# Patient Record
Sex: MALE | Race: WHITE | Employment: FULL TIME | URBAN - METROPOLITAN AREA
[De-identification: names, ages, dates, MRNs, and addresses within clinical notes are randomized per-mention and may not be internally consistent; named-entity substitution may affect disease eponyms.]

---

## 2017-01-11 ENCOUNTER — OFFICE VISIT (OUTPATIENT)
Dept: INTERNAL MEDICINE CLINIC | Facility: CLINIC | Age: 57
End: 2017-01-11

## 2017-01-11 VITALS
HEART RATE: 80 BPM | DIASTOLIC BLOOD PRESSURE: 70 MMHG | RESPIRATION RATE: 16 BRPM | WEIGHT: 207 LBS | BODY MASS INDEX: 35.34 KG/M2 | SYSTOLIC BLOOD PRESSURE: 120 MMHG | HEIGHT: 64 IN | OXYGEN SATURATION: 99 %

## 2017-01-11 DIAGNOSIS — E53.8 B12 DEFICIENCY: ICD-10-CM

## 2017-01-11 DIAGNOSIS — I10 ESSENTIAL HYPERTENSION: ICD-10-CM

## 2017-01-11 DIAGNOSIS — E78.5 HYPERLIPIDEMIA, UNSPECIFIED HYPERLIPIDEMIA TYPE: ICD-10-CM

## 2017-01-11 DIAGNOSIS — E11.39 TYPE II OR UNSPECIFIED TYPE DIABETES MELLITUS WITH OPHTHALMIC MANIFESTATIONS, NOT STATED AS UNCONTROLLED(250.50): Primary | ICD-10-CM

## 2017-01-11 PROCEDURE — 96372 THER/PROPH/DIAG INJ SC/IM: CPT | Performed by: INTERNAL MEDICINE

## 2017-01-11 PROCEDURE — 99214 OFFICE O/P EST MOD 30 MIN: CPT | Performed by: INTERNAL MEDICINE

## 2017-01-11 RX ORDER — SIMVASTATIN 40 MG
TABLET ORAL
Qty: 90 TABLET | Refills: 1 | Status: SHIPPED | OUTPATIENT
Start: 2017-01-11 | End: 2017-07-07

## 2017-01-11 RX ORDER — LISINOPRIL 40 MG/1
40 TABLET ORAL
Qty: 90 TABLET | Refills: 1 | Status: SHIPPED | OUTPATIENT
Start: 2017-01-11 | End: 2017-07-07

## 2017-01-11 RX ORDER — AMLODIPINE BESYLATE 10 MG/1
TABLET ORAL
Qty: 90 TABLET | Refills: 1 | Status: SHIPPED | OUTPATIENT
Start: 2017-01-11 | End: 2017-07-07

## 2017-01-11 RX ADMIN — CYANOCOBALAMIN 1000 MCG: 1000 INJECTION INTRAMUSCULAR; SUBCUTANEOUS at 11:00:00

## 2017-01-11 NOTE — PROGRESS NOTES
HPI:   Molly Davis is a 61year old male who presents for recheck of his diabetes. Patient’s FBS have been 123, 115 , 88 , 142  Postprandial sugars are  Last visit with ophthalmologist was UTD. Pt has been checking his feet on a regular basis.  Pt carlos Oral Tab Take 1 tablet (50 mg total) by mouth nightly. Disp: 90 tablet Rfl: 3   Diclofenac Sodium 75 MG Oral Tab EC Take 1 tablet (75 mg total) by mouth daily.  Disp: 90 tablet Rfl: 3   MetFORMIN HCl (GLUCOPHAGE) 850 MG Oral Tab TAKE 1 TABLET BY MOUTH TWICE History:   Smoking Status: Never Smoker                      Smokeless Status: Never Used                        Alcohol Use: Yes           0.0 oz/week       0 Standard drinks or equivalent per week       Comment: 1 drink every few months     Family Histor ophthalmic manifestations, not stated as uncontrolled (hcc)  (primary encounter diagnosis)- controlled  Essential hypertension- controlled  Hyperlipidemia, unspecified hyperlipidemia type- at goal  B12 deficiency- b12 shot today      Orders Placed This Enc

## 2017-01-18 RX ORDER — CYANOCOBALAMIN 1000 UG/ML
1000 INJECTION INTRAMUSCULAR; SUBCUTANEOUS ONCE
Status: COMPLETED | OUTPATIENT
Start: 2017-01-11 | End: 2017-01-11

## 2017-01-26 ENCOUNTER — OFFICE VISIT (OUTPATIENT)
Dept: NEUROLOGY | Facility: CLINIC | Age: 57
End: 2017-01-26

## 2017-01-26 ENCOUNTER — TELEPHONE (OUTPATIENT)
Dept: NEUROLOGY | Facility: CLINIC | Age: 57
End: 2017-01-26

## 2017-01-26 VITALS
WEIGHT: 208 LBS | BODY MASS INDEX: 36 KG/M2 | DIASTOLIC BLOOD PRESSURE: 84 MMHG | HEART RATE: 60 BPM | SYSTOLIC BLOOD PRESSURE: 118 MMHG | RESPIRATION RATE: 16 BRPM

## 2017-01-26 DIAGNOSIS — M25.562 ACUTE PAIN OF LEFT KNEE: ICD-10-CM

## 2017-01-26 DIAGNOSIS — M31.5 POLYMYALGIA ARTERITICA (HCC): ICD-10-CM

## 2017-01-26 DIAGNOSIS — G81.10 SPASTIC HEMIPLEGIA AFFECTING NONDOMINANT SIDE (HCC): ICD-10-CM

## 2017-01-26 DIAGNOSIS — R26.9 GAIT ABNORMALITY: ICD-10-CM

## 2017-01-26 DIAGNOSIS — I69.354 HEMIPARESIS AFFECTING LEFT SIDE AS LATE EFFECT OF STROKE (HCC): ICD-10-CM

## 2017-01-26 DIAGNOSIS — G44.209 TENSION HEADACHE: Primary | ICD-10-CM

## 2017-01-26 DIAGNOSIS — E66.9 OBESITY, UNSPECIFIED: ICD-10-CM

## 2017-01-26 DIAGNOSIS — G44.1 OTHER VASCULAR HEADACHE: ICD-10-CM

## 2017-01-26 DIAGNOSIS — M62.81 MUSCLE WEAKNESS (GENERALIZED): ICD-10-CM

## 2017-01-26 PROBLEM — M25.569 KNEE PAIN: Status: ACTIVE | Noted: 2017-01-26

## 2017-01-26 PROCEDURE — 99214 OFFICE O/P EST MOD 30 MIN: CPT | Performed by: OTHER

## 2017-01-26 RX ORDER — AMITRIPTYLINE HYDROCHLORIDE 50 MG/1
50 TABLET, FILM COATED ORAL NIGHTLY
Qty: 90 TABLET | Refills: 3 | Status: SHIPPED | OUTPATIENT
Start: 2017-01-26 | End: 2018-01-26

## 2017-01-26 RX ORDER — TOPIRAMATE 100 MG/1
400 TABLET, FILM COATED ORAL NIGHTLY
Qty: 360 TABLET | Refills: 3 | Status: SHIPPED | OUTPATIENT
Start: 2017-01-26 | End: 2018-06-26

## 2017-01-26 RX ORDER — CYANOCOBALAMIN 1000 UG/ML
1000 INJECTION INTRAMUSCULAR; SUBCUTANEOUS
COMMUNITY
End: 2017-03-14

## 2017-01-26 RX ORDER — DICLOFENAC SODIUM 75 MG/1
75 TABLET, DELAYED RELEASE ORAL DAILY
Qty: 90 TABLET | Refills: 3 | Status: SHIPPED | OUTPATIENT
Start: 2017-01-26 | End: 2018-01-26

## 2017-01-26 NOTE — PROGRESS NOTES
Pt here for follow up for stroke 7 years ago. Pt reports confusion and difficulty remembering things. Pt here to discuss next steps.

## 2017-01-26 NOTE — PATIENT INSTRUCTIONS
Refill policies:    • Allow 2 business days for refills; controlled substances may take longer.   • Contact your pharmacy at least 5 days prior to running out of medication and have them send an electronic request or submit request through the “request re your physician has recommended that you have a procedure or additional testing performed. [facility]) will contact your insurance carrier to obtain pre-certification or prior authorization.     Unfortunately, KAREN has seen an increas

## 2017-01-26 NOTE — PROGRESS NOTES
[facility] in [place] with [facility]  Neurology - Clinic Follow up  2017    Shyla Edmonds Patient Status:  No patient class for patient encounter    11/15/1960 MRN [id number]   [facility] Hyperlipidemia    • Tension headache    • Obesity, unspecified    • Cataract    • Capsulitis    • Radiculitis, cervical    • Arthropathy      Thoracic   • Myofacial muscle pain    • Sexual dysfunction    • Thalamic infarction [facility])      bilateral thalamic denies any fever or chills. But weight loss 8 pounds in a week,   L. Upper eyelid infection,   Respiratory: Denies: Difficulty Breathing, Chronic Cough and Wheezing. Cardiovascular: NO Chest Pain and Palpitations.   Neurological:  See history; relevant ite continue PT,    (434.11) Cerebral embolism with cerebral infarction  Old L.  Side weakness, same,        PMR, check ESR, he is better  Keep diclofenac daily,     (781.2) Gait abnormality  (729.5) Pain in limb, same, on cane  He has more L. Knee pain, instru

## 2017-01-26 NOTE — TELEPHONE ENCOUNTER
Form signed by Dr. Gold Cavazos and faxed to [facility] at above number with OV from 1/26/17 visit. Fax confirmation received. Original form sent to scanning.

## 2017-01-26 NOTE — TELEPHONE ENCOUNTER
While at [address], pt requested Dr. Leeroy Roblero complete the Attending physician progress notes for disability. Paperwork initiated. OV notes printed. Placed in Dr. Kali Campbell office for signature.

## 2017-02-10 ENCOUNTER — NURSE ONLY (OUTPATIENT)
Dept: INTERNAL MEDICINE CLINIC | Facility: CLINIC | Age: 57
End: 2017-02-10

## 2017-02-10 DIAGNOSIS — E53.8 VITAMIN B12 DEFICIENCY: Primary | ICD-10-CM

## 2017-02-10 PROCEDURE — 96372 THER/PROPH/DIAG INJ SC/IM: CPT | Performed by: PHYSICIAN ASSISTANT

## 2017-02-10 RX ORDER — CYANOCOBALAMIN 1000 UG/ML
1000 INJECTION INTRAMUSCULAR; SUBCUTANEOUS ONCE
Status: COMPLETED | OUTPATIENT
Start: 2017-02-10 | End: 2017-02-10

## 2017-02-10 RX ADMIN — CYANOCOBALAMIN 1000 MCG: 1000 INJECTION INTRAMUSCULAR; SUBCUTANEOUS at 13:26:00

## 2017-03-13 ENCOUNTER — NURSE ONLY (OUTPATIENT)
Dept: INTERNAL MEDICINE CLINIC | Facility: CLINIC | Age: 57
End: 2017-03-13

## 2017-03-13 ENCOUNTER — TELEPHONE (OUTPATIENT)
Dept: INTERNAL MEDICINE CLINIC | Facility: CLINIC | Age: 57
End: 2017-03-13

## 2017-03-13 DIAGNOSIS — E53.8 B12 DEFICIENCY: Primary | ICD-10-CM

## 2017-03-13 PROCEDURE — 96372 THER/PROPH/DIAG INJ SC/IM: CPT | Performed by: INTERNAL MEDICINE

## 2017-03-13 RX ADMIN — CYANOCOBALAMIN 1000 MCG: 1000 INJECTION INTRAMUSCULAR; SUBCUTANEOUS at 10:39:00

## 2017-03-14 RX ORDER — CYANOCOBALAMIN 1000 UG/ML
1000 INJECTION INTRAMUSCULAR; SUBCUTANEOUS
Status: COMPLETED | OUTPATIENT
Start: 2017-03-14 | End: 2018-02-16

## 2017-04-11 ENCOUNTER — NURSE ONLY (OUTPATIENT)
Dept: INTERNAL MEDICINE CLINIC | Facility: CLINIC | Age: 57
End: 2017-04-11

## 2017-04-11 PROCEDURE — 96372 THER/PROPH/DIAG INJ SC/IM: CPT | Performed by: INTERNAL MEDICINE

## 2017-04-11 RX ADMIN — CYANOCOBALAMIN 1000 MCG: 1000 INJECTION INTRAMUSCULAR; SUBCUTANEOUS at 11:08:00

## 2017-04-18 ENCOUNTER — TELEPHONE (OUTPATIENT)
Dept: INTERNAL MEDICINE CLINIC | Facility: CLINIC | Age: 57
End: 2017-04-18

## 2017-04-18 NOTE — TELEPHONE ENCOUNTER
Pt walked in with clearance form for dental procedure on 4/26/17. Form fee assessed. Form to Dr Malik Elias for completion and signature. Pt requesting form to be faxed to dental office and himself. He will call back with his fax #.

## 2017-05-11 ENCOUNTER — NURSE ONLY (OUTPATIENT)
Dept: INTERNAL MEDICINE CLINIC | Facility: CLINIC | Age: 57
End: 2017-05-11

## 2017-05-11 PROCEDURE — 96372 THER/PROPH/DIAG INJ SC/IM: CPT | Performed by: INTERNAL MEDICINE

## 2017-05-11 RX ADMIN — CYANOCOBALAMIN 1000 MCG: 1000 INJECTION INTRAMUSCULAR; SUBCUTANEOUS at 10:53:00

## 2017-05-26 RX ORDER — SIMVASTATIN 40 MG
TABLET ORAL
Qty: 90 TABLET | OUTPATIENT
Start: 2017-05-26

## 2017-06-12 ENCOUNTER — NURSE ONLY (OUTPATIENT)
Dept: INTERNAL MEDICINE CLINIC | Facility: CLINIC | Age: 57
End: 2017-06-12

## 2017-06-12 PROCEDURE — 96372 THER/PROPH/DIAG INJ SC/IM: CPT | Performed by: INTERNAL MEDICINE

## 2017-06-12 RX ADMIN — CYANOCOBALAMIN 1000 MCG: 1000 INJECTION INTRAMUSCULAR; SUBCUTANEOUS at 12:05:00

## 2017-07-07 RX ORDER — LISINOPRIL 40 MG/1
TABLET ORAL
Qty: 90 TABLET | Refills: 0 | Status: SHIPPED | OUTPATIENT
Start: 2017-07-07 | End: 2017-10-17

## 2017-07-07 RX ORDER — BLOOD SUGAR DIAGNOSTIC
STRIP MISCELLANEOUS
Qty: 100 STRIP | Refills: 0 | Status: SHIPPED | OUTPATIENT
Start: 2017-07-07 | End: 2017-10-17

## 2017-07-07 RX ORDER — AMLODIPINE BESYLATE 10 MG/1
TABLET ORAL
Qty: 90 TABLET | Refills: 0 | Status: SHIPPED | OUTPATIENT
Start: 2017-07-07 | End: 2017-10-17

## 2017-07-09 RX ORDER — SIMVASTATIN 40 MG
TABLET ORAL
Qty: 90 TABLET | Refills: 0 | Status: SHIPPED | OUTPATIENT
Start: 2017-07-09 | End: 2017-09-01

## 2017-07-10 NOTE — ADDENDUM NOTE
Encounter addended by: Zaida Charles LPN on: 6/89/3447 19:87 AM<BR>    Actions taken: Letter status changed

## 2017-07-12 LAB
ALBUMIN/GLOBULIN RATIO: 1.3 (CALC) (ref 1–2.5)
ALBUMIN: 4.1 G/DL (ref 3.6–5.1)
ALKALINE PHOSPHATASE: 64 U/L (ref 40–115)
ALT: 4 U/L (ref 9–46)
AST: 8 U/L (ref 10–35)
BILIRUBIN, TOTAL: 0.6 MG/DL (ref 0.2–1.2)
BUN: 14 MG/DL (ref 7–25)
CALCIUM: 9 MG/DL (ref 8.6–10.3)
CARBON DIOXIDE: 21 MMOL/L (ref 20–31)
CHLORIDE: 111 MMOL/L (ref 98–110)
CHOL/HDLC RATIO: 2.6 (CALC)
CHOLESTEROL, TOTAL: 140 MG/DL (ref 125–200)
CREATININE, RANDOM URINE: 238 MG/DL (ref 20–370)
CREATININE: 0.95 MG/DL (ref 0.7–1.33)
EGFR IF AFRICN AM: 103 ML/MIN/1.73M2
EGFR IF NONAFRICN AM: 88 ML/MIN/1.73M2
GLOBULIN: 3.1 G/DL (CALC) (ref 1.9–3.7)
GLUCOSE: 81 MG/DL (ref 65–99)
HDL CHOLESTEROL: 53 MG/DL
HEMOGLOBIN A1C: 5.4 % OF TOTAL HGB
LDL-CHOLESTEROL: 74 MG/DL (CALC)
MICROALBUMIN/CREATININE RATIO, RANDOM URINE: 11 MCG/MG CREAT
MICROALBUMIN: 2.5 MG/DL
NON-HDL CHOLESTEROL: 87 MG/DL (CALC)
POTASSIUM: 4.1 MMOL/L (ref 3.5–5.3)
PROTEIN, TOTAL: 7.2 G/DL (ref 6.1–8.1)
SODIUM: 141 MMOL/L (ref 135–146)
TRIGLYCERIDES: 65 MG/DL

## 2017-07-13 ENCOUNTER — OFFICE VISIT (OUTPATIENT)
Dept: INTERNAL MEDICINE CLINIC | Facility: CLINIC | Age: 57
End: 2017-07-13

## 2017-07-13 VITALS
BODY MASS INDEX: 33.12 KG/M2 | WEIGHT: 194 LBS | OXYGEN SATURATION: 99 % | DIASTOLIC BLOOD PRESSURE: 60 MMHG | SYSTOLIC BLOOD PRESSURE: 100 MMHG | HEART RATE: 66 BPM | HEIGHT: 64 IN | RESPIRATION RATE: 12 BRPM

## 2017-07-13 DIAGNOSIS — I69.359 HEMIPARESIS DUE TO OLD CEREBROVASCULAR ACCIDENT (HCC): ICD-10-CM

## 2017-07-13 DIAGNOSIS — I10 ESSENTIAL HYPERTENSION: ICD-10-CM

## 2017-07-13 DIAGNOSIS — E11.39 TYPE II OR UNSPECIFIED TYPE DIABETES MELLITUS WITH OPHTHALMIC MANIFESTATIONS, NOT STATED AS UNCONTROLLED(250.50): Primary | ICD-10-CM

## 2017-07-13 DIAGNOSIS — Z86.73 HX OF COMPLETED STROKE: ICD-10-CM

## 2017-07-13 DIAGNOSIS — E53.8 B12 DEFICIENCY: ICD-10-CM

## 2017-07-13 PROCEDURE — 99214 OFFICE O/P EST MOD 30 MIN: CPT | Performed by: INTERNAL MEDICINE

## 2017-07-13 PROCEDURE — 96372 THER/PROPH/DIAG INJ SC/IM: CPT | Performed by: INTERNAL MEDICINE

## 2017-07-13 RX ORDER — CHLORHEXIDINE GLUCONATE 0.12 MG/ML
RINSE ORAL
Refills: 2 | COMMUNITY
Start: 2017-05-13 | End: 2017-07-17

## 2017-07-13 RX ADMIN — CYANOCOBALAMIN 1000 MCG: 1000 INJECTION INTRAMUSCULAR; SUBCUTANEOUS at 09:23:00

## 2017-07-13 NOTE — PROGRESS NOTES
HPI:   Guy Ferrera is a 61year old male who presents for recheck of his diabetes.  Patient’s FBS have been  108 , 131, 146, 129, 111, 118, 112 , 151  Pt does not sleep well Pt has new glucometer  Postprandial sugars are not checked  Last visit with oph total) by mouth nightly. Disp: 360 tablet Rfl: 3   Amitriptyline HCl 50 MG Oral Tab Take 1 tablet (50 mg total) by mouth nightly. Disp: 90 tablet Rfl: 3   Diclofenac Sodium 75 MG Oral Tab EC Take 1 tablet (75 mg total) by mouth daily.  Disp: 90 tablet Rfl: Never Used                      Alcohol use: Yes           0.0 oz/week     Comment: 1 drink every few months     Family History   Problem Relation Age of Onset   • Colon ca [Other] [OTHER]       fam hx   • Arthritis       fam hx   • MI [Other] [OTHER] Fath CHOL/HDLC RATIO 2.6 < OR = 5.0 (calc)   NON-HDL CHOLESTEROL 87 mg/dL (calc)   -COMP METABOLIC PANEL (14)   Result Value Ref Range   GLUCOSE 81 65 - 99 mg/dL   UREA NITROGEN (BUN) 14 7 - 25 mg/dL   CREATININE 0.95 0.70 - 1.33 mg/dL   eGFR NON-AFR.  KIET

## 2017-08-11 ENCOUNTER — NURSE ONLY (OUTPATIENT)
Dept: INTERNAL MEDICINE CLINIC | Facility: CLINIC | Age: 57
End: 2017-08-11

## 2017-08-11 PROCEDURE — 96372 THER/PROPH/DIAG INJ SC/IM: CPT | Performed by: INTERNAL MEDICINE

## 2017-08-11 RX ADMIN — CYANOCOBALAMIN 1000 MCG: 1000 INJECTION INTRAMUSCULAR; SUBCUTANEOUS at 11:30:00

## 2017-08-24 ENCOUNTER — TELEPHONE (OUTPATIENT)
Dept: INTERNAL MEDICINE CLINIC | Facility: CLINIC | Age: 57
End: 2017-08-24

## 2017-08-25 RX ORDER — VARDENAFIL HCL 20 MG
TABLET ORAL
Qty: 10 TABLET | Refills: 3 | Status: SHIPPED | OUTPATIENT
Start: 2017-08-25 | End: 2017-08-30

## 2017-08-25 NOTE — TELEPHONE ENCOUNTER
Last OV pertinent to medication: 7/13/2017  Last refill date: 3/7/2016      #/refills: 10/5  When pt was asked to return for OV: 6 months - DM check  Upcoming appt/reason: None , only B12 injections

## 2017-08-29 NOTE — TELEPHONE ENCOUNTER
Levitra denied by insurance. Do you want prior auth or to try different medication? This is not a new medication for pt.

## 2017-08-30 ENCOUNTER — TELEPHONE (OUTPATIENT)
Dept: INTERNAL MEDICINE CLINIC | Facility: CLINIC | Age: 57
End: 2017-08-30

## 2017-08-30 RX ORDER — SILDENAFIL 100 MG/1
50 TABLET, FILM COATED ORAL AS NEEDED
Qty: 8 TABLET | Refills: 1 | Status: SHIPPED | OUTPATIENT
Start: 2017-08-30 | End: 2018-08-22

## 2017-08-30 RX ORDER — SILDENAFIL 50 MG/1
50 TABLET, FILM COATED ORAL
Qty: 10 TABLET | Refills: 1 | Status: SHIPPED | OUTPATIENT
Start: 2017-08-30 | End: 2017-08-30

## 2017-08-30 NOTE — TELEPHONE ENCOUNTER
Please call pt, he has question re a medication, would not tell me which one, states that he spoke with one of our nurses about this in the past. Thank you

## 2017-08-30 NOTE — TELEPHONE ENCOUNTER
Received fax  From [employer] re: new order for Viagra. Noted below order. Placed order for Viagra  50 mg to [facility].

## 2017-08-30 NOTE — TELEPHONE ENCOUNTER
Spoke with pt. Pt states that when he spoke with insurance company, he was told that they would only cover Viagra 100 mg strength, not any other strength (25 mg or 50 mg). Pt states that he would take 1/2 pill of the Viagra 100mg if ordered.   Pt states th

## 2017-08-30 NOTE — TELEPHONE ENCOUNTER
Incoming (mail or fax): Fax  Received from:  [facility]- new prescription request for Viagra   Documentation given to:  [id number] [employer] fax bin.

## 2017-09-01 RX ORDER — SIMVASTATIN 40 MG
TABLET ORAL
Qty: 90 TABLET | Refills: 1 | Status: SHIPPED | OUTPATIENT
Start: 2017-09-01 | End: 2018-03-13

## 2017-09-05 ENCOUNTER — TELEPHONE (OUTPATIENT)
Dept: INTERNAL MEDICINE CLINIC | Facility: CLINIC | Age: 57
End: 2017-09-05

## 2017-09-05 NOTE — TELEPHONE ENCOUNTER
Incoming (mail or fax): Fax  Received from:  [employer]  Documentation given to:  [id number] [employer] fax bin.

## 2017-09-06 NOTE — TELEPHONE ENCOUNTER
Received fax from [address] [place] [address] Dr davila: concerns about concurrent use of prescribed meds. Per Dr. Rollins Manifold advised no concerns at this time. Discarded.

## 2017-09-12 ENCOUNTER — NURSE ONLY (OUTPATIENT)
Dept: INTERNAL MEDICINE CLINIC | Facility: CLINIC | Age: 57
End: 2017-09-12

## 2017-09-12 PROCEDURE — 96372 THER/PROPH/DIAG INJ SC/IM: CPT | Performed by: INTERNAL MEDICINE

## 2017-09-12 RX ADMIN — CYANOCOBALAMIN 1000 MCG: 1000 INJECTION INTRAMUSCULAR; SUBCUTANEOUS at 09:59:00

## 2017-10-13 ENCOUNTER — TELEPHONE (OUTPATIENT)
Dept: INTERNAL MEDICINE CLINIC | Facility: CLINIC | Age: 57
End: 2017-10-13

## 2017-10-13 ENCOUNTER — NURSE ONLY (OUTPATIENT)
Dept: INTERNAL MEDICINE CLINIC | Facility: CLINIC | Age: 57
End: 2017-10-13

## 2017-10-13 PROCEDURE — 96372 THER/PROPH/DIAG INJ SC/IM: CPT | Performed by: INTERNAL MEDICINE

## 2017-10-13 RX ADMIN — CYANOCOBALAMIN 1000 MCG: 1000 INJECTION INTRAMUSCULAR; SUBCUTANEOUS at 11:50:00

## 2017-10-13 NOTE — TELEPHONE ENCOUNTER
Pt came in for B12 injection and wanted to inform Dr Irene Richter per dentist's request that he has had 2 dental infections in past 6 months so has had to take antibiotics for these.  Was on abx recently but did not remember name, has bottle at home if medication na

## 2017-10-17 RX ORDER — BLOOD SUGAR DIAGNOSTIC
STRIP MISCELLANEOUS
Qty: 100 STRIP | Refills: 0 | Status: SHIPPED | OUTPATIENT
Start: 2017-10-17 | End: 2018-01-16

## 2017-10-17 RX ORDER — LISINOPRIL 40 MG/1
40 TABLET ORAL
Qty: 90 TABLET | Refills: 0 | Status: SHIPPED | OUTPATIENT
Start: 2017-10-17 | End: 2018-01-16

## 2017-10-17 RX ORDER — AMLODIPINE BESYLATE 10 MG/1
10 TABLET ORAL
Qty: 90 TABLET | Refills: 0 | Status: SHIPPED | OUTPATIENT
Start: 2017-10-17 | End: 2018-01-16

## 2017-11-14 ENCOUNTER — NURSE ONLY (OUTPATIENT)
Dept: INTERNAL MEDICINE CLINIC | Facility: CLINIC | Age: 57
End: 2017-11-14

## 2017-11-14 PROCEDURE — 96372 THER/PROPH/DIAG INJ SC/IM: CPT | Performed by: INTERNAL MEDICINE

## 2017-11-14 RX ADMIN — CYANOCOBALAMIN 1000 MCG: 1000 INJECTION INTRAMUSCULAR; SUBCUTANEOUS at 09:58:00

## 2017-12-11 NOTE — TELEPHONE ENCOUNTER
Script passed protocol. Patient is asking for 3 months worth since its going to [employer].    7/13/17 Last seen for Med check   Last refill date: 7/9/17     #/refills: 180 tabs + 0 refills  When pt was asked to return for OV: follow up in 6 months   [employer]

## 2017-12-14 ENCOUNTER — NURSE ONLY (OUTPATIENT)
Dept: INTERNAL MEDICINE CLINIC | Facility: CLINIC | Age: 57
End: 2017-12-14

## 2017-12-14 PROCEDURE — 96372 THER/PROPH/DIAG INJ SC/IM: CPT | Performed by: INTERNAL MEDICINE

## 2017-12-14 RX ADMIN — CYANOCOBALAMIN 1000 MCG: 1000 INJECTION INTRAMUSCULAR; SUBCUTANEOUS at 10:30:00

## 2017-12-26 RX ORDER — SIMVASTATIN 40 MG
TABLET ORAL
Qty: 90 TABLET | OUTPATIENT
Start: 2017-12-26

## 2018-01-06 LAB — HEMOGLOBIN A1C: 5.3 % OF TOTAL HGB

## 2018-01-16 ENCOUNTER — OFFICE VISIT (OUTPATIENT)
Dept: INTERNAL MEDICINE CLINIC | Facility: CLINIC | Age: 58
End: 2018-01-16

## 2018-01-16 VITALS
OXYGEN SATURATION: 98 % | WEIGHT: 177.5 LBS | BODY MASS INDEX: 31.85 KG/M2 | HEART RATE: 76 BPM | RESPIRATION RATE: 16 BRPM | TEMPERATURE: 98 F | HEIGHT: 62.5 IN | SYSTOLIC BLOOD PRESSURE: 122 MMHG | DIASTOLIC BLOOD PRESSURE: 70 MMHG

## 2018-01-16 DIAGNOSIS — D22.9 CHANGE IN MOLE: ICD-10-CM

## 2018-01-16 DIAGNOSIS — E11.39 TYPE II OR UNSPECIFIED TYPE DIABETES MELLITUS WITH OPHTHALMIC MANIFESTATIONS, NOT STATED AS UNCONTROLLED(250.50): Primary | ICD-10-CM

## 2018-01-16 DIAGNOSIS — I10 ESSENTIAL HYPERTENSION: ICD-10-CM

## 2018-01-16 DIAGNOSIS — Z23 NEED FOR VACCINATION: ICD-10-CM

## 2018-01-16 DIAGNOSIS — E53.8 B12 DEFICIENCY: ICD-10-CM

## 2018-01-16 PROCEDURE — 96372 THER/PROPH/DIAG INJ SC/IM: CPT | Performed by: INTERNAL MEDICINE

## 2018-01-16 PROCEDURE — 90686 IIV4 VACC NO PRSV 0.5 ML IM: CPT | Performed by: INTERNAL MEDICINE

## 2018-01-16 PROCEDURE — 90471 IMMUNIZATION ADMIN: CPT | Performed by: INTERNAL MEDICINE

## 2018-01-16 PROCEDURE — 99214 OFFICE O/P EST MOD 30 MIN: CPT | Performed by: INTERNAL MEDICINE

## 2018-01-16 RX ORDER — CYANOCOBALAMIN 1000 UG/ML
1000 INJECTION INTRAMUSCULAR; SUBCUTANEOUS ONCE
Status: CANCELLED | OUTPATIENT
Start: 2018-01-16 | End: 2018-01-16

## 2018-01-16 RX ORDER — AMLODIPINE BESYLATE 10 MG/1
10 TABLET ORAL
Qty: 90 TABLET | Refills: 1 | Status: SHIPPED | OUTPATIENT
Start: 2018-01-16 | End: 2018-08-22

## 2018-01-16 RX ORDER — LISINOPRIL 40 MG/1
40 TABLET ORAL
Qty: 90 TABLET | Refills: 1 | Status: SHIPPED | OUTPATIENT
Start: 2018-01-16 | End: 2018-08-22

## 2018-01-16 RX ORDER — BLOOD SUGAR DIAGNOSTIC
STRIP MISCELLANEOUS
Qty: 100 STRIP | Refills: 1 | Status: SHIPPED | OUTPATIENT
Start: 2018-01-16 | End: 2018-10-30

## 2018-01-16 RX ADMIN — CYANOCOBALAMIN 1000 MCG: 1000 INJECTION INTRAMUSCULAR; SUBCUTANEOUS at 16:57:00

## 2018-01-16 NOTE — PROGRESS NOTES
HPI:   Elliot Tiwari is a 61year old male who presents for recheck of his diabetes. Patient’s FBS have been running  110 , 136, 137, 126 , 95 fasting AM  No hypoglycemia reactions . Pt has been checking his feet on a regular basis.  Pt stable tingling mg total) by mouth as needed for Erectile Dysfunction. Disp: 8 tablet Rfl: 1   topiramate (TOPIRAGEN) 100 MG Oral Tab Take 4 tablets (400 mg total) by mouth nightly.  Disp: 360 tablet Rfl: 3   Amitriptyline HCl 50 MG Oral Tab Take 1 tablet (50 mg total) by Smokeless tobacco: Never Used                      Alcohol use: Yes           0.0 oz/week     Comment: 1 drink every few months     Family History   Problem Relation Age of Onset   • Colon ca [Other] [OTHER]       fam hx   • Arthritis       fam hx   • unspecified type diabetes mellitus with ophthalmic manifestations, not stated as uncontrolled(250.50) (hcc)  (primary encounter diagnosis)- need eye exam  Stay on metformin bid 850  Check labs in 6 months  Change in mole-refer to Dr Benji Leal

## 2018-02-16 ENCOUNTER — NURSE ONLY (OUTPATIENT)
Dept: INTERNAL MEDICINE CLINIC | Facility: CLINIC | Age: 58
End: 2018-02-16

## 2018-02-16 PROCEDURE — 96372 THER/PROPH/DIAG INJ SC/IM: CPT | Performed by: INTERNAL MEDICINE

## 2018-02-16 RX ADMIN — CYANOCOBALAMIN 1000 MCG: 1000 INJECTION INTRAMUSCULAR; SUBCUTANEOUS at 13:03:00

## 2018-02-16 NOTE — PROGRESS NOTES
Pt here for B12 shot. Pt name and  verified. Admin IM left deltoid, no complications, no complaints. Will need new order placed. Request routed to Dr Asif Brown for new order.

## 2018-03-14 RX ORDER — SIMVASTATIN 40 MG
TABLET ORAL
Qty: 90 TABLET | Refills: 0 | Status: SHIPPED | OUTPATIENT
Start: 2018-03-14 | End: 2018-05-17

## 2018-03-15 ENCOUNTER — NURSE ONLY (OUTPATIENT)
Dept: INTERNAL MEDICINE CLINIC | Facility: CLINIC | Age: 58
End: 2018-03-15

## 2018-03-15 PROCEDURE — 96372 THER/PROPH/DIAG INJ SC/IM: CPT | Performed by: INTERNAL MEDICINE

## 2018-03-15 RX ADMIN — CYANOCOBALAMIN 1000 MCG: 1000 INJECTION INTRAMUSCULAR; SUBCUTANEOUS at 10:23:00

## 2018-03-15 NOTE — PROGRESS NOTES
Patient here for his Vitamin B12 injection. Verified name,  and PCP name. Administered 1mL / 1000 mcg IM in L deltoid. Patient tolerated well.

## 2018-04-17 ENCOUNTER — NURSE ONLY (OUTPATIENT)
Dept: INTERNAL MEDICINE CLINIC | Facility: CLINIC | Age: 58
End: 2018-04-17

## 2018-04-17 PROCEDURE — 96372 THER/PROPH/DIAG INJ SC/IM: CPT | Performed by: INTERNAL MEDICINE

## 2018-04-17 RX ADMIN — CYANOCOBALAMIN 1000 MCG: 1000 INJECTION INTRAMUSCULAR; SUBCUTANEOUS at 11:57:00

## 2018-04-17 NOTE — PROGRESS NOTES
Patient came in for his monthly Vitamin b12 injection. Verified name, , PCP name, and procedure to be done. Administered 1000 mcg/1 mL IM in left deltoid. Patient tolerated well.

## 2018-05-18 ENCOUNTER — NURSE ONLY (OUTPATIENT)
Dept: INTERNAL MEDICINE CLINIC | Facility: CLINIC | Age: 58
End: 2018-05-18

## 2018-05-18 PROCEDURE — 96372 THER/PROPH/DIAG INJ SC/IM: CPT | Performed by: INTERNAL MEDICINE

## 2018-05-18 RX ORDER — SIMVASTATIN 40 MG
TABLET ORAL
Qty: 90 TABLET | Refills: 0 | Status: SHIPPED | OUTPATIENT
Start: 2018-05-18 | End: 2018-08-22

## 2018-05-18 RX ADMIN — CYANOCOBALAMIN 1000 MCG: 1000 INJECTION INTRAMUSCULAR; SUBCUTANEOUS at 11:02:00

## 2018-05-18 NOTE — PROGRESS NOTES
Patient came in for his monthly Vitamin B12 injection. Verified name, , PCP name, and procedure to be performed. Administered 1mL/1000 mcg Vitamin B12 in Left Deltoid. Patient tolerated well.

## 2018-06-18 ENCOUNTER — NURSE ONLY (OUTPATIENT)
Dept: INTERNAL MEDICINE CLINIC | Facility: CLINIC | Age: 58
End: 2018-06-18

## 2018-06-18 PROCEDURE — 96372 THER/PROPH/DIAG INJ SC/IM: CPT | Performed by: INTERNAL MEDICINE

## 2018-06-18 RX ADMIN — CYANOCOBALAMIN 1000 MCG: 1000 INJECTION INTRAMUSCULAR; SUBCUTANEOUS at 11:50:00

## 2018-06-18 NOTE — PROGRESS NOTES
Pt here for b12 monthly injection. Name and  verified. Admin left deltoid IM no complications/complaints. To return 1 month.

## 2018-06-26 ENCOUNTER — OFFICE VISIT (OUTPATIENT)
Dept: NEUROLOGY | Facility: CLINIC | Age: 58
End: 2018-06-26

## 2018-06-26 VITALS
WEIGHT: 201 LBS | BODY MASS INDEX: 35 KG/M2 | SYSTOLIC BLOOD PRESSURE: 120 MMHG | HEART RATE: 64 BPM | DIASTOLIC BLOOD PRESSURE: 70 MMHG

## 2018-06-26 DIAGNOSIS — I69.354 HEMIPARESIS AFFECTING LEFT SIDE AS LATE EFFECT OF STROKE (HCC): ICD-10-CM

## 2018-06-26 DIAGNOSIS — M62.81 MUSCLE WEAKNESS (GENERALIZED): ICD-10-CM

## 2018-06-26 DIAGNOSIS — G81.10 SPASTIC HEMIPLEGIA AFFECTING NONDOMINANT SIDE (HCC): ICD-10-CM

## 2018-06-26 DIAGNOSIS — R26.9 GAIT ABNORMALITY: Primary | ICD-10-CM

## 2018-06-26 DIAGNOSIS — G44.049 CHRONIC PAROXYSMAL HEMICRANIA, NOT INTRACTABLE: ICD-10-CM

## 2018-06-26 PROCEDURE — 99215 OFFICE O/P EST HI 40 MIN: CPT | Performed by: OTHER

## 2018-06-26 RX ORDER — TOPIRAMATE 100 MG/1
400 TABLET, FILM COATED ORAL NIGHTLY
Qty: 360 TABLET | Refills: 3 | Status: SHIPPED | OUTPATIENT
Start: 2018-06-26 | End: 2018-11-15

## 2018-06-26 NOTE — PATIENT INSTRUCTIONS
Refill policies:    • Allow 2-3 business days for refills; controlled substances may take longer.   • Contact your pharmacy at least 5 days prior to running out of medication and have them send an electronic request or submit request through the “request re entire amount billed. Precertification and Prior Authorizations: If your physician has recommended that you have a procedure or additional testing performed.   [facility]) will contact your insurance carrier to obtain pre-certi

## 2018-06-26 NOTE — PROGRESS NOTES
[facility] in [place] with [facility]  Neurology - Clinic Follow up  2018    Michelet Gonzalez Patient Status:  No patient class for patient encounter    11/15/1960 MRN [id number]   CARLIE pain/belching Since procedure, has lessened slightly.    • Frequent urination March 2010   • Headache March 2010   • Headache(784.0)    • Hyperlipidemia    • Hypertension    • Leg swelling March 2010   • Loss of appetite March 2010   • Muscle weakness (gene tablet Rfl: 1   Glucose Blood (ONETOUCH VERIO) In Vitro Strip Check blood sugar once a  day Disp: 100 strip Rfl: 1   Sildenafil Citrate (VIAGRA) 100 MG Oral Tab Take 0.5 tablets (50 mg total) by mouth as needed for Erectile Dysfunction.  Disp: 8 tablet Rfl: abnormality - Primary    HA (headache)            Impression/Plan:  (898.87) Muscle weakness (generalized)  (primary encounter diagnosis)  Same,     (434.11) Cerebral embolism with cerebral infarction  Old L.  Side weakness, same,        PMR, check ESR, he

## 2018-06-26 NOTE — PROGRESS NOTES
Patient here for stroke follow up. He states he still continues to have headaches, balance is bad and has frequent falls. Patient states he has trouble with short term memory.

## 2018-07-17 ENCOUNTER — NURSE ONLY (OUTPATIENT)
Dept: INTERNAL MEDICINE CLINIC | Facility: CLINIC | Age: 58
End: 2018-07-17
Payer: COMMERCIAL

## 2018-07-17 PROCEDURE — 96372 THER/PROPH/DIAG INJ SC/IM: CPT | Performed by: INTERNAL MEDICINE

## 2018-07-17 RX ADMIN — CYANOCOBALAMIN 1000 MCG: 1000 INJECTION INTRAMUSCULAR; SUBCUTANEOUS at 14:46:00

## 2018-07-17 NOTE — PROGRESS NOTES
Pt here for monthly B12 injection. Admin IM left deltoid, no complications. Pt will need next monthly dose here. Then will be transferring providers as his PCP is leaving. New PCP contacted advising of pts concern of needing to continue B12 injections.

## 2018-07-18 ENCOUNTER — TELEPHONE (OUTPATIENT)
Dept: INTERNAL MEDICINE CLINIC | Facility: CLINIC | Age: 58
End: 2018-07-18

## 2018-07-18 NOTE — TELEPHONE ENCOUNTER
Incoming (mail or fax): Handicap Placard   Received from:  Patient dropped off at office  Documentation given to: Triage     Patient did not pay for form aware of possible fee of $25. Please call patient when form is completed. Thank you!

## 2018-07-18 NOTE — TELEPHONE ENCOUNTER
Patient dropped off form for a Parking Placard. Do not see in chart that we've done one in the past for him. The patient is aware of the $25. charge. Form to be filled out and signed in folder. Routed to Dr. Tana Marcial.

## 2018-07-19 NOTE — TELEPHONE ENCOUNTER
Signed and filled out form at  for the patient to . Attempted to call the patient, but there was no answer other than an unidentified VM. Will have to try back. Copy of form in scanning bin.

## 2018-08-22 ENCOUNTER — OFFICE VISIT (OUTPATIENT)
Dept: FAMILY MEDICINE CLINIC | Facility: CLINIC | Age: 58
End: 2018-08-22
Payer: COMMERCIAL

## 2018-08-22 ENCOUNTER — TELEPHONE (OUTPATIENT)
Dept: FAMILY MEDICINE CLINIC | Facility: CLINIC | Age: 58
End: 2018-08-22

## 2018-08-22 VITALS
WEIGHT: 205 LBS | SYSTOLIC BLOOD PRESSURE: 100 MMHG | RESPIRATION RATE: 16 BRPM | BODY MASS INDEX: 35 KG/M2 | OXYGEN SATURATION: 97 % | DIASTOLIC BLOOD PRESSURE: 60 MMHG | HEART RATE: 68 BPM | HEIGHT: 64 IN

## 2018-08-22 DIAGNOSIS — E11.42 DM TYPE 2 WITH DIABETIC PERIPHERAL NEUROPATHY (HCC): Primary | ICD-10-CM

## 2018-08-22 DIAGNOSIS — I10 ESSENTIAL HYPERTENSION: ICD-10-CM

## 2018-08-22 DIAGNOSIS — I69.354 HEMIPARESIS AFFECTING LEFT SIDE AS LATE EFFECT OF STROKE (HCC): ICD-10-CM

## 2018-08-22 DIAGNOSIS — E53.8 B12 DEFICIENCY: ICD-10-CM

## 2018-08-22 DIAGNOSIS — Z12.5 SCREENING PSA (PROSTATE SPECIFIC ANTIGEN): ICD-10-CM

## 2018-08-22 DIAGNOSIS — E11.42 DM TYPE 2 WITH DIABETIC PERIPHERAL NEUROPATHY (HCC): ICD-10-CM

## 2018-08-22 PROCEDURE — 99204 OFFICE O/P NEW MOD 45 MIN: CPT | Performed by: FAMILY MEDICINE

## 2018-08-22 PROCEDURE — 96372 THER/PROPH/DIAG INJ SC/IM: CPT | Performed by: FAMILY MEDICINE

## 2018-08-22 RX ORDER — SILDENAFIL 100 MG/1
50 TABLET, FILM COATED ORAL AS NEEDED
Qty: 8 TABLET | Refills: 1 | Status: SHIPPED | OUTPATIENT
Start: 2018-08-22 | End: 2018-08-28

## 2018-08-22 RX ORDER — SIMVASTATIN 40 MG
TABLET ORAL
Qty: 90 TABLET | Refills: 1 | Status: CANCELLED | OUTPATIENT
Start: 2018-08-22

## 2018-08-22 RX ORDER — CHLORHEXIDINE GLUCONATE 0.12 MG/ML
RINSE ORAL AS NEEDED
COMMUNITY
Start: 2018-07-03

## 2018-08-22 RX ORDER — SIMVASTATIN 40 MG
TABLET ORAL
Qty: 90 TABLET | Refills: 1 | Status: SHIPPED | OUTPATIENT
Start: 2018-08-22 | End: 2018-08-28

## 2018-08-22 RX ORDER — LISINOPRIL 40 MG/1
40 TABLET ORAL
Qty: 90 TABLET | Refills: 1 | Status: CANCELLED | OUTPATIENT
Start: 2018-08-22

## 2018-08-22 RX ORDER — LISINOPRIL 40 MG/1
40 TABLET ORAL
Qty: 90 TABLET | Refills: 1 | Status: SHIPPED | OUTPATIENT
Start: 2018-08-22 | End: 2018-08-28

## 2018-08-22 RX ORDER — AMLODIPINE BESYLATE 10 MG/1
10 TABLET ORAL
Qty: 90 TABLET | Refills: 1 | Status: CANCELLED | OUTPATIENT
Start: 2018-08-22

## 2018-08-22 RX ORDER — AMLODIPINE BESYLATE 10 MG/1
10 TABLET ORAL
Qty: 90 TABLET | Refills: 1 | Status: SHIPPED | OUTPATIENT
Start: 2018-08-22 | End: 2018-08-28

## 2018-08-22 RX ADMIN — CYANOCOBALAMIN 1000 MCG: 1000 INJECTION INTRAMUSCULAR; SUBCUTANEOUS at 11:08:00

## 2018-08-22 NOTE — TELEPHONE ENCOUNTER
Pt was in today with Dr Migel Gonzalez. She did not order his Viagra they had talked about. He would like for her to order it, and send it to [facility].

## 2018-08-22 NOTE — PROGRESS NOTES
HPI:   Shyla Muhammad is a 61year old male who presents for recheck of his diabetes. Patient’s FBS have been . Rhapso has been checking his feet on a regular basis. Nestor Lula denies any tingling of the feet.    Pt complains of poor balance; pt u 8 tablet Rfl: 1      Past Medical History:   Diagnosis Date   • Acute, but ill-defined, cerebrovascular disease March 2010   • Arthropathy     Thoracic   • Atypical mole January 2007   • B12 deficiency    • Back pain June 2005   • Backache, unspecified Comment: URETHRAL DILATION  4/1/2010: SHOULDER SURG PROC UNLISTED      Comment: Left subdeltoid steroid injection, Left                shoulder bursitis   Social History: Smoking status: Never Smoker peripheral neuropathy (HCC)  Reduce metformin   - simvastatin 40 MG Oral Tab; TAKE 1 TABLET BY MOUTH AT  BEDTIME  Dispense: 90 tablet; Refill: 1  - MetFORMIN HCl 500 MG Oral Tab; Take 1 tablet (500 mg total) by mouth 2 (two) times daily with meals.   Vandana Pennington

## 2018-08-26 DIAGNOSIS — E11.42 DM TYPE 2 WITH DIABETIC PERIPHERAL NEUROPATHY (HCC): ICD-10-CM

## 2018-08-26 DIAGNOSIS — I69.354 HEMIPARESIS AFFECTING LEFT SIDE AS LATE EFFECT OF STROKE (HCC): ICD-10-CM

## 2018-08-28 ENCOUNTER — TELEPHONE (OUTPATIENT)
Dept: FAMILY MEDICINE CLINIC | Facility: CLINIC | Age: 58
End: 2018-08-28

## 2018-08-28 DIAGNOSIS — E11.42 DM TYPE 2 WITH DIABETIC PERIPHERAL NEUROPATHY (HCC): ICD-10-CM

## 2018-08-28 DIAGNOSIS — I10 ESSENTIAL HYPERTENSION: ICD-10-CM

## 2018-08-28 DIAGNOSIS — I69.354 HEMIPARESIS AFFECTING LEFT SIDE AS LATE EFFECT OF STROKE (HCC): ICD-10-CM

## 2018-08-28 RX ORDER — SIMVASTATIN 40 MG
TABLET ORAL
Qty: 90 TABLET | Refills: 1 | Status: SHIPPED | OUTPATIENT
Start: 2018-08-28 | End: 2018-11-27

## 2018-08-28 RX ORDER — LISINOPRIL 40 MG/1
40 TABLET ORAL
Qty: 90 TABLET | Refills: 1 | Status: SHIPPED | OUTPATIENT
Start: 2018-08-28 | End: 2018-11-27

## 2018-08-28 RX ORDER — SIMVASTATIN 40 MG
TABLET ORAL
Qty: 90 TABLET | OUTPATIENT
Start: 2018-08-28

## 2018-08-28 RX ORDER — SILDENAFIL 100 MG/1
50 TABLET, FILM COATED ORAL AS NEEDED
Qty: 8 TABLET | Refills: 1 | Status: SHIPPED | OUTPATIENT
Start: 2018-08-28 | End: 2018-10-22

## 2018-08-28 RX ORDER — AMLODIPINE BESYLATE 10 MG/1
10 TABLET ORAL
Qty: 90 TABLET | Refills: 1 | Status: SHIPPED | OUTPATIENT
Start: 2018-08-28 | End: 2018-11-27

## 2018-09-24 ENCOUNTER — NURSE ONLY (OUTPATIENT)
Dept: FAMILY MEDICINE CLINIC | Facility: CLINIC | Age: 58
End: 2018-09-24
Payer: COMMERCIAL

## 2018-09-24 DIAGNOSIS — Z23 NEED FOR VACCINATION: ICD-10-CM

## 2018-09-24 PROCEDURE — 96372 THER/PROPH/DIAG INJ SC/IM: CPT | Performed by: FAMILY MEDICINE

## 2018-09-24 RX ORDER — CYANOCOBALAMIN 1000 UG/ML
1000 INJECTION INTRAMUSCULAR; SUBCUTANEOUS ONCE
Status: COMPLETED | OUTPATIENT
Start: 2018-09-24 | End: 2018-09-24

## 2018-09-24 RX ADMIN — CYANOCOBALAMIN 1000 MCG: 1000 INJECTION INTRAMUSCULAR; SUBCUTANEOUS at 15:53:00

## 2018-10-22 DIAGNOSIS — E11.42 DM TYPE 2 WITH DIABETIC PERIPHERAL NEUROPATHY (HCC): ICD-10-CM

## 2018-10-22 RX ORDER — SILDENAFIL 100 MG/1
TABLET, FILM COATED ORAL
Qty: 16 TABLET | Refills: 1 | Status: SHIPPED | OUTPATIENT
Start: 2018-10-22 | End: 2018-11-27

## 2018-10-23 ENCOUNTER — HOSPITAL ENCOUNTER (OUTPATIENT)
Age: 58
Discharge: HOME | End: 2018-10-23
Payer: COMMERCIAL

## 2018-10-23 ENCOUNTER — APPOINTMENT (OUTPATIENT)
Dept: GENERAL RADIOLOGY | Age: 58
End: 2018-10-23
Attending: PHYSICIAN ASSISTANT
Payer: COMMERCIAL

## 2018-10-23 VITALS
DIASTOLIC BLOOD PRESSURE: 71 MMHG | OXYGEN SATURATION: 97 % | SYSTOLIC BLOOD PRESSURE: 124 MMHG | RESPIRATION RATE: 21 BRPM | TEMPERATURE: 98 F | HEART RATE: 80 BPM

## 2018-10-23 DIAGNOSIS — S20.212A CONTUSION OF RIB ON LEFT SIDE, INITIAL ENCOUNTER: Primary | ICD-10-CM

## 2018-10-23 DIAGNOSIS — J18.9 COMMUNITY ACQUIRED PNEUMONIA OF LEFT LOWER LOBE OF LUNG: ICD-10-CM

## 2018-10-23 PROCEDURE — 99213 OFFICE O/P EST LOW 20 MIN: CPT

## 2018-10-23 PROCEDURE — 99204 OFFICE O/P NEW MOD 45 MIN: CPT

## 2018-10-23 PROCEDURE — 71101 X-RAY EXAM UNILAT RIBS/CHEST: CPT | Performed by: PHYSICIAN ASSISTANT

## 2018-10-23 RX ORDER — AZITHROMYCIN 250 MG/1
TABLET, FILM COATED ORAL
Qty: 1 PACKAGE | Refills: 0 | Status: SHIPPED | OUTPATIENT
Start: 2018-10-23 | End: 2018-11-27 | Stop reason: ALTCHOICE

## 2018-10-23 RX ORDER — ACETAMINOPHEN AND CODEINE PHOSPHATE 300; 30 MG/1; MG/1
1-2 TABLET ORAL EVERY 4 HOURS PRN
Qty: 10 TABLET | Refills: 0 | Status: SHIPPED | OUTPATIENT
Start: 2018-10-23 | End: 2018-10-25

## 2018-10-23 NOTE — ED PROVIDER NOTES
Patient Seen in: [facility]    History   Patient presents with:  Fall    Stated Complaint: rib and back pain s/p fall     HPI    CHIEF COMPLAINT: Left-sided rib pain     HISTORY OF PRESENT ILLNESS: Patient is a 40-year-old male infarction ([facility]    • Corns and callosities    • CVA (cerebral vascular accident) ([facility]    • Depression    • Depressive disorder, not elsewhere classified    • Diabetes ([facility]    • Diabetes mellitus ([facility]    • Diplopia    • Essential hypertension    • Fatig month    Drug use: No      Review of Systems    Positive for stated complaint: rib and back pain s/p fall   Other systems are as noted in HPI. Constitutional and vital signs reviewed. All other systems reviewed and negative except as noted above. TECHNIQUE:  PA Chest and three views of the ribs were obtained  COMPARISON:  [facility] , CHEST AP PORT, 7/15/2010, 14:19. [facility] , CHEST AP PORT, 2/07/2010, 12:19.   INDICATIONS:  rib and back pain s/p fall  PATIENT STATED HISTORY: (As transcribed by Werner Goldstein left side, initial encounter  (primary encounter diagnosis)  Community acquired pneumonia of left lower lobe of lung ([address]    Disposition:  Discharge  10/23/2018  4:28 pm    Follow-up:  Kenton Wells DO  [address] [place] [address] [facility]-

## 2018-10-24 NOTE — PROGRESS NOTES
HPI:   Kervin Robles is a 61year old male with history of HTN, hyperlipidemia, CVA, spastic hemiplegia, type 2 DM, obesity, polymyalgia arteritica and left hemiparesis who presents for follow up IC; diagnosed with pneumonia.  Seen on 10/23/18 s/p a fall (40 mg total) by mouth once daily. Disp: 90 tablet Rfl: 1   Chlorhexidine Gluconate 0.12 % Mouth/Throat Solution  Disp:  Rfl:    BUTALBITAL-APAP-CAFFEINE OR Take by mouth.  Disp:  Rfl:    topiramate (TOPIRAGEN) 100 MG Oral Tab Take 4 tablets (400 mg total) infarction   • 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, not stated as uncontrolled(250.50)    • Uncomfortable fullness after meals March 2010   • Unspecified cerebral artery occlusion with cerebral infarction    • Vitre any changes or worsening. Follow up if symptoms persist or worsen. Shereen Hankinsmagy was given an opportunity to ask questions and verbalized understanding of care.

## 2018-10-25 ENCOUNTER — OFFICE VISIT (OUTPATIENT)
Dept: FAMILY MEDICINE CLINIC | Facility: CLINIC | Age: 58
End: 2018-10-25
Payer: COMMERCIAL

## 2018-10-25 VITALS
OXYGEN SATURATION: 97 % | BODY MASS INDEX: 33.29 KG/M2 | TEMPERATURE: 99 F | RESPIRATION RATE: 16 BRPM | HEART RATE: 70 BPM | HEIGHT: 64 IN | SYSTOLIC BLOOD PRESSURE: 118 MMHG | DIASTOLIC BLOOD PRESSURE: 72 MMHG | WEIGHT: 195 LBS

## 2018-10-25 DIAGNOSIS — R07.81 RIB PAIN ON LEFT SIDE: Primary | ICD-10-CM

## 2018-10-25 DIAGNOSIS — R93.89 ABNORMAL CHEST X-RAY: ICD-10-CM

## 2018-10-25 PROCEDURE — 99213 OFFICE O/P EST LOW 20 MIN: CPT | Performed by: PHYSICIAN ASSISTANT

## 2018-10-25 RX ORDER — ACETAMINOPHEN AND CODEINE PHOSPHATE 300; 30 MG/1; MG/1
1-2 TABLET ORAL EVERY 6 HOURS PRN
Qty: 30 TABLET | Refills: 0 | Status: SHIPPED | OUTPATIENT
Start: 2018-10-25

## 2018-10-25 RX ORDER — SODIUM FLUORIDE 6.1 MG/ML
GEL, DENTIFRICE DENTAL
Refills: 3 | COMMUNITY
Start: 2018-07-11

## 2018-10-29 ENCOUNTER — NURSE ONLY (OUTPATIENT)
Dept: FAMILY MEDICINE CLINIC | Facility: CLINIC | Age: 58
End: 2018-10-29
Payer: COMMERCIAL

## 2018-10-29 PROCEDURE — 96372 THER/PROPH/DIAG INJ SC/IM: CPT | Performed by: FAMILY MEDICINE

## 2018-10-29 RX ORDER — CYANOCOBALAMIN 1000 UG/ML
1000 INJECTION INTRAMUSCULAR; SUBCUTANEOUS ONCE
Status: COMPLETED | OUTPATIENT
Start: 2018-10-29 | End: 2018-10-29

## 2018-10-29 RX ADMIN — CYANOCOBALAMIN 1000 MCG: 1000 INJECTION INTRAMUSCULAR; SUBCUTANEOUS at 13:20:00

## 2018-10-30 ENCOUNTER — TELEPHONE (OUTPATIENT)
Dept: FAMILY MEDICINE CLINIC | Facility: CLINIC | Age: 58
End: 2018-10-30

## 2018-10-30 RX ORDER — BLOOD SUGAR DIAGNOSTIC
STRIP MISCELLANEOUS
Qty: 100 STRIP | Refills: 1 | Status: SHIPPED | OUTPATIENT
Start: 2018-10-30 | End: 2019-04-23

## 2018-10-30 NOTE — TELEPHONE ENCOUNTER
TS1 Touch Verio Strips    Qty? Reference # [id number]    Please call [phone]    Pharmacy stated that they have faxed us 3 times and we have not responded. This is their last attempt for the patient.

## 2018-11-15 DIAGNOSIS — G44.209 TENSION HEADACHE: Primary | ICD-10-CM

## 2018-11-15 RX ORDER — TOPIRAMATE 100 MG/1
400 TABLET, FILM COATED ORAL NIGHTLY
Qty: 360 TABLET | Refills: 1 | Status: SHIPPED | OUTPATIENT
Start: 2018-11-15 | End: 2019-04-06

## 2018-11-15 NOTE — TELEPHONE ENCOUNTER
Medication: Topiramate 100mg tablets    Date of last refill: 6.26.18 (#360/3)  [place]    Last office visit: 6/26/2018  Due back to clinic per last office note:  1 year (around 6.26.19)  Date next office visit schedu

## 2018-11-27 ENCOUNTER — OFFICE VISIT (OUTPATIENT)
Dept: FAMILY MEDICINE CLINIC | Facility: CLINIC | Age: 58
End: 2018-11-27
Payer: COMMERCIAL

## 2018-11-27 VITALS
DIASTOLIC BLOOD PRESSURE: 62 MMHG | BODY MASS INDEX: 33.97 KG/M2 | HEART RATE: 84 BPM | HEIGHT: 64 IN | TEMPERATURE: 98 F | SYSTOLIC BLOOD PRESSURE: 106 MMHG | WEIGHT: 199 LBS | OXYGEN SATURATION: 98 % | RESPIRATION RATE: 18 BRPM

## 2018-11-27 DIAGNOSIS — I10 ESSENTIAL HYPERTENSION: ICD-10-CM

## 2018-11-27 DIAGNOSIS — I69.354 HEMIPARESIS AFFECTING LEFT SIDE AS LATE EFFECT OF STROKE (HCC): ICD-10-CM

## 2018-11-27 DIAGNOSIS — E11.42 DM TYPE 2 WITH DIABETIC PERIPHERAL NEUROPATHY (HCC): Primary | ICD-10-CM

## 2018-11-27 DIAGNOSIS — Z23 NEED FOR VACCINATION: ICD-10-CM

## 2018-11-27 PROCEDURE — 99214 OFFICE O/P EST MOD 30 MIN: CPT | Performed by: FAMILY MEDICINE

## 2018-11-27 PROCEDURE — 90472 IMMUNIZATION ADMIN EACH ADD: CPT | Performed by: FAMILY MEDICINE

## 2018-11-27 PROCEDURE — 90732 PPSV23 VACC 2 YRS+ SUBQ/IM: CPT | Performed by: FAMILY MEDICINE

## 2018-11-27 PROCEDURE — 90686 IIV4 VACC NO PRSV 0.5 ML IM: CPT | Performed by: FAMILY MEDICINE

## 2018-11-27 PROCEDURE — 90471 IMMUNIZATION ADMIN: CPT | Performed by: FAMILY MEDICINE

## 2018-11-27 RX ORDER — AMLODIPINE BESYLATE 10 MG/1
10 TABLET ORAL
Qty: 90 TABLET | Refills: 1 | Status: SHIPPED | OUTPATIENT
Start: 2018-11-27 | End: 2018-11-27

## 2018-11-27 RX ORDER — SIMVASTATIN 40 MG
TABLET ORAL
Qty: 90 TABLET | Refills: 1 | Status: SHIPPED | OUTPATIENT
Start: 2018-11-27 | End: 2018-11-27

## 2018-11-27 RX ORDER — SIMVASTATIN 40 MG
TABLET ORAL
Qty: 90 TABLET | Refills: 1 | Status: SHIPPED | OUTPATIENT
Start: 2018-11-27 | End: 2019-07-18

## 2018-11-27 RX ORDER — LISINOPRIL 40 MG/1
40 TABLET ORAL
Qty: 90 TABLET | Refills: 1 | Status: SHIPPED | OUTPATIENT
Start: 2018-11-27 | End: 2019-02-11

## 2018-11-27 RX ORDER — SILDENAFIL 100 MG/1
TABLET, FILM COATED ORAL
Qty: 16 TABLET | Refills: 0 | Status: SHIPPED | OUTPATIENT
Start: 2018-11-27 | End: 2019-05-13

## 2018-11-27 RX ORDER — AMLODIPINE BESYLATE 10 MG/1
10 TABLET ORAL
Qty: 90 TABLET | Refills: 1 | Status: SHIPPED | OUTPATIENT
Start: 2018-11-27 | End: 2019-02-11

## 2018-11-27 RX ORDER — LISINOPRIL 40 MG/1
40 TABLET ORAL
Qty: 90 TABLET | Refills: 1 | Status: SHIPPED | OUTPATIENT
Start: 2018-11-27 | End: 2018-11-27

## 2018-11-27 NOTE — PROGRESS NOTES
HPI:   Joe Abdalla is a 60year old male who presents for recheck of his diabetes. Patient’s FBS have been 110-140's   HgA1C is still at goal despite reducing the metformin     Charlotte Cao has been checking his feet on a regular basis.    Charlotte Cao denies any once daily. Disp: 90 tablet Rfl: 1   lisinopril 40 MG Oral Tab Take 1 tablet (40 mg total) by mouth once daily. Disp: 90 tablet Rfl: 1   Chlorhexidine Gluconate 0.12 % Mouth/Throat Solution  Disp:  Rfl:    BUTALBITAL-APAP-CAFFEINE OR Take by mouth.  Disp: Uncomfortable fullness after meals March 2010   • Unspecified cerebral artery occlusion with cerebral infarction    • Vitreous hemorrhage [facility])    • Wears glasses January 1998   • Weight loss March 2010      Past Surgical History:   Procedure Laterality Da intact      ASSESSMENT AND PLAN:   Loli Ramos is a 60year old male who presents for a recheck of his diabetes. Discussed importance of medication, diet adherence, skin care and routine exercise. Reviewed good diabetic foot care.  Annual dilated eye ex

## 2018-11-28 ENCOUNTER — HOSPITAL ENCOUNTER (OUTPATIENT)
Dept: GENERAL RADIOLOGY | Age: 58
Discharge: HOME | End: 2018-11-28
Attending: PHYSICIAN ASSISTANT
Payer: COMMERCIAL

## 2018-11-28 DIAGNOSIS — R93.89 ABNORMAL CHEST X-RAY: ICD-10-CM

## 2018-11-28 PROCEDURE — 71046 X-RAY EXAM CHEST 2 VIEWS: CPT | Performed by: PHYSICIAN ASSISTANT

## 2018-12-31 ENCOUNTER — NURSE ONLY (OUTPATIENT)
Dept: FAMILY MEDICINE CLINIC | Facility: CLINIC | Age: 58
End: 2018-12-31
Payer: COMMERCIAL

## 2018-12-31 PROCEDURE — 96372 THER/PROPH/DIAG INJ SC/IM: CPT | Performed by: FAMILY MEDICINE

## 2018-12-31 RX ADMIN — CYANOCOBALAMIN 1000 MCG: 1000 INJECTION INTRAMUSCULAR; SUBCUTANEOUS at 11:25:00

## 2019-01-29 ENCOUNTER — NURSE ONLY (OUTPATIENT)
Dept: FAMILY MEDICINE CLINIC | Facility: CLINIC | Age: 59
End: 2019-01-29
Payer: COMMERCIAL

## 2019-01-29 PROCEDURE — 96372 THER/PROPH/DIAG INJ SC/IM: CPT | Performed by: INTERNAL MEDICINE

## 2019-01-29 RX ADMIN — CYANOCOBALAMIN 1000 MCG: 1000 INJECTION INTRAMUSCULAR; SUBCUTANEOUS at 09:10:00

## 2019-02-11 DIAGNOSIS — I10 ESSENTIAL HYPERTENSION: ICD-10-CM

## 2019-02-12 RX ORDER — LISINOPRIL 40 MG/1
TABLET ORAL
Qty: 90 TABLET | Refills: 1 | Status: SHIPPED | OUTPATIENT
Start: 2019-02-12 | End: 2019-08-30

## 2019-02-12 RX ORDER — AMLODIPINE BESYLATE 10 MG/1
TABLET ORAL
Qty: 90 TABLET | Refills: 1 | Status: SHIPPED | OUTPATIENT
Start: 2019-02-12 | End: 2019-08-30

## 2019-02-12 NOTE — TELEPHONE ENCOUNTER
Rx Request  AmLODIPine Besylate 10 MG Oral Tab  lisinopril 40 MG Oral Tab    Disp:         90           R: 1      Associated Dx: Essential hypertension    Last Visit: 11/27/2018    Last Refilled: 11/27/2018    Protocol Passed?  Yes[ X ]       No[  ]

## 2019-02-15 LAB
ALBUMIN/GLOBULIN RATIO: 1.4 (CALC) (ref 1–2.5)
ALBUMIN: 4.1 G/DL (ref 3.6–5.1)
ALKALINE PHOSPHATASE: 57 U/L (ref 40–115)
ALT: 4 U/L (ref 9–46)
AST: 9 U/L (ref 10–35)
BILIRUBIN, TOTAL: 0.5 MG/DL (ref 0.2–1.2)
BUN: 11 MG/DL (ref 7–25)
CALCIUM: 8.5 MG/DL (ref 8.6–10.3)
CARBON DIOXIDE: 22 MMOL/L (ref 20–32)
CHLORIDE: 114 MMOL/L (ref 98–110)
CHOL/HDLC RATIO: 2.4 (CALC)
CHOLESTEROL, TOTAL: 139 MG/DL
CREATININE, RANDOM URINE: 194 MG/DL (ref 20–320)
CREATININE: 0.83 MG/DL (ref 0.7–1.33)
EGFR IF AFRICN AM: 112 ML/MIN/1.73M2
EGFR IF NONAFRICN AM: 96 ML/MIN/1.73M2
GLOBULIN: 3 G/DL (CALC) (ref 1.9–3.7)
GLUCOSE: 103 MG/DL (ref 65–99)
HDL CHOLESTEROL: 57 MG/DL
HEMOGLOBIN A1C: 5.5 % OF TOTAL HGB
LDL-CHOLESTEROL: 68 MG/DL (CALC)
MICROALBUMIN/CREATININE RATIO, RANDOM URINE: 6 MCG/MG CREAT
MICROALBUMIN: 1.2 MG/DL
NON-HDL CHOLESTEROL: 82 MG/DL (CALC)
POTASSIUM: 3.9 MMOL/L (ref 3.5–5.3)
PROTEIN, TOTAL: 7.1 G/DL (ref 6.1–8.1)
SODIUM: 143 MMOL/L (ref 135–146)
TRIGLYCERIDES: 59 MG/DL

## 2019-02-20 ENCOUNTER — OFFICE VISIT (OUTPATIENT)
Dept: FAMILY MEDICINE CLINIC | Facility: CLINIC | Age: 59
End: 2019-02-20
Payer: COMMERCIAL

## 2019-02-20 VITALS
HEART RATE: 87 BPM | HEIGHT: 64 IN | OXYGEN SATURATION: 98 % | RESPIRATION RATE: 16 BRPM | BODY MASS INDEX: 33.12 KG/M2 | DIASTOLIC BLOOD PRESSURE: 60 MMHG | SYSTOLIC BLOOD PRESSURE: 108 MMHG | TEMPERATURE: 98 F | WEIGHT: 194 LBS

## 2019-02-20 DIAGNOSIS — E11.42 DM TYPE 2 WITH DIABETIC PERIPHERAL NEUROPATHY (HCC): ICD-10-CM

## 2019-02-20 DIAGNOSIS — I69.354 HEMIPARESIS AFFECTING LEFT SIDE AS LATE EFFECT OF STROKE (HCC): ICD-10-CM

## 2019-02-20 DIAGNOSIS — Z00.00 ANNUAL PHYSICAL EXAM: Primary | ICD-10-CM

## 2019-02-20 DIAGNOSIS — I10 ESSENTIAL HYPERTENSION: ICD-10-CM

## 2019-02-20 DIAGNOSIS — E53.8 B12 DEFICIENCY: ICD-10-CM

## 2019-02-20 PROCEDURE — 99213 OFFICE O/P EST LOW 20 MIN: CPT | Performed by: FAMILY MEDICINE

## 2019-02-20 PROCEDURE — 90670 PCV13 VACCINE IM: CPT | Performed by: FAMILY MEDICINE

## 2019-02-20 PROCEDURE — 99396 PREV VISIT EST AGE 40-64: CPT | Performed by: FAMILY MEDICINE

## 2019-02-20 PROCEDURE — 90471 IMMUNIZATION ADMIN: CPT | Performed by: FAMILY MEDICINE

## 2019-02-20 NOTE — PROGRESS NOTES
Kelly Palma is a 60year old male who presents for a complete physical exam.   HPI:   Pt complains of nothing .  + calcium and vit D   No urinary symptoms  + erectile dysfunction- on viagra   No penile discharge  No snoring   No family h/o colon or pro 325 MG Oral Tab Take 1 Tab by mouth daily.  Disp:  Rfl:    MetFORMIN HCl 500 MG Oral Tab TAKE 1 TABLET BY MOUTH TWO  TIMES DAILY WITH MEALS Disp: 180 tablet Rfl: 1      Past Medical History:   Diagnosis Date   • Acute, but ill-defined, cerebrovascular disea • Weight loss March 2010      Past Surgical History:   Procedure Laterality Date   • COLONOSCOPY  April 2018   • LASER SURGERY OF EYE  9/2010   • OTHER SURGICAL HISTORY      URETHRAL DILATION   • SHOULDER SURG PROC UNLISTED  4/1/2010    Left subdeltoid s clear  EYES:PERRLA, EOMI, normal optic disk,conjunctiva are clear  NECK: supple,no adenopathy,no bruits  CHEST: no chest tenderness  BREAST: no dominant or suspicious mass  LUNGS: clear to auscultation  CARDIO: RRR without murmur  GI: good BS's,no masses, 02/14/2019 4 (L)   11/20/2018 3 (L)   06/21/2018 5 (L)      No results found for: MICROALBCREA      Current Outpatient Medications:  LISINOPRIL 40 MG Oral Tab TAKE 1 TABLET BY MOUTH ONCE DAILY Disp: 90 tablet Rfl: 1   AMLODIPINE BESYLATE 10 MG Oral Tab T Fatigue March 2010   • Feeling lonely March 2010   • Flatulence/gas pain/belching Since procedure, has lessened slightly.    • Frequent urination March 2010   • Headache March 2010   • Headache(784.0)    • Hyperlipidemia    • Hypertension    • Leg swelling breath  CV: denies chest pain or palpitations  GI: denies abdominal pain, heartburn, chronic diarrhea or constipation  NEURO: denies headaches or dizziness  PSYCH: denies depression or anxiety  NUTRITION:follows diabetic diet    EXAM:   /60   Pulse 8

## 2019-02-28 ENCOUNTER — NURSE ONLY (OUTPATIENT)
Dept: FAMILY MEDICINE CLINIC | Facility: CLINIC | Age: 59
End: 2019-02-28
Payer: COMMERCIAL

## 2019-02-28 PROCEDURE — 96372 THER/PROPH/DIAG INJ SC/IM: CPT | Performed by: INTERNAL MEDICINE

## 2019-02-28 RX ADMIN — CYANOCOBALAMIN 1000 MCG: 1000 INJECTION INTRAMUSCULAR; SUBCUTANEOUS at 10:13:00

## 2019-03-28 ENCOUNTER — NURSE ONLY (OUTPATIENT)
Dept: FAMILY MEDICINE CLINIC | Facility: CLINIC | Age: 59
End: 2019-03-28
Payer: COMMERCIAL

## 2019-03-28 PROCEDURE — 96372 THER/PROPH/DIAG INJ SC/IM: CPT | Performed by: FAMILY MEDICINE

## 2019-03-28 RX ADMIN — CYANOCOBALAMIN 1000 MCG: 1000 INJECTION INTRAMUSCULAR; SUBCUTANEOUS at 09:18:00

## 2019-04-06 DIAGNOSIS — G44.209 TENSION HEADACHE: ICD-10-CM

## 2019-04-08 RX ORDER — TOPIRAMATE 100 MG/1
TABLET, FILM COATED ORAL
Qty: 360 TABLET | Refills: 0 | Status: SHIPPED | OUTPATIENT
Start: 2019-04-08 | End: 2019-07-06

## 2019-04-08 NOTE — TELEPHONE ENCOUNTER
Medication: TOPIRAMATE 100 MG Oral Tab    Date of last refill: 11/15/18 (#360/1)  [place]     Last office visit: Visit date not found  Due back to clinic per last office note:           1  year  Date next office visit sc

## 2019-04-24 RX ORDER — BLOOD SUGAR DIAGNOSTIC
STRIP MISCELLANEOUS
Qty: 100 STRIP | Refills: 1 | Status: SHIPPED | OUTPATIENT
Start: 2019-04-24 | End: 2019-09-11

## 2019-04-30 ENCOUNTER — NURSE ONLY (OUTPATIENT)
Dept: FAMILY MEDICINE CLINIC | Facility: CLINIC | Age: 59
End: 2019-04-30
Payer: COMMERCIAL

## 2019-04-30 PROCEDURE — 96372 THER/PROPH/DIAG INJ SC/IM: CPT | Performed by: FAMILY MEDICINE

## 2019-04-30 RX ORDER — CYANOCOBALAMIN 1000 UG/ML
1000 INJECTION INTRAMUSCULAR; SUBCUTANEOUS ONCE
Status: COMPLETED | OUTPATIENT
Start: 2019-04-30 | End: 2019-04-30

## 2019-04-30 RX ADMIN — CYANOCOBALAMIN 1000 MCG: 1000 INJECTION INTRAMUSCULAR; SUBCUTANEOUS at 09:39:00

## 2019-05-14 RX ORDER — SILDENAFIL 100 MG/1
TABLET, FILM COATED ORAL
Qty: 16 TABLET | Refills: 0 | Status: SHIPPED | OUTPATIENT
Start: 2019-05-14 | End: 2019-08-30

## 2019-05-14 NOTE — TELEPHONE ENCOUNTER
Rx Request  Sildenafil Citrate 100 MG Oral Tab    Disp:       16           R:  0    Last Visit: 02/20/2019    Last Refilled: 11/27/2018

## 2019-05-29 ENCOUNTER — APPOINTMENT (OUTPATIENT)
Dept: LAB | Age: 59
End: 2019-05-29
Attending: FAMILY MEDICINE
Payer: COMMERCIAL

## 2019-05-29 PROCEDURE — 36415 COLL VENOUS BLD VENIPUNCTURE: CPT | Performed by: FAMILY MEDICINE

## 2019-05-29 PROCEDURE — 80053 COMPREHEN METABOLIC PANEL: CPT | Performed by: FAMILY MEDICINE

## 2019-05-29 PROCEDURE — 83036 HEMOGLOBIN GLYCOSYLATED A1C: CPT | Performed by: FAMILY MEDICINE

## 2019-05-30 ENCOUNTER — OFFICE VISIT (OUTPATIENT)
Dept: FAMILY MEDICINE CLINIC | Facility: CLINIC | Age: 59
End: 2019-05-30
Payer: COMMERCIAL

## 2019-05-30 VITALS
OXYGEN SATURATION: 97 % | TEMPERATURE: 97 F | SYSTOLIC BLOOD PRESSURE: 130 MMHG | RESPIRATION RATE: 18 BRPM | DIASTOLIC BLOOD PRESSURE: 60 MMHG | WEIGHT: 199 LBS | BODY MASS INDEX: 33.97 KG/M2 | HEART RATE: 67 BPM | HEIGHT: 64 IN

## 2019-05-30 DIAGNOSIS — E11.42 DM TYPE 2 WITH DIABETIC PERIPHERAL NEUROPATHY (HCC): ICD-10-CM

## 2019-05-30 DIAGNOSIS — I10 ESSENTIAL HYPERTENSION: ICD-10-CM

## 2019-05-30 DIAGNOSIS — I69.354 HEMIPARESIS AFFECTING LEFT SIDE AS LATE EFFECT OF STROKE (HCC): ICD-10-CM

## 2019-05-30 DIAGNOSIS — G44.209 TENSION HEADACHE: ICD-10-CM

## 2019-05-30 DIAGNOSIS — R53.83 OTHER FATIGUE: Primary | ICD-10-CM

## 2019-05-30 PROCEDURE — 96372 THER/PROPH/DIAG INJ SC/IM: CPT | Performed by: FAMILY MEDICINE

## 2019-05-30 PROCEDURE — 99214 OFFICE O/P EST MOD 30 MIN: CPT | Performed by: FAMILY MEDICINE

## 2019-05-30 RX ORDER — CYANOCOBALAMIN 1000 UG/ML
1000 INJECTION INTRAMUSCULAR; SUBCUTANEOUS ONCE
Status: COMPLETED | OUTPATIENT
Start: 2019-05-30 | End: 2019-05-30

## 2019-05-30 RX ADMIN — CYANOCOBALAMIN 1000 MCG: 1000 INJECTION INTRAMUSCULAR; SUBCUTANEOUS at 10:17:00

## 2019-05-30 NOTE — PROGRESS NOTES
HPI:   Beulah Baocn is a 60year old male who presents for recheck of his diabetes. Patient’s FBS have been 110-120's   HgA1C up a bit but still at goal  Kannan Cousin has been checking his feet on a regular basis. Kannan Cousin denies any tingling of the feet. Acetaminophen-Codeine #3 300-30 MG Oral Tab Take 1-2 tablets by mouth every 6 (six) hours as needed for Pain. Disp: 30 tablet Rfl: 0   Chlorhexidine Gluconate 0.12 % Mouth/Throat Solution  Disp:  Rfl:    BUTALBITAL-APAP-CAFFEINE OR Take by mouth.  Disp: Uncomfortable fullness after meals March 2010   • Unspecified cerebral artery occlusion with cerebral infarction    • Vitreous hemorrhage [facility])    • Wears glasses January 1998   • Weight loss March 2010      Past Surgical History:   Procedure Laterality Da intact      ASSESSMENT AND PLAN:   Negro Schreiber is a 60year old male who presents for a recheck of his diabetes. Discussed importance of medication, diet adherence, skin care and routine exercise. Reviewed good diabetic foot care.    Annual dilated

## 2019-07-06 DIAGNOSIS — G44.209 TENSION HEADACHE: ICD-10-CM

## 2019-07-08 RX ORDER — TOPIRAMATE 100 MG/1
TABLET, FILM COATED ORAL
Qty: 120 TABLET | Refills: 0 | Status: SHIPPED | OUTPATIENT
Start: 2019-07-08 | End: 2019-08-30

## 2019-07-18 DIAGNOSIS — I69.354 HEMIPARESIS AFFECTING LEFT SIDE AS LATE EFFECT OF STROKE (HCC): ICD-10-CM

## 2019-07-18 DIAGNOSIS — E11.42 DM TYPE 2 WITH DIABETIC PERIPHERAL NEUROPATHY (HCC): ICD-10-CM

## 2019-07-19 RX ORDER — SIMVASTATIN 40 MG
TABLET ORAL
Qty: 90 TABLET | Refills: 1 | Status: SHIPPED | OUTPATIENT
Start: 2019-07-19 | End: 2019-08-30

## 2019-08-23 ENCOUNTER — APPOINTMENT (OUTPATIENT)
Dept: LAB | Age: 59
End: 2019-08-23
Attending: FAMILY MEDICINE
Payer: COMMERCIAL

## 2019-08-23 LAB
ALBUMIN SERPL-MCNC: 4 G/DL (ref 3.4–5)
ALBUMIN/GLOB SERPL: 1.2 {RATIO} (ref 1–2)
ALP LIVER SERPL-CCNC: 68 U/L (ref 45–117)
ALT SERPL-CCNC: 9 U/L (ref 16–61)
ANION GAP SERPL CALC-SCNC: 6 MMOL/L (ref 0–18)
AST SERPL-CCNC: 8 U/L (ref 15–37)
BILIRUB SERPL-MCNC: 0.4 MG/DL (ref 0.1–2)
BUN BLD-MCNC: 10 MG/DL (ref 7–18)
BUN/CREAT SERPL: 10.9 (ref 10–20)
CALCIUM BLD-MCNC: 8.4 MG/DL (ref 8.5–10.1)
CHLORIDE SERPL-SCNC: 118 MMOL/L (ref 98–112)
CHOLEST SMN-MCNC: 128 MG/DL (ref ?–200)
CO2 SERPL-SCNC: 21 MMOL/L (ref 21–32)
CREAT BLD-MCNC: 0.92 MG/DL (ref 0.7–1.3)
EST. AVERAGE GLUCOSE BLD GHB EST-MCNC: 128 MG/DL (ref 68–126)
GLOBULIN PLAS-MCNC: 3.4 G/DL (ref 2.8–4.4)
GLUCOSE BLD-MCNC: 140 MG/DL (ref 70–99)
HBA1C MFR BLD HPLC: 6.1 % (ref ?–5.7)
HDLC SERPL-MCNC: 55 MG/DL (ref 40–59)
LDLC SERPL CALC-MCNC: 63 MG/DL (ref ?–100)
M PROTEIN MFR SERPL ELPH: 7.4 G/DL (ref 6.4–8.2)
NONHDLC SERPL-MCNC: 73 MG/DL (ref ?–130)
OSMOLALITY SERPL CALC.SUM OF ELEC: 301 MOSM/KG (ref 275–295)
POTASSIUM SERPL-SCNC: 3.8 MMOL/L (ref 3.5–5.1)
SODIUM SERPL-SCNC: 145 MMOL/L (ref 136–145)
TRIGL SERPL-MCNC: 50 MG/DL (ref 30–149)
VLDLC SERPL CALC-MCNC: 10 MG/DL (ref 0–30)

## 2019-08-23 PROCEDURE — 80061 LIPID PANEL: CPT | Performed by: FAMILY MEDICINE

## 2019-08-23 PROCEDURE — 80053 COMPREHEN METABOLIC PANEL: CPT | Performed by: FAMILY MEDICINE

## 2019-08-23 PROCEDURE — 83036 HEMOGLOBIN GLYCOSYLATED A1C: CPT | Performed by: FAMILY MEDICINE

## 2019-08-23 PROCEDURE — 36415 COLL VENOUS BLD VENIPUNCTURE: CPT | Performed by: FAMILY MEDICINE

## 2019-08-30 ENCOUNTER — OFFICE VISIT (OUTPATIENT)
Dept: FAMILY MEDICINE CLINIC | Facility: CLINIC | Age: 59
End: 2019-08-30
Payer: COMMERCIAL

## 2019-08-30 VITALS
OXYGEN SATURATION: 98 % | TEMPERATURE: 98 F | HEIGHT: 64 IN | WEIGHT: 201 LBS | HEART RATE: 88 BPM | DIASTOLIC BLOOD PRESSURE: 72 MMHG | RESPIRATION RATE: 16 BRPM | SYSTOLIC BLOOD PRESSURE: 122 MMHG | BODY MASS INDEX: 34.31 KG/M2

## 2019-08-30 DIAGNOSIS — R37 SEXUAL DYSFUNCTION: ICD-10-CM

## 2019-08-30 DIAGNOSIS — E53.8 B12 DEFICIENCY: Primary | ICD-10-CM

## 2019-08-30 DIAGNOSIS — E11.42 DM TYPE 2 WITH DIABETIC PERIPHERAL NEUROPATHY (HCC): ICD-10-CM

## 2019-08-30 DIAGNOSIS — I10 ESSENTIAL HYPERTENSION: ICD-10-CM

## 2019-08-30 DIAGNOSIS — G44.209 TENSION HEADACHE: ICD-10-CM

## 2019-08-30 DIAGNOSIS — I69.354 HEMIPARESIS AFFECTING LEFT SIDE AS LATE EFFECT OF STROKE (HCC): ICD-10-CM

## 2019-08-30 PROCEDURE — 99214 OFFICE O/P EST MOD 30 MIN: CPT | Performed by: FAMILY MEDICINE

## 2019-08-30 RX ORDER — AMLODIPINE BESYLATE 10 MG/1
10 TABLET ORAL
Qty: 90 TABLET | Refills: 1 | Status: SHIPPED | OUTPATIENT
Start: 2019-08-30 | End: 2019-12-02

## 2019-08-30 RX ORDER — TOPIRAMATE 100 MG/1
TABLET, FILM COATED ORAL
Qty: 120 TABLET | Refills: 0 | Status: SHIPPED | OUTPATIENT
Start: 2019-08-30 | End: 2019-09-10

## 2019-08-30 RX ORDER — LISINOPRIL 40 MG/1
40 TABLET ORAL
Qty: 90 TABLET | Refills: 1 | Status: SHIPPED | OUTPATIENT
Start: 2019-08-30 | End: 2019-12-02

## 2019-08-30 RX ORDER — SILDENAFIL 100 MG/1
100 TABLET, FILM COATED ORAL
Qty: 16 TABLET | Refills: 1 | Status: SHIPPED | OUTPATIENT
Start: 2019-08-30 | End: 2019-12-02

## 2019-08-30 RX ORDER — SIMVASTATIN 40 MG
40 TABLET ORAL NIGHTLY
Qty: 90 TABLET | Refills: 1 | Status: SHIPPED | OUTPATIENT
Start: 2019-08-30 | End: 2019-12-02

## 2019-08-30 NOTE — PROGRESS NOTES
HPI:   Leora Jordan is a 60year old male who presents for recheck of his diabetes.      Patient’s FBS have been 110-120's   HgA1C up a bit but still at goal  Pt has been working out less  Diet ok   Helping family move   Flynn Meléndez has been checking his feet TABLET BY MOUTH ONCE DAILY Disp: 90 tablet Rfl: 1   MetFORMIN HCl 500 MG Oral Tab TAKE 1 TABLET BY MOUTH TWO  TIMES DAILY WITH MEALS Disp: 180 tablet Rfl: 1   PREVIDENT 5000 DRY MOUTH 1.1 % Dental Gel USE IT AS A REGULAR TOOTHPASTE TID FOR 2 MINUTES.  Disp: [facility])    • Syncope and collapse    • Tendonitis    • Tension headache    • Thalamic infarction [facility])     bilateral thalamic infarction   • 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, not stated as uncontrolled(250.50)    • no cyanosis, clubbing or edema, bilateral foot exam unremarkable for open skin or lesions, bilateral 2+ DP, normal visual inspection bilaterally  NEURO: sensation is intact to monofilament bilaterally   PSYCH: judgement and insight are appropriate & intact

## 2019-09-10 ENCOUNTER — TELEPHONE (OUTPATIENT)
Dept: NEUROLOGY | Facility: CLINIC | Age: 59
End: 2019-09-10

## 2019-09-10 ENCOUNTER — OFFICE VISIT (OUTPATIENT)
Dept: NEUROLOGY | Facility: CLINIC | Age: 59
End: 2019-09-10
Payer: COMMERCIAL

## 2019-09-10 VITALS
RESPIRATION RATE: 16 BRPM | SYSTOLIC BLOOD PRESSURE: 108 MMHG | BODY MASS INDEX: 34 KG/M2 | HEART RATE: 64 BPM | DIASTOLIC BLOOD PRESSURE: 60 MMHG | WEIGHT: 200 LBS

## 2019-09-10 DIAGNOSIS — G44.009 CLUSTER HEADACHE, NOT INTRACTABLE, UNSPECIFIED CHRONICITY PATTERN: Primary | ICD-10-CM

## 2019-09-10 DIAGNOSIS — R26.9 GAIT ABNORMALITY: ICD-10-CM

## 2019-09-10 DIAGNOSIS — R41.0 CONFUSION: ICD-10-CM

## 2019-09-10 DIAGNOSIS — G44.209 TENSION HEADACHE: ICD-10-CM

## 2019-09-10 DIAGNOSIS — I69.354 HEMIPARESIS AFFECTING LEFT SIDE AS LATE EFFECT OF STROKE (HCC): ICD-10-CM

## 2019-09-10 PROCEDURE — 99215 OFFICE O/P EST HI 40 MIN: CPT | Performed by: OTHER

## 2019-09-10 RX ORDER — TOPIRAMATE 100 MG/1
TABLET, FILM COATED ORAL
Qty: 120 TABLET | Refills: 11 | Status: SHIPPED | OUTPATIENT
Start: 2019-09-10 | End: 2019-09-12

## 2019-09-10 RX ORDER — BUTALBITAL, ACETAMINOPHEN AND CAFFEINE 50; 325; 40 MG/1; MG/1; MG/1
1 CAPSULE ORAL EVERY 6 HOURS PRN
Qty: 30 CAPSULE | Refills: 0 | Status: SHIPPED | OUTPATIENT
Start: 2019-09-10 | End: 2021-11-11

## 2019-09-10 NOTE — TELEPHONE ENCOUNTER
Pt brought paperwork into office during office visit. Physicians statement completed, office visit notes printed and given to patient along with completed and signed Physicians statement. Copy sent to scanning with TE scanning cover sheet.

## 2019-09-10 NOTE — PROGRESS NOTES
[facility] in [place] with [facility]  Neurology - Clinic Follow up  9/10/2019    Lucretia Mcgarry Patient Status:  No patient class for patient encounter    11/15/1960 MRN [id number]   FZFW Flatulence/gas pain/belching Since procedure, has lessened slightly.    • Frequent urination March 2010   • Headache March 2010   • Headache(784.0)    • Hyperlipidemia    • Hypertension    • Leg swelling March 2010   • Loss of appetite March 2010   • Muscle tablet (100 mg total) by mouth daily as needed for Erectile Dysfunction. Disp: 16 tablet Rfl: 1   lisinopril 40 MG Oral Tab Take 1 tablet (40 mg total) by mouth once daily.  Disp: 90 tablet Rfl: 1   amLODIPine Besylate 10 MG Oral Tab Take 1 tablet (10 mg to weakness from old stroke, he has also weakness in his L.  HF 4/5, he is better in HF weakness, L. FE 4/5,   Sensory: decreased pain in his L.  Side arm and leg from stroke,   DTRs   Symmetric 2/4 in all limbs,   No Babinski sign,   COORDINATION: Normal FTN related paper at his request,       Return in about 1 year (around 9/10/2020).         Mynor Salcido MD  General Neurology and Electrodiagnostic specialist  [facility]  9/10/2019

## 2019-09-10 NOTE — PATIENT INSTRUCTIONS
Refill policies:    • Allow 2-3 business days for refills; controlled substances may take longer.   • Contact your pharmacy at least 5 days prior to running out of medication and have them send an electronic request or submit request through the “request re Depending on your insurance carrier, approval may take 3-10 days. It is highly recommended patients contact their insurance carrier directly to determine coverage.   If test is done without insurance authorization, patient may be responsible for the entire Check EEG  ESR, DUONG, to call him with result.

## 2019-09-12 ENCOUNTER — TELEPHONE (OUTPATIENT)
Dept: NEUROLOGY | Facility: CLINIC | Age: 59
End: 2019-09-12

## 2019-09-12 DIAGNOSIS — G44.209 TENSION HEADACHE: ICD-10-CM

## 2019-09-12 RX ORDER — BLOOD SUGAR DIAGNOSTIC
STRIP MISCELLANEOUS
Qty: 100 STRIP | Refills: 1 | Status: SHIPPED | OUTPATIENT
Start: 2019-09-12 | End: 2020-03-02

## 2019-09-12 RX ORDER — TOPIRAMATE 100 MG/1
TABLET, FILM COATED ORAL
Qty: 360 TABLET | Refills: 3 | COMMUNITY
Start: 2019-09-12 | End: 2020-07-21

## 2019-09-12 NOTE — TELEPHONE ENCOUNTER
Received fax from [address] [place] [address]  stating [phone] [facility] [phone] has been discontinued by . spoke with Rose Wahl, pharmacist she states that fax was sent by error and disregard. Pharmacist also requested the Topiramate Rx be changed to 90 day supply.

## 2019-10-21 ENCOUNTER — TELEPHONE (OUTPATIENT)
Dept: FAMILY MEDICINE CLINIC | Facility: CLINIC | Age: 59
End: 2019-10-21

## 2019-10-21 NOTE — TELEPHONE ENCOUNTER
MEJIA Done 10/11/2019    Retinopthy Mild nonproliferative  w/o Macular edema OU, stable proliferative ret Left Eye, DIABETIC CATARACT

## 2019-11-25 ENCOUNTER — APPOINTMENT (OUTPATIENT)
Dept: LAB | Age: 59
End: 2019-11-25
Attending: FAMILY MEDICINE
Payer: COMMERCIAL

## 2019-11-25 PROCEDURE — 80053 COMPREHEN METABOLIC PANEL: CPT | Performed by: FAMILY MEDICINE

## 2019-11-25 PROCEDURE — 36415 COLL VENOUS BLD VENIPUNCTURE: CPT | Performed by: FAMILY MEDICINE

## 2019-11-25 PROCEDURE — 80061 LIPID PANEL: CPT | Performed by: FAMILY MEDICINE

## 2019-11-25 PROCEDURE — 86431 RHEUMATOID FACTOR QUANT: CPT | Performed by: OTHER

## 2019-11-25 PROCEDURE — 85652 RBC SED RATE AUTOMATED: CPT | Performed by: OTHER

## 2019-11-25 PROCEDURE — 82607 VITAMIN B-12: CPT | Performed by: FAMILY MEDICINE

## 2019-11-25 PROCEDURE — 83036 HEMOGLOBIN GLYCOSYLATED A1C: CPT | Performed by: FAMILY MEDICINE

## 2019-11-25 PROCEDURE — 86038 ANTINUCLEAR ANTIBODIES: CPT | Performed by: OTHER

## 2019-11-30 NOTE — PROGRESS NOTES
HPI:   Harmon Riedel is a 60year old male who presents for recheck of his diabetes. Patient’s FBS have been 110-120's   HgA1C is better   Pt working out more and eating healthy   Luis Daniel Gutierrez has been checking his feet on a regular basis.    Luis Daniel Gutierrez denies an total) by mouth daily as needed for Erectile Dysfunction. 16 tablet 1   • lisinopril 40 MG Oral Tab Take 1 tablet (40 mg total) by mouth once daily. 90 tablet 1   • amLODIPine Besylate 10 MG Oral Tab Take 1 tablet (10 mg total) by mouth once daily.  90 tabl dysfunction    • Shortness of breath March 2010   • Sleep disturbance March 2010   • Stool incontinence March 2017   • Stroke [facility])    • Syncope and collapse    • Tendonitis    • Tension headache    • Thalamic infarction [facility])     bilateral thalamic infarc thyromegaly  LUNGS: clear to auscultation, easy breathing, no cough  CV: normal S1 S2, RRR without murmur  GI: good BS's, no masses, no HSM or tenderness  EXT: no cyanosis, clubbing or edema, bilateral foot exam unremarkable for open skin or lesions, bilat

## 2019-12-02 ENCOUNTER — TELEPHONE (OUTPATIENT)
Dept: NEUROLOGY | Facility: CLINIC | Age: 59
End: 2019-12-02

## 2019-12-02 ENCOUNTER — OFFICE VISIT (OUTPATIENT)
Dept: FAMILY MEDICINE CLINIC | Facility: CLINIC | Age: 59
End: 2019-12-02
Payer: COMMERCIAL

## 2019-12-02 VITALS
HEIGHT: 64 IN | BODY MASS INDEX: 35 KG/M2 | SYSTOLIC BLOOD PRESSURE: 116 MMHG | TEMPERATURE: 98 F | RESPIRATION RATE: 18 BRPM | OXYGEN SATURATION: 98 % | WEIGHT: 205 LBS | HEART RATE: 59 BPM | DIASTOLIC BLOOD PRESSURE: 56 MMHG

## 2019-12-02 DIAGNOSIS — I10 ESSENTIAL HYPERTENSION: ICD-10-CM

## 2019-12-02 DIAGNOSIS — I69.354 HEMIPARESIS AFFECTING LEFT SIDE AS LATE EFFECT OF STROKE (HCC): ICD-10-CM

## 2019-12-02 DIAGNOSIS — Z23 NEED FOR VACCINATION: ICD-10-CM

## 2019-12-02 DIAGNOSIS — E11.42 DM TYPE 2 WITH DIABETIC PERIPHERAL NEUROPATHY (HCC): Primary | ICD-10-CM

## 2019-12-02 DIAGNOSIS — R37 SEXUAL DYSFUNCTION: ICD-10-CM

## 2019-12-02 PROCEDURE — 90471 IMMUNIZATION ADMIN: CPT | Performed by: FAMILY MEDICINE

## 2019-12-02 PROCEDURE — 90686 IIV4 VACC NO PRSV 0.5 ML IM: CPT | Performed by: FAMILY MEDICINE

## 2019-12-02 PROCEDURE — 99214 OFFICE O/P EST MOD 30 MIN: CPT | Performed by: FAMILY MEDICINE

## 2019-12-02 RX ORDER — AMLODIPINE BESYLATE 10 MG/1
10 TABLET ORAL
Qty: 90 TABLET | Refills: 1 | Status: SHIPPED | OUTPATIENT
Start: 2019-12-02 | End: 2020-03-10

## 2019-12-02 RX ORDER — LISINOPRIL 40 MG/1
40 TABLET ORAL
Qty: 90 TABLET | Refills: 1 | Status: SHIPPED | OUTPATIENT
Start: 2019-12-02 | End: 2020-03-10

## 2019-12-02 RX ORDER — SIMVASTATIN 40 MG
40 TABLET ORAL NIGHTLY
Qty: 90 TABLET | Refills: 1 | Status: SHIPPED | OUTPATIENT
Start: 2019-12-02 | End: 2020-03-10

## 2019-12-02 RX ORDER — SILDENAFIL 100 MG/1
100 TABLET, FILM COATED ORAL
Qty: 16 TABLET | Refills: 1 | Status: SHIPPED | OUTPATIENT
Start: 2019-12-02 | End: 2020-03-10

## 2019-12-02 NOTE — TELEPHONE ENCOUNTER
MLTCB on  (no identifier).       ----- Message from German Ramírez MD sent at 11/29/2019 12:52 PM CST -----  Normal

## 2019-12-04 ENCOUNTER — NURSE ONLY (OUTPATIENT)
Dept: ELECTROPHYSIOLOGY | Facility: HOSPITAL | Age: 59
End: 2019-12-04
Attending: Other
Payer: COMMERCIAL

## 2019-12-04 DIAGNOSIS — R41.0 CONFUSION: ICD-10-CM

## 2019-12-04 PROCEDURE — 95819 EEG AWAKE AND ASLEEP: CPT | Performed by: OTHER

## 2019-12-04 NOTE — TELEPHONE ENCOUNTER
MD CARMEN Nolasco Nurse             EEG is normal      Per below, message left to call back. When pt returns call, can relay blood work and EEG results.

## 2019-12-04 NOTE — PROCEDURES
, [address] [place] [address]      PATIENT'S NAME: Betty NATHAN   ATTENDING PHYSICIAN: Reena Duke M.D.    PATIENT ACCOUNT #: [de-identified] LOCATION: [facility]   MEDICAL RECORD #: [id number] YOB: 1960

## 2019-12-05 NOTE — TELEPHONE ENCOUNTER
Pt active on MyChart so I sent below test results for EEG and Blood work.  Advised to call office with any questions

## 2020-01-24 ENCOUNTER — TELEPHONE (OUTPATIENT)
Dept: FAMILY MEDICINE CLINIC | Facility: CLINIC | Age: 60
End: 2020-01-24

## 2020-01-24 NOTE — TELEPHONE ENCOUNTER
Supa from  [facility] calling to make sure Dr. Eun Fields knows there is drug interaction between amlodipine and simvistatin  Please call back

## 2020-01-24 NOTE — TELEPHONE ENCOUNTER
Pt has been on both these medications for over a year. Called pharmacy and advised of this information, verbalized understanding.

## 2020-03-02 RX ORDER — BLOOD SUGAR DIAGNOSTIC
STRIP MISCELLANEOUS
Qty: 100 STRIP | Refills: 1 | Status: SHIPPED | OUTPATIENT
Start: 2020-03-02 | End: 2020-08-18

## 2020-03-07 ENCOUNTER — APPOINTMENT (OUTPATIENT)
Dept: LAB | Facility: HOSPITAL | Age: 60
End: 2020-03-07
Attending: FAMILY MEDICINE
Payer: COMMERCIAL

## 2020-03-07 LAB
ALBUMIN SERPL-MCNC: 3.6 G/DL (ref 3.4–5)
ALBUMIN/GLOB SERPL: 0.9 {RATIO} (ref 1–2)
ALP LIVER SERPL-CCNC: 61 U/L (ref 45–117)
ALT SERPL-CCNC: 11 U/L (ref 16–61)
ANION GAP SERPL CALC-SCNC: 3 MMOL/L (ref 0–18)
AST SERPL-CCNC: 10 U/L (ref 15–37)
BILIRUB SERPL-MCNC: 0.6 MG/DL (ref 0.1–2)
BUN BLD-MCNC: 10 MG/DL (ref 7–18)
BUN/CREAT SERPL: 11.6 (ref 10–20)
CALCIUM BLD-MCNC: 8.4 MG/DL (ref 8.5–10.1)
CHLORIDE SERPL-SCNC: 109 MMOL/L (ref 98–112)
CHOLEST SMN-MCNC: 154 MG/DL (ref ?–200)
CO2 SERPL-SCNC: 28 MMOL/L (ref 21–32)
CREAT BLD-MCNC: 0.86 MG/DL (ref 0.7–1.3)
CREAT UR-SCNC: 209 MG/DL
EST. AVERAGE GLUCOSE BLD GHB EST-MCNC: 126 MG/DL (ref 68–126)
GLOBULIN PLAS-MCNC: 3.9 G/DL (ref 2.8–4.4)
GLUCOSE BLD-MCNC: 133 MG/DL (ref 70–99)
HBA1C MFR BLD HPLC: 6 % (ref ?–5.7)
HDLC SERPL-MCNC: 52 MG/DL (ref 40–59)
LDLC SERPL CALC-MCNC: 87 MG/DL (ref ?–100)
M PROTEIN MFR SERPL ELPH: 7.5 G/DL (ref 6.4–8.2)
MICROALBUMIN UR-MCNC: 4.89 MG/DL
MICROALBUMIN/CREAT 24H UR-RTO: 23.4 UG/MG (ref ?–30)
NONHDLC SERPL-MCNC: 102 MG/DL (ref ?–130)
OSMOLALITY SERPL CALC.SUM OF ELEC: 291 MOSM/KG (ref 275–295)
PATIENT FASTING Y/N/NP: YES
PATIENT FASTING Y/N/NP: YES
POTASSIUM SERPL-SCNC: 4.3 MMOL/L (ref 3.5–5.1)
SODIUM SERPL-SCNC: 140 MMOL/L (ref 136–145)
TRIGL SERPL-MCNC: 77 MG/DL (ref 30–149)
VLDLC SERPL CALC-MCNC: 15 MG/DL (ref 0–30)

## 2020-03-07 PROCEDURE — 80061 LIPID PANEL: CPT | Performed by: FAMILY MEDICINE

## 2020-03-07 PROCEDURE — 36415 COLL VENOUS BLD VENIPUNCTURE: CPT | Performed by: FAMILY MEDICINE

## 2020-03-07 PROCEDURE — 80053 COMPREHEN METABOLIC PANEL: CPT | Performed by: FAMILY MEDICINE

## 2020-03-07 PROCEDURE — 82043 UR ALBUMIN QUANTITATIVE: CPT | Performed by: FAMILY MEDICINE

## 2020-03-07 PROCEDURE — 83036 HEMOGLOBIN GLYCOSYLATED A1C: CPT | Performed by: FAMILY MEDICINE

## 2020-03-07 PROCEDURE — 82570 ASSAY OF URINE CREATININE: CPT | Performed by: FAMILY MEDICINE

## 2020-03-10 ENCOUNTER — OFFICE VISIT (OUTPATIENT)
Dept: FAMILY MEDICINE CLINIC | Facility: CLINIC | Age: 60
End: 2020-03-10
Payer: COMMERCIAL

## 2020-03-10 VITALS
OXYGEN SATURATION: 98 % | TEMPERATURE: 98 F | HEIGHT: 64 IN | WEIGHT: 216 LBS | DIASTOLIC BLOOD PRESSURE: 60 MMHG | BODY MASS INDEX: 36.88 KG/M2 | SYSTOLIC BLOOD PRESSURE: 110 MMHG | RESPIRATION RATE: 18 BRPM | HEART RATE: 58 BPM

## 2020-03-10 DIAGNOSIS — Z00.00 ANNUAL PHYSICAL EXAM: Primary | ICD-10-CM

## 2020-03-10 DIAGNOSIS — E11.42 DM TYPE 2 WITH DIABETIC PERIPHERAL NEUROPATHY (HCC): ICD-10-CM

## 2020-03-10 DIAGNOSIS — I69.354 HEMIPARESIS AFFECTING LEFT SIDE AS LATE EFFECT OF STROKE (HCC): ICD-10-CM

## 2020-03-10 DIAGNOSIS — R37 SEXUAL DYSFUNCTION: ICD-10-CM

## 2020-03-10 DIAGNOSIS — I10 ESSENTIAL HYPERTENSION: ICD-10-CM

## 2020-03-10 PROCEDURE — 99213 OFFICE O/P EST LOW 20 MIN: CPT | Performed by: FAMILY MEDICINE

## 2020-03-10 PROCEDURE — 90471 IMMUNIZATION ADMIN: CPT | Performed by: FAMILY MEDICINE

## 2020-03-10 PROCEDURE — 90750 HZV VACC RECOMBINANT IM: CPT | Performed by: FAMILY MEDICINE

## 2020-03-10 PROCEDURE — 99396 PREV VISIT EST AGE 40-64: CPT | Performed by: FAMILY MEDICINE

## 2020-03-10 RX ORDER — AMLODIPINE BESYLATE 10 MG/1
10 TABLET ORAL
Qty: 90 TABLET | Refills: 1 | Status: SHIPPED | OUTPATIENT
Start: 2020-03-10 | End: 2020-08-31

## 2020-03-10 RX ORDER — SIMVASTATIN 40 MG
40 TABLET ORAL NIGHTLY
Qty: 90 TABLET | Refills: 1 | Status: SHIPPED | OUTPATIENT
Start: 2020-03-10 | End: 2020-08-18

## 2020-03-10 RX ORDER — SILDENAFIL 100 MG/1
100 TABLET, FILM COATED ORAL
Qty: 16 TABLET | Refills: 1 | Status: SHIPPED | OUTPATIENT
Start: 2020-03-10

## 2020-03-10 RX ORDER — LISINOPRIL 40 MG/1
40 TABLET ORAL
Qty: 90 TABLET | Refills: 1 | Status: SHIPPED | OUTPATIENT
Start: 2020-03-10 | End: 2020-08-31

## 2020-03-10 NOTE — PROGRESS NOTES
Dagmar Ramirez is a 60year old male who presents for a complete physical exam.   HPI:   Pt complains of nothing .  + calcium and vit D   No urinary symptoms  + erectile dysfunction- on viagra   No penile discharge  No snoring   No family h/o colo tablet 1   • metFORMIN HCl 500 MG Oral Tab TAKE 1 TABLET BY MOUTH TWO  TIMES DAILY WITH MEALS 180 tablet 1   • topiramate 100 MG Oral Tab TAKE 4 TABLETS BY MOUTH  NIGHTLY 360 tablet 3   • Butalbital-APAP-Caffeine -40 MG Oral Cap Take 1 capsule by devin disturbance March 2010   • Stool incontinence March 2017   • Stroke [facility])    • Syncope and collapse    • Tendonitis    • Tension headache    • Thalamic infarction [facility])     bilateral thalamic infarction   • Type II or unspecified type diabetes mellitus wit back pain  NEURO: denies headaches  PSYCHE: denies depression or anxiety  HEMATOLOGIC: denies hx of anemia  ENDOCRINE: denies thyroid history  ALL/ASTHMA: denies asthma    EXAM:   /60   Pulse 58   Temp 98.2 °F (36.8 °C) (Oral)   Resp 18   Ht 64\"   W checking his feet on a regular basis. Jam Resendiz denies any tingling of the feet. Pt complains of poor balance; pt uses a cane since his CVA. Last eye exam: due ; Dr. Sabrina Soto h/o retinopathy      HEMOGLOBIN A1c (% of total Hgb)   Date Value   02/14/2019 5. Besylate 10 MG Oral Tab Take 1 tablet (10 mg total) by mouth once daily.  90 tablet 1   • metFORMIN HCl 500 MG Oral Tab TAKE 1 TABLET BY MOUTH TWO  TIMES DAILY WITH MEALS 180 tablet 1   • topiramate 100 MG Oral Tab TAKE 4 TABLETS BY MOUTH  NIGHTLY 360 table • Sexual dysfunction    • Shortness of breath March 2010   • Sleep disturbance March 2010   • Stool incontinence March 2017   • Stroke [facility])    • Syncope and collapse    • Tendonitis    • Tension headache    • Thalamic infarction [facility])     bilateral thal thyromegaly  LUNGS: clear to auscultation, easy breathing, no cough  CV: normal S1 S2, RRR without murmur  GI: good BS's, no masses, no HSM or tenderness  EXT: no cyanosis, clubbing or edema, bilateral foot exam unremarkable for open skin or lesions, bilat

## 2020-05-28 ENCOUNTER — LAB ENCOUNTER (OUTPATIENT)
Dept: LAB | Age: 60
End: 2020-05-28
Attending: FAMILY MEDICINE
Payer: COMMERCIAL

## 2020-05-28 DIAGNOSIS — Z00.00 ROUTINE GENERAL MEDICAL EXAMINATION AT A HEALTH CARE FACILITY: Primary | ICD-10-CM

## 2020-05-28 PROCEDURE — 84439 ASSAY OF FREE THYROXINE: CPT | Performed by: FAMILY MEDICINE

## 2020-05-28 PROCEDURE — 82306 VITAMIN D 25 HYDROXY: CPT | Performed by: FAMILY MEDICINE

## 2020-05-28 PROCEDURE — 82607 VITAMIN B-12: CPT | Performed by: FAMILY MEDICINE

## 2020-05-28 PROCEDURE — 80061 LIPID PANEL: CPT | Performed by: FAMILY MEDICINE

## 2020-05-28 PROCEDURE — 84153 ASSAY OF PSA TOTAL: CPT | Performed by: FAMILY MEDICINE

## 2020-05-28 PROCEDURE — 80050 GENERAL HEALTH PANEL: CPT | Performed by: FAMILY MEDICINE

## 2020-05-28 PROCEDURE — 36415 COLL VENOUS BLD VENIPUNCTURE: CPT | Performed by: FAMILY MEDICINE

## 2020-05-28 PROCEDURE — 83036 HEMOGLOBIN GLYCOSYLATED A1C: CPT | Performed by: FAMILY MEDICINE

## 2020-05-31 NOTE — PROGRESS NOTES
HPI:   Prashanth Dela Cruz is a 60year old male who presents for recheck of his diabetes.      Patient’s FBS have been 120's   HgA1C is better   Pt has gained weight, less active during quarantine   Pt working out more and eating healthy   Noreene Patient has be mouth daily as needed for Erectile Dysfunction. 16 tablet 1   • lisinopril 40 MG Oral Tab Take 1 tablet (40 mg total) by mouth once daily. 90 tablet 1   • amLODIPine Besylate 10 MG Oral Tab Take 1 tablet (10 mg total) by mouth once daily.  90 tablet 1   • m 2010   • Muscle weakness (generalized)    • Myofacial muscle pain    • Night sweats March 2010   • Obesity, unspecified    • Pain in joint, shoulder region    • Pain in joints March 2010   • Proliferative diabetic retinopathy(362.02)    • Radiculitis, cerv °C) (Skin)   Resp 16   Ht 64\"   Wt 220 lb (99.8 kg)   SpO2 98%   BMI 37.76 kg/m²    Body mass index is 37.76 kg/m².   GENERAL: well developed, well nourished, in no apparent distress  SKIN: no rashes,no suspicious lesions  NECK: supple,no adenopathy, no th

## 2020-06-01 ENCOUNTER — OFFICE VISIT (OUTPATIENT)
Dept: FAMILY MEDICINE CLINIC | Facility: CLINIC | Age: 60
End: 2020-06-01
Payer: COMMERCIAL

## 2020-06-01 VITALS
HEIGHT: 64 IN | RESPIRATION RATE: 16 BRPM | SYSTOLIC BLOOD PRESSURE: 114 MMHG | OXYGEN SATURATION: 98 % | TEMPERATURE: 98 F | HEART RATE: 60 BPM | DIASTOLIC BLOOD PRESSURE: 72 MMHG | WEIGHT: 220 LBS | BODY MASS INDEX: 37.56 KG/M2

## 2020-06-01 DIAGNOSIS — E11.42 DM TYPE 2 WITH DIABETIC PERIPHERAL NEUROPATHY (HCC): Primary | ICD-10-CM

## 2020-06-01 DIAGNOSIS — I69.354 HEMIPARESIS AFFECTING LEFT SIDE AS LATE EFFECT OF STROKE (HCC): ICD-10-CM

## 2020-06-01 DIAGNOSIS — E78.2 MIXED HYPERLIPIDEMIA: ICD-10-CM

## 2020-06-01 DIAGNOSIS — I10 ESSENTIAL HYPERTENSION: ICD-10-CM

## 2020-06-01 PROCEDURE — 99214 OFFICE O/P EST MOD 30 MIN: CPT | Performed by: FAMILY MEDICINE

## 2020-07-20 DIAGNOSIS — G44.209 TENSION HEADACHE: ICD-10-CM

## 2020-07-21 RX ORDER — TOPIRAMATE 100 MG/1
TABLET, FILM COATED ORAL
Qty: 360 TABLET | Refills: 0 | Status: SHIPPED | OUTPATIENT
Start: 2020-07-21 | End: 2020-11-10

## 2020-07-21 NOTE — TELEPHONE ENCOUNTER
Sent the patient a IPDIA message stating that they would need to make an appt for further medication refills.      Medication: TOPIRAMATE 100 MG Oral Tab    Date of last refill: 09/12/19 (#360/3)  [place]    Last of

## 2020-08-17 DIAGNOSIS — I69.354 HEMIPARESIS AFFECTING LEFT SIDE AS LATE EFFECT OF STROKE (HCC): ICD-10-CM

## 2020-08-17 DIAGNOSIS — E11.42 DM TYPE 2 WITH DIABETIC PERIPHERAL NEUROPATHY (HCC): ICD-10-CM

## 2020-08-18 RX ORDER — SIMVASTATIN 40 MG
TABLET ORAL
Qty: 90 TABLET | Refills: 3 | Status: SHIPPED | OUTPATIENT
Start: 2020-08-18 | End: 2021-07-06

## 2020-08-18 RX ORDER — BLOOD SUGAR DIAGNOSTIC
STRIP MISCELLANEOUS
Qty: 100 STRIP | Refills: 3 | Status: SHIPPED | OUTPATIENT
Start: 2020-08-18 | End: 2021-11-11

## 2020-08-18 NOTE — TELEPHONE ENCOUNTER
Next Appt:    With [facility] Chuy Calix, )  09/08/2020 at 11:00 AM      LV 6-1-20    LR BOTH 3-10-20

## 2020-08-30 DIAGNOSIS — I10 ESSENTIAL HYPERTENSION: ICD-10-CM

## 2020-08-31 RX ORDER — LISINOPRIL 40 MG/1
TABLET ORAL
Qty: 90 TABLET | Refills: 3 | Status: SHIPPED | OUTPATIENT
Start: 2020-08-31 | End: 2021-07-21

## 2020-08-31 RX ORDER — AMLODIPINE BESYLATE 10 MG/1
TABLET ORAL
Qty: 90 TABLET | Refills: 3 | Status: SHIPPED | OUTPATIENT
Start: 2020-08-31 | End: 2021-07-21

## 2020-08-31 NOTE — TELEPHONE ENCOUNTER
Next Appt:    With 7 [facility] Cherry Ramirez DO)  09/08/2020 at 11:00 AM    LV 6-1-20      LR BOTH 3-10-20

## 2020-09-01 ENCOUNTER — LAB ENCOUNTER (OUTPATIENT)
Dept: LAB | Age: 60
End: 2020-09-01
Attending: FAMILY MEDICINE
Payer: COMMERCIAL

## 2020-09-01 DIAGNOSIS — Z00.00 ROUTINE GENERAL MEDICAL EXAMINATION AT A HEALTH CARE FACILITY: Primary | ICD-10-CM

## 2020-09-01 LAB
ALBUMIN SERPL-MCNC: 3.4 G/DL (ref 3.4–5)
ALBUMIN/GLOB SERPL: 0.9 {RATIO} (ref 1–2)
ALP LIVER SERPL-CCNC: 58 U/L (ref 45–117)
ALT SERPL-CCNC: 12 U/L (ref 16–61)
ANION GAP SERPL CALC-SCNC: 3 MMOL/L (ref 0–18)
AST SERPL-CCNC: 11 U/L (ref 15–37)
BILIRUB SERPL-MCNC: 0.5 MG/DL (ref 0.1–2)
BUN BLD-MCNC: 7 MG/DL (ref 7–18)
BUN/CREAT SERPL: 10.8 (ref 10–20)
CALCIUM BLD-MCNC: 8.6 MG/DL (ref 8.5–10.1)
CHLORIDE SERPL-SCNC: 110 MMOL/L (ref 98–112)
CHOLEST SMN-MCNC: 137 MG/DL (ref ?–200)
CO2 SERPL-SCNC: 26 MMOL/L (ref 21–32)
COMPLEXED PSA SERPL-MCNC: 1.47 NG/ML (ref ?–4)
CREAT BLD-MCNC: 0.65 MG/DL (ref 0.7–1.3)
EST. AVERAGE GLUCOSE BLD GHB EST-MCNC: 143 MG/DL (ref 68–126)
GLOBULIN PLAS-MCNC: 3.6 G/DL (ref 2.8–4.4)
GLUCOSE BLD-MCNC: 108 MG/DL (ref 70–99)
HBA1C MFR BLD HPLC: 6.6 % (ref ?–5.7)
HDLC SERPL-MCNC: 65 MG/DL (ref 40–59)
LDLC SERPL CALC-MCNC: 61 MG/DL (ref ?–100)
M PROTEIN MFR SERPL ELPH: 7 G/DL (ref 6.4–8.2)
NONHDLC SERPL-MCNC: 72 MG/DL (ref ?–130)
OSMOLALITY SERPL CALC.SUM OF ELEC: 287 MOSM/KG (ref 275–295)
PATIENT FASTING Y/N/NP: YES
PATIENT FASTING Y/N/NP: YES
POTASSIUM SERPL-SCNC: 3.7 MMOL/L (ref 3.5–5.1)
SODIUM SERPL-SCNC: 139 MMOL/L (ref 136–145)
TRIGL SERPL-MCNC: 54 MG/DL (ref 30–149)
VLDLC SERPL CALC-MCNC: 11 MG/DL (ref 0–30)

## 2020-09-01 PROCEDURE — 80053 COMPREHEN METABOLIC PANEL: CPT | Performed by: FAMILY MEDICINE

## 2020-09-01 PROCEDURE — 84153 ASSAY OF PSA TOTAL: CPT | Performed by: FAMILY MEDICINE

## 2020-09-01 PROCEDURE — 83036 HEMOGLOBIN GLYCOSYLATED A1C: CPT | Performed by: FAMILY MEDICINE

## 2020-09-01 PROCEDURE — 36415 COLL VENOUS BLD VENIPUNCTURE: CPT | Performed by: FAMILY MEDICINE

## 2020-09-01 PROCEDURE — 80061 LIPID PANEL: CPT | Performed by: FAMILY MEDICINE

## 2020-09-07 NOTE — PROGRESS NOTES
HPI:   Iline Saint is a 60year old male who presents for recheck of his diabetes.      Patient’s FBS have been 120's   HgA1C is worse    Pt has gained weight, less active during quarantine   Pt working out more and eating healthy   Fabienne Galea has be 09/01/2020 137   05/28/2020 152   03/07/2020 154     CHOLESTEROL, TOTAL (mg/dL)   Date Value   02/14/2019 139   11/20/2018 123   06/21/2018 131     HDL Cholesterol (mg/dL)   Date Value   09/01/2020 65 (H)   05/28/2020 58   03/07/2020 52     HDL CHOLESTER FOR 2 MINUTES.  3   • Acetaminophen-Codeine #3 300-30 MG Oral Tab Take 1-2 tablets by mouth every 6 (six) hours as needed for Pain. 30 tablet 0   • Chlorhexidine Gluconate 0.12 % Mouth/Throat Solution as needed.        • Aspirin 325 MG Oral Tab Take 1 Tab b uncontrolled(250.50)    • Uncomfortable fullness after meals March 2010   • Unspecified cerebral artery occlusion with cerebral infarction    • Vitreous hemorrhage [facility])    • Wears glasses January 1998   • Weight loss March 2010      Past Surgical History: judgement and insight are appropriate & intact      ASSESSMENT AND PLAN:   Shasta Aparicio is a 60year old male who presents for a recheck of his diabetes. Discussed importance of medication, diet adherence, skin care and routine exercise.    Rev

## 2020-09-08 ENCOUNTER — OFFICE VISIT (OUTPATIENT)
Dept: FAMILY MEDICINE CLINIC | Facility: CLINIC | Age: 60
End: 2020-09-08
Payer: COMMERCIAL

## 2020-09-08 VITALS
OXYGEN SATURATION: 97 % | HEIGHT: 64 IN | WEIGHT: 225 LBS | SYSTOLIC BLOOD PRESSURE: 114 MMHG | RESPIRATION RATE: 16 BRPM | DIASTOLIC BLOOD PRESSURE: 70 MMHG | HEART RATE: 68 BPM | TEMPERATURE: 97 F | BODY MASS INDEX: 38.41 KG/M2

## 2020-09-08 DIAGNOSIS — I69.354 HEMIPARESIS AFFECTING LEFT SIDE AS LATE EFFECT OF STROKE (HCC): ICD-10-CM

## 2020-09-08 DIAGNOSIS — E11.42 DM TYPE 2 WITH DIABETIC PERIPHERAL NEUROPATHY (HCC): Primary | ICD-10-CM

## 2020-09-08 DIAGNOSIS — Z86.73 HISTORY OF CVA (CEREBROVASCULAR ACCIDENT): ICD-10-CM

## 2020-09-08 DIAGNOSIS — I10 ESSENTIAL HYPERTENSION: ICD-10-CM

## 2020-09-08 PROCEDURE — 3008F BODY MASS INDEX DOCD: CPT | Performed by: FAMILY MEDICINE

## 2020-09-08 PROCEDURE — 3078F DIAST BP <80 MM HG: CPT | Performed by: FAMILY MEDICINE

## 2020-09-08 PROCEDURE — 3074F SYST BP LT 130 MM HG: CPT | Performed by: FAMILY MEDICINE

## 2020-09-08 PROCEDURE — 99214 OFFICE O/P EST MOD 30 MIN: CPT | Performed by: FAMILY MEDICINE

## 2020-09-15 ENCOUNTER — NURSE ONLY (OUTPATIENT)
Dept: FAMILY MEDICINE CLINIC | Facility: CLINIC | Age: 60
End: 2020-09-15
Payer: COMMERCIAL

## 2020-09-15 PROCEDURE — 90471 IMMUNIZATION ADMIN: CPT | Performed by: FAMILY MEDICINE

## 2020-09-15 PROCEDURE — 90686 IIV4 VACC NO PRSV 0.5 ML IM: CPT | Performed by: FAMILY MEDICINE

## 2020-10-13 DIAGNOSIS — E11.42 DM TYPE 2 WITH DIABETIC PERIPHERAL NEUROPATHY (HCC): ICD-10-CM

## 2020-10-14 DIAGNOSIS — G44.209 TENSION HEADACHE: ICD-10-CM

## 2020-10-14 NOTE — TELEPHONE ENCOUNTER
Rx Request  metFORMIN HCl 500 MG Oral Tab    Disp:   180                 R: 1    Last Visit: 09/08/2020    Last Refilled: 03/10/2020    Protocol Passed?  Yes[ x ]       No[  ]

## 2020-10-15 NOTE — TELEPHONE ENCOUNTER
Medication: TOPIRAMATE 100 MG Oral Tab    Date of last refill: 7/21/2020 (#360/0)  [place]    Last office visit: 9/10/2020  Due back to clinic per last office note: 1 year  Date next office visit scheduled:    Future

## 2020-11-10 ENCOUNTER — TELEPHONE (OUTPATIENT)
Dept: NEUROLOGY | Facility: CLINIC | Age: 60
End: 2020-11-10

## 2020-11-10 ENCOUNTER — OFFICE VISIT (OUTPATIENT)
Dept: NEUROLOGY | Facility: CLINIC | Age: 60
End: 2020-11-10
Payer: COMMERCIAL

## 2020-11-10 VITALS
SYSTOLIC BLOOD PRESSURE: 120 MMHG | DIASTOLIC BLOOD PRESSURE: 60 MMHG | HEART RATE: 80 BPM | BODY MASS INDEX: 36 KG/M2 | RESPIRATION RATE: 16 BRPM | WEIGHT: 210 LBS

## 2020-11-10 DIAGNOSIS — G44.209 TENSION HEADACHE: ICD-10-CM

## 2020-11-10 DIAGNOSIS — G44.019 EPISODIC CLUSTER HEADACHE, NOT INTRACTABLE: ICD-10-CM

## 2020-11-10 DIAGNOSIS — M25.562 PAIN IN BOTH KNEES, UNSPECIFIED CHRONICITY: ICD-10-CM

## 2020-11-10 DIAGNOSIS — I69.351 HEMIPARESIS OF RIGHT DOMINANT SIDE AS LATE EFFECT OF CEREBRAL INFARCTION (HCC): ICD-10-CM

## 2020-11-10 DIAGNOSIS — M25.561 PAIN IN BOTH KNEES, UNSPECIFIED CHRONICITY: ICD-10-CM

## 2020-11-10 DIAGNOSIS — E53.8 B12 DEFICIENCY: ICD-10-CM

## 2020-11-10 DIAGNOSIS — R26.9 GAIT ABNORMALITY: Primary | ICD-10-CM

## 2020-11-10 DIAGNOSIS — I63.231 CEREBROVASCULAR ACCIDENT (CVA) DUE TO STENOSIS OF RIGHT CAROTID ARTERY (HCC): ICD-10-CM

## 2020-11-10 PROBLEM — I69.951 HEMIPARESIS OF RIGHT DOMINANT SIDE AS LATE EFFECT OF CEREBROVASCULAR DISEASE (HCC): Status: ACTIVE | Noted: 2020-11-10

## 2020-11-10 PROCEDURE — 99214 OFFICE O/P EST MOD 30 MIN: CPT | Performed by: OTHER

## 2020-11-10 PROCEDURE — 3078F DIAST BP <80 MM HG: CPT | Performed by: OTHER

## 2020-11-10 PROCEDURE — 3074F SYST BP LT 130 MM HG: CPT | Performed by: OTHER

## 2020-11-10 RX ORDER — TOPIRAMATE 100 MG/1
400 TABLET, FILM COATED ORAL NIGHTLY
Qty: 360 TABLET | Refills: 3 | Status: SHIPPED | OUTPATIENT
Start: 2020-11-10 | End: 2021-03-16 | Stop reason: ALTCHOICE

## 2020-11-10 NOTE — PROGRESS NOTES
[facility] in [place] with Evelia  Neurology - Clinic Follow up  11/10/2020    Odell Monterroso Patient Status:  No patient class for patient encounter    11/15/1960 MRN [id number] • Diabetes ([facility] [address] [facility]    • Diabetes mellitus ([facility]    • Diplopia    • Essential hypertension    • Fatigue March 2010   • Feeling lonely March 2010   • Flatulence/gas pain/belching Since procedure, has lessened slightly.    • Frequent urination March 2010   • H LISINOPRIL 40 MG Oral Tab TAKE 1 TABLET BY MOUTH ONCE DAILY 90 tablet 3   • AMLODIPINE BESYLATE 10 MG Oral Tab TAKE 1 TABLET BY MOUTH ONCE DAILY 90 tablet 3   • ONETOUCH VERIO In Vitro Strip USE WITH METER TO CHECK  BLOOD SUGAR ONCE A  strip 3   • S weakness in his L. Side, in deltoid, triceps 4/5, HE and KE mild weakness from old stroke, he has also weakness in his L.  HF 4/5, he is better in HF weakness, L. FE 4/5,   Sensory: decreased pain in his L.  Side arm and leg from stroke,   DTRs   Symmetric related paper at his request,       Return in about 1 year (around 11/10/2021).         Cullen Quiroz MD  General Neurology and Electrodiagnostic specialist  [facility]  11/10/2020

## 2020-11-10 NOTE — TELEPHONE ENCOUNTER
Paperwork was brought with patient and OV. Signed NITHIN and paperwork placed in appropriate area of RN file bin. Patient aware that paperwork can take up to 10 business days to complete. No fee since brought to [address].

## 2020-11-11 RX ORDER — TOPIRAMATE 100 MG/1
TABLET, FILM COATED ORAL
Qty: 360 TABLET | Refills: 3 | OUTPATIENT
Start: 2020-11-11

## 2020-11-20 NOTE — TELEPHONE ENCOUNTER
Completed paper work faxed to [employer] with fax confirmation received. Sent to scanning.

## 2020-12-01 ENCOUNTER — APPOINTMENT (OUTPATIENT)
Dept: LAB | Age: 60
End: 2020-12-01
Attending: FAMILY MEDICINE
Payer: COMMERCIAL

## 2020-12-01 PROCEDURE — 80061 LIPID PANEL: CPT | Performed by: FAMILY MEDICINE

## 2020-12-01 PROCEDURE — 80053 COMPREHEN METABOLIC PANEL: CPT | Performed by: FAMILY MEDICINE

## 2020-12-01 PROCEDURE — 36415 COLL VENOUS BLD VENIPUNCTURE: CPT | Performed by: FAMILY MEDICINE

## 2020-12-01 PROCEDURE — 83036 HEMOGLOBIN GLYCOSYLATED A1C: CPT | Performed by: FAMILY MEDICINE

## 2020-12-08 ENCOUNTER — OFFICE VISIT (OUTPATIENT)
Dept: FAMILY MEDICINE CLINIC | Facility: CLINIC | Age: 60
End: 2020-12-08
Payer: COMMERCIAL

## 2020-12-08 VITALS
RESPIRATION RATE: 18 BRPM | BODY MASS INDEX: 37.56 KG/M2 | WEIGHT: 220 LBS | OXYGEN SATURATION: 98 % | HEART RATE: 66 BPM | SYSTOLIC BLOOD PRESSURE: 130 MMHG | HEIGHT: 64 IN | DIASTOLIC BLOOD PRESSURE: 60 MMHG

## 2020-12-08 DIAGNOSIS — E11.42 DM TYPE 2 WITH DIABETIC PERIPHERAL NEUROPATHY (HCC): Primary | ICD-10-CM

## 2020-12-08 DIAGNOSIS — E78.2 MIXED HYPERLIPIDEMIA: ICD-10-CM

## 2020-12-08 DIAGNOSIS — I69.354 HEMIPARESIS AFFECTING LEFT SIDE AS LATE EFFECT OF STROKE (HCC): ICD-10-CM

## 2020-12-08 DIAGNOSIS — Z00.00 GENERAL MEDICAL EXAM: ICD-10-CM

## 2020-12-08 DIAGNOSIS — Z86.73 HISTORY OF CVA (CEREBROVASCULAR ACCIDENT): ICD-10-CM

## 2020-12-08 DIAGNOSIS — I10 ESSENTIAL HYPERTENSION: ICD-10-CM

## 2020-12-08 PROCEDURE — 3078F DIAST BP <80 MM HG: CPT | Performed by: FAMILY MEDICINE

## 2020-12-08 PROCEDURE — 3008F BODY MASS INDEX DOCD: CPT | Performed by: FAMILY MEDICINE

## 2020-12-08 PROCEDURE — 3075F SYST BP GE 130 - 139MM HG: CPT | Performed by: FAMILY MEDICINE

## 2020-12-08 PROCEDURE — 99214 OFFICE O/P EST MOD 30 MIN: CPT | Performed by: FAMILY MEDICINE

## 2020-12-08 RX ORDER — EMPAGLIFLOZIN 10 MG/1
10 TABLET, FILM COATED ORAL DAILY
Qty: 90 TABLET | Refills: 1 | Status: SHIPPED | OUTPATIENT
Start: 2020-12-08 | End: 2021-01-07

## 2020-12-08 NOTE — PROGRESS NOTES
HPI:   Tali Bergeron is a 63year old male who presents for recheck of his diabetes.      Patient’s FBS have been 120's   No side effects on the jardiance   HgA1C is better    Pt has lost weight   Pt working out more and eating healthy   Shereen Tay has Cholesterol (mg/dL)   Date Value   12/01/2020 76   09/01/2020 61   05/28/2020 80     LDL-CHOLESTEROL (mg/dL (calc))   Date Value   02/14/2019 68   11/20/2018 53   06/21/2018 56     AST (U/L)   Date Value   12/01/2020 12 (L)   09/01/2020 11 (L)   05/28/2020 but ill-defined, cerebrovascular disease March 2010   • Arthropathy     Thoracic   • Atypical mole January 2007   • B12 deficiency    • Back pain June 2005   • Backache, unspecified    • Background diabetic retinopathy(362.01)    • Black stools March 2017 UNLISTED  4/1/2010    Left subdeltoid steroid injection, Left shoulder bursitis      Social History: Social History    Tobacco Use      Smoking status: Never Smoker      Smokeless tobacco: Never Used    Alcohol use:  Yes      Alcohol/week: 0.0 standard drin ([facility]    - Empagliflozin (JARDIANCE) 10 MG Oral Tab; Take 10 mg by mouth daily. Dispense: 90 tablet; Refill: 1    2. Hemiparesis affecting left side as late effect of stroke ([facility]    - Empagliflozin (JARDIANCE) 10 MG Oral Tab; Take 10 mg by mouth daily.

## 2020-12-10 ENCOUNTER — TELEPHONE (OUTPATIENT)
Dept: FAMILY MEDICINE CLINIC | Facility: CLINIC | Age: 60
End: 2020-12-10

## 2020-12-10 RX ORDER — BLOOD SUGAR DIAGNOSTIC
STRIP MISCELLANEOUS
Qty: 100 EACH | Refills: 2 | Status: SHIPPED | OUTPATIENT
Start: 2020-12-10 | End: 2020-12-16

## 2020-12-10 NOTE — TELEPHONE ENCOUNTER
[facility] needs rx for contour next test strips, no longer carry one touch, please send new rx for new strips to [facility].  Only call pt back  if its a problem

## 2020-12-16 ENCOUNTER — TELEPHONE (OUTPATIENT)
Dept: FAMILY MEDICINE CLINIC | Facility: CLINIC | Age: 60
End: 2020-12-16

## 2020-12-16 RX ORDER — BLOOD SUGAR DIAGNOSTIC
STRIP MISCELLANEOUS
Qty: 100 EACH | Refills: 2 | Status: SHIPPED | OUTPATIENT
Start: 2020-12-16 | End: 2021-03-04

## 2020-12-16 NOTE — TELEPHONE ENCOUNTER
NEEDS AN RX FOR CONTOUR NEXT TEST STRIPS. HE TESTS BID. [facility] MAIL ORDER    PLS SEND A SCRIPT OVER TO THEM ASAP SO HE CAN TEST.

## 2020-12-21 ENCOUNTER — PATIENT MESSAGE (OUTPATIENT)
Dept: FAMILY MEDICINE CLINIC | Facility: CLINIC | Age: 60
End: 2020-12-21

## 2020-12-22 ENCOUNTER — PATIENT MESSAGE (OUTPATIENT)
Dept: FAMILY MEDICINE CLINIC | Facility: CLINIC | Age: 60
End: 2020-12-22

## 2020-12-22 NOTE — TELEPHONE ENCOUNTER
From: Maddy Pump  To: Srini Barger DO  Sent: 12/21/2020 6:29 PM CST  Subject: Other    Good Morning Dr Victoriano Villa,   I would like to get a vaccine for COVID-19, what do I have to do to get it? Can You help me please.    Kannan Bill

## 2020-12-22 NOTE — TELEPHONE ENCOUNTER
From: William Ramos  To: Jazlyn Rao DO  Sent: 12/22/2020 12:24 PM CST  Subject: Visit Follow-up Question    Per the [employer] website, underlying conditions would place someone in the second phase.    Having had a stroke, with high blood pressure

## 2021-01-07 ENCOUNTER — OFFICE VISIT (OUTPATIENT)
Dept: PHYSICAL THERAPY | Age: 61
End: 2021-01-07
Attending: Other
Payer: COMMERCIAL

## 2021-01-11 ENCOUNTER — OFFICE VISIT (OUTPATIENT)
Dept: PHYSICAL THERAPY | Age: 61
End: 2021-01-11
Attending: Other
Payer: COMMERCIAL

## 2021-01-11 ENCOUNTER — TELEPHONE (OUTPATIENT)
Dept: FAMILY MEDICINE CLINIC | Facility: CLINIC | Age: 61
End: 2021-01-11

## 2021-01-11 DIAGNOSIS — R26.9 GAIT ABNORMALITY: ICD-10-CM

## 2021-01-11 PROCEDURE — 97110 THERAPEUTIC EXERCISES: CPT | Performed by: PHYSICAL THERAPIST

## 2021-01-11 PROCEDURE — 97163 PT EVAL HIGH COMPLEX 45 MIN: CPT | Performed by: PHYSICAL THERAPIST

## 2021-01-11 NOTE — PROGRESS NOTES
BACK EVALUATION:    Referring Physician: Carlo Abdalal    DX Code: Gait abnormality (R26.9)     PT DX: Gait abnormality (R26.9)    PCP:     Age: 63 Occupation: retired    DOI: 6 months  DOS: -         SUBJECTIVE:   HX of Symptoms: CVA over 10 years ago, pt fee focus on the following goals:       Long term goals to be reached in 12-16 visits. · Increase pain free range of motion of left wrist flexion to 100% of normal limits.     · increase MMT of left ankle, knee and hip to 4/5 to allow patient to safely ambulat

## 2021-01-11 NOTE — TELEPHONE ENCOUNTER
Dr. Reynaldo Verduzco,  Patient REFUSES to speak to me - he was made aware I am the Clinical Lead. He stated you texted him and he is returning a call. He WOULD NOT let me speak. I had to talk over him to tell him you could not call him back until Wednesday.

## 2021-01-14 ENCOUNTER — OFFICE VISIT (OUTPATIENT)
Dept: PHYSICAL THERAPY | Age: 61
End: 2021-01-14
Attending: Other
Payer: COMMERCIAL

## 2021-01-14 DIAGNOSIS — R26.9 GAIT ABNORMALITY: ICD-10-CM

## 2021-01-14 PROCEDURE — 97110 THERAPEUTIC EXERCISES: CPT | Performed by: PHYSICAL THERAPIST

## 2021-01-14 PROCEDURE — 97116 GAIT TRAINING THERAPY: CPT | Performed by: PHYSICAL THERAPIST

## 2021-01-14 NOTE — PROGRESS NOTES
Dx: gait abnormality         Authorized # of Visits:  12         Next MD visit: none scheduled  Fall Risk: standard         Precautions: n/a           Medication Changes since last visit?: No  Subjective: doing HEP, no issues, left leg feels a bit stronger

## 2021-01-18 ENCOUNTER — OFFICE VISIT (OUTPATIENT)
Dept: PHYSICAL THERAPY | Age: 61
End: 2021-01-18
Attending: Other
Payer: COMMERCIAL

## 2021-01-18 PROCEDURE — 97116 GAIT TRAINING THERAPY: CPT | Performed by: PHYSICAL THERAPIST

## 2021-01-18 PROCEDURE — 97110 THERAPEUTIC EXERCISES: CPT | Performed by: PHYSICAL THERAPIST

## 2021-01-18 NOTE — PROGRESS NOTES
Dx: gait abnormality         Authorized # of Visits:  12         Next MD visit: none scheduled  Fall Risk: standard         Precautions: n/a           Medication Changes since last visit?: No     Subjective: doing HEP, feeling better than last visit.     Ob

## 2021-01-22 ENCOUNTER — OFFICE VISIT (OUTPATIENT)
Dept: PHYSICAL THERAPY | Age: 61
End: 2021-01-22
Attending: Other
Payer: COMMERCIAL

## 2021-01-22 PROCEDURE — 97116 GAIT TRAINING THERAPY: CPT | Performed by: PHYSICAL THERAPIST

## 2021-01-22 PROCEDURE — 97110 THERAPEUTIC EXERCISES: CPT | Performed by: PHYSICAL THERAPIST

## 2021-01-22 NOTE — PROGRESS NOTES
Dx: gait abnormality         Authorized # of Visits:  12         Next MD visit: none scheduled  Fall Risk: standard         Precautions: n/a           Medication Changes since last visit?: No     Subjective: doing HEP, feeling stronger, some oral issues go strength.     Skilled Services: TE, gt    Charges: TE2, gt1       Total Timed Treatment: 47 min  Total Treatment Time: 50 min

## 2021-01-25 ENCOUNTER — OFFICE VISIT (OUTPATIENT)
Dept: PHYSICAL THERAPY | Age: 61
End: 2021-01-25
Attending: Other
Payer: COMMERCIAL

## 2021-01-25 PROCEDURE — 97116 GAIT TRAINING THERAPY: CPT | Performed by: PHYSICAL THERAPIST

## 2021-01-25 PROCEDURE — 97110 THERAPEUTIC EXERCISES: CPT | Performed by: PHYSICAL THERAPIST

## 2021-01-25 NOTE — PROGRESS NOTES
Dx: gait abnormality         Authorized # of Visits:  12         Next MD visit: none scheduled  Fall Risk: standard         Precautions: n/a           Medication Changes since last visit?: No     Subjective: Oral pain is variable, going to to dentist today cont current HEP, increase activity gradually, challenging balance, coordination and strength.     Skilled Services: TE, gt    Charges: TE2, gt1       Total Timed Treatment: 45 min  Total Treatment Time: 50 min

## 2021-01-29 ENCOUNTER — OFFICE VISIT (OUTPATIENT)
Dept: PHYSICAL THERAPY | Age: 61
End: 2021-01-29
Attending: Other
Payer: COMMERCIAL

## 2021-01-29 PROCEDURE — 97116 GAIT TRAINING THERAPY: CPT | Performed by: PHYSICAL THERAPIST

## 2021-01-29 PROCEDURE — 97110 THERAPEUTIC EXERCISES: CPT | Performed by: PHYSICAL THERAPIST

## 2021-01-29 NOTE — PROGRESS NOTES
Dx: gait abnormality         Authorized # of Visits:  12         Next MD visit: none scheduled  Fall Risk: standard         Precautions: n/a           Medication Changes since last visit?: No     Subjective: Oral pain is improved after seeing the dentist, to return to previous level of function for leisure activities  · pt to demonstrate good safety awareness and no falls within 2 weeks of discharge  · Pt. will be independent with home exercise program and self management.     Plan: cont current HEP, joeas

## 2021-02-01 ENCOUNTER — OFFICE VISIT (OUTPATIENT)
Dept: PHYSICAL THERAPY | Age: 61
End: 2021-02-01
Attending: Other
Payer: COMMERCIAL

## 2021-02-01 PROCEDURE — 97116 GAIT TRAINING THERAPY: CPT | Performed by: PHYSICAL THERAPIST

## 2021-02-01 PROCEDURE — 97110 THERAPEUTIC EXERCISES: CPT | Performed by: PHYSICAL THERAPIST

## 2021-02-01 NOTE — PROGRESS NOTES
Dx: gait abnormality         Authorized # of Visits:  12         Next MD visit: none scheduled  Fall Risk: standard         Precautions: n/a           Medication Changes since last visit?: No     Subjective: fell while cleaning snow off car, feeling ok now to return to previous level of function for leisure activities  · pt to demonstrate good safety awareness and no falls within 2 weeks of discharge  · Pt. will be independent with home exercise program and self management.     Plan: cont current HEP, contact

## 2021-02-05 ENCOUNTER — APPOINTMENT (OUTPATIENT)
Dept: PHYSICAL THERAPY | Age: 61
End: 2021-02-05
Attending: Other
Payer: COMMERCIAL

## 2021-02-10 ENCOUNTER — OFFICE VISIT (OUTPATIENT)
Dept: PHYSICAL THERAPY | Age: 61
End: 2021-02-10
Attending: Other
Payer: COMMERCIAL

## 2021-02-10 PROCEDURE — 97110 THERAPEUTIC EXERCISES: CPT | Performed by: PHYSICAL THERAPIST

## 2021-02-10 PROCEDURE — 97140 MANUAL THERAPY 1/> REGIONS: CPT | Performed by: PHYSICAL THERAPIST

## 2021-02-10 NOTE — PROGRESS NOTES
Dx: gait abnormality         Authorized # of Visits:  12         Next MD visit: none scheduled  Fall Risk: standard         Precautions: n/a           Medication Changes since last visit?: No     Subjective: doing ok at home, hasn't been able to get out of 12-16 visits. · Increase pain free range of motion of left wrist flexion to 100% of normal limits. · increase MMT of left ankle, knee and hip to 4/5 to allow patient to safely ambulate 10 min on level surface without fear of falling.   · LEISURE:  Pt wi

## 2021-02-23 ENCOUNTER — OFFICE VISIT (OUTPATIENT)
Dept: PHYSICAL THERAPY | Age: 61
End: 2021-02-23
Attending: Other
Payer: COMMERCIAL

## 2021-02-23 PROCEDURE — 97110 THERAPEUTIC EXERCISES: CPT | Performed by: PHYSICAL THERAPIST

## 2021-02-23 PROCEDURE — 97140 MANUAL THERAPY 1/> REGIONS: CPT | Performed by: PHYSICAL THERAPIST

## 2021-02-23 NOTE — PROGRESS NOTES
Dx: gait abnormality         Authorized # of Visits:  12         Next MD visit: none scheduled  Fall Risk: standard         Precautions: n/a           Medication Changes since last visit?: No     Subjective: doing ok at home, not walking much, trying to st 5x10 rows B arms 25# 5x10 rows B arms 25# 5x10             Supine bridging 3x10           Man mobs to left MTP joints x 12 min Man mobs/STM to left wrist/hand x 14 min                   Assessment: pt ambulating well with good balance, L wrist motion impro

## 2021-02-26 ENCOUNTER — OFFICE VISIT (OUTPATIENT)
Dept: PHYSICAL THERAPY | Age: 61
End: 2021-02-26
Attending: Other
Payer: COMMERCIAL

## 2021-02-26 PROCEDURE — 97140 MANUAL THERAPY 1/> REGIONS: CPT | Performed by: PHYSICAL THERAPIST

## 2021-02-26 PROCEDURE — 97110 THERAPEUTIC EXERCISES: CPT | Performed by: PHYSICAL THERAPIST

## 2021-02-26 NOTE — PROGRESS NOTES
Dx: gait abnormality         Authorized # of Visits:  12         Next MD visit: none scheduled  Fall Risk: standard         Precautions: n/a           Medication Changes since last visit?: No     Subjective: doing ok at home, L wrist feels better, less tig x 10 Cable pulley retro walk 25# x 10 Cable pulley retro walk 25# x 10       Seated rows L arm 10# 2x10 Seated rows L arm 10# 5x10 rows B arms 20# 5x10 rows B arms 25# 5x10 rows B arms 25# 5x10             Supine bridging 3x10           Man mobs to left MT

## 2021-03-02 ENCOUNTER — OFFICE VISIT (OUTPATIENT)
Dept: PHYSICAL THERAPY | Age: 61
End: 2021-03-02
Attending: Other
Payer: COMMERCIAL

## 2021-03-02 PROCEDURE — 97140 MANUAL THERAPY 1/> REGIONS: CPT | Performed by: PHYSICAL THERAPIST

## 2021-03-02 PROCEDURE — 97110 THERAPEUTIC EXERCISES: CPT | Performed by: PHYSICAL THERAPIST

## 2021-03-02 NOTE — PROGRESS NOTES
Dx: gait abnormality         Authorized # of Visits:  12         Next MD visit: none scheduled  Fall Risk: standard         Precautions: n/a           Medication Changes since last visit?: No     Subjective: doing ok at home, L wrist feels better, less tig 12-16 visits. · Increase pain free range of motion of left wrist flexion to 100% of normal limits. · increase MMT of left ankle, knee and hip to 4/5 to allow patient to safely ambulate 10 min on level surface without fear of falling.   · LEISURE:  Pt wi

## 2021-03-04 ENCOUNTER — OFFICE VISIT (OUTPATIENT)
Dept: PHYSICAL THERAPY | Age: 61
End: 2021-03-04
Attending: Other
Payer: COMMERCIAL

## 2021-03-04 PROCEDURE — 97110 THERAPEUTIC EXERCISES: CPT | Performed by: PHYSICAL THERAPIST

## 2021-03-04 RX ORDER — BLOOD SUGAR DIAGNOSTIC
STRIP MISCELLANEOUS
Qty: 200 STRIP | Refills: 3 | Status: SHIPPED | OUTPATIENT
Start: 2021-03-04 | End: 2022-02-03

## 2021-03-04 NOTE — PROGRESS NOTES
Dx: gait abnormality         Authorized # of Visits:  12         Next MD visit: none scheduled  Fall Risk: standard         Precautions: n/a           Medication Changes since last visit?: No     Subjective: doing ok at home, L wrist feels better, less tig improving wrist motion and decreasing pain. Goals:   to be reached in 12-16 visits. · Increase pain free range of motion of left wrist flexion to 100% of normal limits.     · increase MMT of left ankle, knee and hip to 4/5 to allow patient to safely a

## 2021-03-09 ENCOUNTER — LAB ENCOUNTER (OUTPATIENT)
Dept: LAB | Age: 61
End: 2021-03-09
Attending: FAMILY MEDICINE
Payer: COMMERCIAL

## 2021-03-09 DIAGNOSIS — Z00.00 ANNUAL PHYSICAL EXAM: Primary | ICD-10-CM

## 2021-03-09 LAB
ALBUMIN SERPL-MCNC: 3.6 G/DL (ref 3.4–5)
ALBUMIN/GLOB SERPL: 0.9 {RATIO} (ref 1–2)
ALP LIVER SERPL-CCNC: 65 U/L
ALT SERPL-CCNC: 8 U/L
ANION GAP SERPL CALC-SCNC: 6 MMOL/L (ref 0–18)
AST SERPL-CCNC: 9 U/L (ref 15–37)
BASOPHILS # BLD AUTO: 0.05 X10(3) UL (ref 0–0.2)
BASOPHILS NFR BLD AUTO: 0.7 %
BILIRUB SERPL-MCNC: 0.7 MG/DL (ref 0.1–2)
BUN BLD-MCNC: 13 MG/DL (ref 7–18)
BUN/CREAT SERPL: 15.5 (ref 10–20)
CALCIUM BLD-MCNC: 8.7 MG/DL (ref 8.5–10.1)
CHLORIDE SERPL-SCNC: 110 MMOL/L (ref 98–112)
CHOLEST SMN-MCNC: 148 MG/DL (ref ?–200)
CO2 SERPL-SCNC: 24 MMOL/L (ref 21–32)
CREAT BLD-MCNC: 0.84 MG/DL
CREAT UR-SCNC: 112 MG/DL
DEPRECATED RDW RBC AUTO: 47.9 FL (ref 35.1–46.3)
EOSINOPHIL # BLD AUTO: 0.33 X10(3) UL (ref 0–0.7)
EOSINOPHIL NFR BLD AUTO: 4.3 %
ERYTHROCYTE [DISTWIDTH] IN BLOOD BY AUTOMATED COUNT: 14.6 % (ref 11–15)
EST. AVERAGE GLUCOSE BLD GHB EST-MCNC: 134 MG/DL (ref 68–126)
GLOBULIN PLAS-MCNC: 4.1 G/DL (ref 2.8–4.4)
GLUCOSE BLD-MCNC: 133 MG/DL (ref 70–99)
HBA1C MFR BLD HPLC: 6.3 % (ref ?–5.7)
HCT VFR BLD AUTO: 45.4 %
HDLC SERPL-MCNC: 66 MG/DL (ref 40–59)
HGB BLD-MCNC: 13.8 G/DL
IMM GRANULOCYTES # BLD AUTO: 0.02 X10(3) UL (ref 0–1)
IMM GRANULOCYTES NFR BLD: 0.3 %
LDLC SERPL CALC-MCNC: 70 MG/DL (ref ?–100)
LYMPHOCYTES # BLD AUTO: 1.99 X10(3) UL (ref 1–4)
LYMPHOCYTES NFR BLD AUTO: 26.1 %
M PROTEIN MFR SERPL ELPH: 7.7 G/DL (ref 6.4–8.2)
MCH RBC QN AUTO: 27.2 PG (ref 26–34)
MCHC RBC AUTO-ENTMCNC: 30.4 G/DL (ref 31–37)
MCV RBC AUTO: 89.4 FL
MICROALBUMIN UR-MCNC: 2.34 MG/DL
MICROALBUMIN/CREAT 24H UR-RTO: 20.9 UG/MG (ref ?–30)
MONOCYTES # BLD AUTO: 0.6 X10(3) UL (ref 0.1–1)
MONOCYTES NFR BLD AUTO: 7.9 %
NEUTROPHILS # BLD AUTO: 4.63 X10 (3) UL (ref 1.5–7.7)
NEUTROPHILS # BLD AUTO: 4.63 X10(3) UL (ref 1.5–7.7)
NEUTROPHILS NFR BLD AUTO: 60.7 %
NONHDLC SERPL-MCNC: 82 MG/DL (ref ?–130)
OSMOLALITY SERPL CALC.SUM OF ELEC: 292 MOSM/KG (ref 275–295)
PLATELET # BLD AUTO: 214 10(3)UL (ref 150–450)
POTASSIUM SERPL-SCNC: 4.2 MMOL/L (ref 3.5–5.1)
PSA SERPL-MCNC: 1.18 NG/ML (ref ?–4)
RBC # BLD AUTO: 5.08 X10(6)UL
SODIUM SERPL-SCNC: 140 MMOL/L (ref 136–145)
T4 FREE SERPL-MCNC: 1 NG/DL (ref 0.8–1.7)
TRIGL SERPL-MCNC: 60 MG/DL (ref 30–149)
TSI SER-ACNC: 1.01 MIU/ML (ref 0.36–3.74)
VIT B12 SERPL-MCNC: 837 PG/ML (ref 193–986)
VIT D+METAB SERPL-MCNC: 19.3 NG/ML (ref 30–100)
VLDLC SERPL CALC-MCNC: 12 MG/DL (ref 0–30)
WBC # BLD AUTO: 7.6 X10(3) UL (ref 4–11)

## 2021-03-09 PROCEDURE — 84439 ASSAY OF FREE THYROXINE: CPT | Performed by: FAMILY MEDICINE

## 2021-03-09 PROCEDURE — 80050 GENERAL HEALTH PANEL: CPT | Performed by: FAMILY MEDICINE

## 2021-03-09 PROCEDURE — 82043 UR ALBUMIN QUANTITATIVE: CPT | Performed by: FAMILY MEDICINE

## 2021-03-09 PROCEDURE — 82306 VITAMIN D 25 HYDROXY: CPT | Performed by: FAMILY MEDICINE

## 2021-03-09 PROCEDURE — 3061F NEG MICROALBUMINURIA REV: CPT | Performed by: FAMILY MEDICINE

## 2021-03-09 PROCEDURE — 84153 ASSAY OF PSA TOTAL: CPT | Performed by: FAMILY MEDICINE

## 2021-03-09 PROCEDURE — 36415 COLL VENOUS BLD VENIPUNCTURE: CPT | Performed by: FAMILY MEDICINE

## 2021-03-09 PROCEDURE — 83036 HEMOGLOBIN GLYCOSYLATED A1C: CPT | Performed by: FAMILY MEDICINE

## 2021-03-09 PROCEDURE — 82607 VITAMIN B-12: CPT | Performed by: FAMILY MEDICINE

## 2021-03-09 PROCEDURE — 82570 ASSAY OF URINE CREATININE: CPT | Performed by: FAMILY MEDICINE

## 2021-03-09 PROCEDURE — 80061 LIPID PANEL: CPT | Performed by: FAMILY MEDICINE

## 2021-03-16 ENCOUNTER — TELEPHONE (OUTPATIENT)
Dept: FAMILY MEDICINE CLINIC | Facility: CLINIC | Age: 61
End: 2021-03-16

## 2021-03-16 ENCOUNTER — OFFICE VISIT (OUTPATIENT)
Dept: FAMILY MEDICINE CLINIC | Facility: CLINIC | Age: 61
End: 2021-03-16
Payer: COMMERCIAL

## 2021-03-16 VITALS
DIASTOLIC BLOOD PRESSURE: 56 MMHG | HEART RATE: 68 BPM | HEIGHT: 64 IN | SYSTOLIC BLOOD PRESSURE: 118 MMHG | RESPIRATION RATE: 18 BRPM | BODY MASS INDEX: 37.22 KG/M2 | WEIGHT: 218 LBS | OXYGEN SATURATION: 98 %

## 2021-03-16 DIAGNOSIS — Z86.73 HISTORY OF CVA (CEREBROVASCULAR ACCIDENT): ICD-10-CM

## 2021-03-16 DIAGNOSIS — I10 ESSENTIAL HYPERTENSION: ICD-10-CM

## 2021-03-16 DIAGNOSIS — E78.2 MIXED HYPERLIPIDEMIA: ICD-10-CM

## 2021-03-16 DIAGNOSIS — M20.62 TOE DEFORMITY, ACQUIRED, LEFT: ICD-10-CM

## 2021-03-16 DIAGNOSIS — I69.354 HEMIPARESIS AFFECTING LEFT SIDE AS LATE EFFECT OF STROKE (HCC): ICD-10-CM

## 2021-03-16 DIAGNOSIS — E11.42 DM TYPE 2 WITH DIABETIC PERIPHERAL NEUROPATHY (HCC): Primary | ICD-10-CM

## 2021-03-16 PROCEDURE — 3074F SYST BP LT 130 MM HG: CPT | Performed by: FAMILY MEDICINE

## 2021-03-16 PROCEDURE — 3078F DIAST BP <80 MM HG: CPT | Performed by: FAMILY MEDICINE

## 2021-03-16 PROCEDURE — 99214 OFFICE O/P EST MOD 30 MIN: CPT | Performed by: FAMILY MEDICINE

## 2021-03-16 PROCEDURE — 3008F BODY MASS INDEX DOCD: CPT | Performed by: FAMILY MEDICINE

## 2021-03-16 RX ORDER — CLINDAMYCIN HYDROCHLORIDE 300 MG/1
CAPSULE ORAL
COMMUNITY
Start: 2021-03-15

## 2021-03-16 RX ORDER — IBUPROFEN 800 MG/1
800 TABLET ORAL 3 TIMES DAILY PRN
COMMUNITY
Start: 2021-01-22

## 2021-03-16 RX ORDER — HYDROCODONE BITARTRATE AND ACETAMINOPHEN 5; 300 MG/1; MG/1
TABLET ORAL
COMMUNITY
Start: 2021-03-15

## 2021-03-16 NOTE — PROGRESS NOTES
HPI:   Farhana Romero is a 63year old male who presents for recheck of his diabetes.      Patient’s FBS have been 120's / max 150's  No side effects on the jardiance   HgA1C is better    Pt has lost weight   Pt working out more and eating healthy - 3.740 mIU/mL  1.010   Vitamin B12      193 - 986 pg/mL  837   Vitamin D, 25OH, Total      30.0 - 100.0 ng/mL  19.3 (L)   PSA      <=4.00 ng/mL  1.18     Component      Latest Ref Rng & Units 12/1/2020 12/1/2020 9/1/2020 9/1/2020           9:56 AM  9:56 A HEMOGLOBIN A1c (% of total Hgb)   Date Value   02/14/2019 5.5   11/20/2018 5.6   06/21/2018 5.5     HgbA1C (%)   Date Value   03/09/2021 6.3 (H)   12/01/2020 6.2 (H)   09/01/2020 6.6 (H)     Cholesterol, Total (mg/dL)   Date Value   03/09/2021 14 ONCE A  strip 3   • SIMVASTATIN 40 MG Oral Tab TAKE 1 TABLET BY MOUTH  NIGHTLY. AT BEDTIME 90 tablet 3   • Sildenafil Citrate 100 MG Oral Tab Take 1 tablet (100 mg total) by mouth daily as needed for Erectile Dysfunction.  16 tablet 1   • Butalbital- Obesity, unspecified    • Pain in joint, shoulder region    • Pain in joints March 2010   • Proliferative diabetic retinopathy(362.02)    • Radiculitis, cervical    • Sexual dysfunction    • Shortness of breath March 2010   • Sleep disturbance March 2010 kg/m²    Body mass index is 37.42 kg/m².   GENERAL: well developed, well nourished, in no apparent distress  SKIN: no rashes,no suspicious lesions  NECK: supple,no adenopathy, no thyromegaly  LUNGS: clear to auscultation, easy breathing, no cough  CV: nathen

## 2021-03-20 DIAGNOSIS — Z23 NEED FOR VACCINATION: ICD-10-CM

## 2021-03-29 ENCOUNTER — APPOINTMENT (OUTPATIENT)
Dept: PHYSICAL THERAPY | Age: 61
End: 2021-03-29
Attending: Other
Payer: COMMERCIAL

## 2021-03-31 ENCOUNTER — APPOINTMENT (OUTPATIENT)
Dept: PHYSICAL THERAPY | Age: 61
End: 2021-03-31
Attending: Other
Payer: COMMERCIAL

## 2021-04-06 ENCOUNTER — OFFICE VISIT (OUTPATIENT)
Dept: PHYSICAL THERAPY | Age: 61
End: 2021-04-06
Attending: Other
Payer: COMMERCIAL

## 2021-04-06 PROCEDURE — 97140 MANUAL THERAPY 1/> REGIONS: CPT | Performed by: PHYSICAL THERAPIST

## 2021-04-06 PROCEDURE — 97110 THERAPEUTIC EXERCISES: CPT | Performed by: PHYSICAL THERAPIST

## 2021-04-06 NOTE — PROGRESS NOTES
Dx: gait abnormality         Authorized # of Visits:  12         Next MD visit: none scheduled  Fall Risk: standard         Precautions: n/a           Medication Changes since last visit?: No     Subjective: doing more at home, L wrist feels good, less tig left wrist/hand x 15 min Man mobs/STM to left wrist/hand x 16 min Man mobs/STM to left wrist/hand x 16 min        Sit<>stand x 5 Sit<>stand x 5 Sit<>stand x 5       Assessment: pt progressing well with balance, safety and L UE control.       Goals:   to be

## 2021-04-09 ENCOUNTER — OFFICE VISIT (OUTPATIENT)
Dept: PHYSICAL THERAPY | Age: 61
End: 2021-04-09
Attending: Other
Payer: COMMERCIAL

## 2021-04-09 PROCEDURE — 97110 THERAPEUTIC EXERCISES: CPT | Performed by: PHYSICAL THERAPIST

## 2021-04-09 PROCEDURE — 97140 MANUAL THERAPY 1/> REGIONS: CPT | Performed by: PHYSICAL THERAPIST

## 2021-04-09 NOTE — PROGRESS NOTES
Dx: gait abnormality         Authorized # of Visits:  12         Next MD visit: none scheduled  Fall Risk: standard         Precautions: n/a           Medication Changes since last visit?: No     Subjective: L wrist feels good, feeling a bit tired today. mobs/STM to left wrist/hand x 14 min Man mobs/STM to left wrist/hand x 15 min Man mobs/STM to left wrist/hand x 15 min Man mobs/STM to left wrist/hand x 16 min Man mobs/STM to left wrist/hand x 16 min Man mobs/STM to left wrist/hand x 13 min         Sit<>s

## 2021-04-13 ENCOUNTER — OFFICE VISIT (OUTPATIENT)
Dept: PHYSICAL THERAPY | Age: 61
End: 2021-04-13
Attending: Other
Payer: COMMERCIAL

## 2021-04-13 PROCEDURE — 97110 THERAPEUTIC EXERCISES: CPT | Performed by: PHYSICAL THERAPIST

## 2021-04-13 NOTE — PROGRESS NOTES
Dx: gait abnormality         Authorized # of Visits:  12         Next MD visit: none scheduled  Fall Risk: standard         Precautions: n/a           Medication Changes since last visit?: No     Subjective: L wrist feels good, feeling pretty good today, l rows single arm 15# 3x10 ea  Prone hip ext x 10 ea     Supine bridging 3x10      GTB lat pull 3x10 Clam shell x 20     Man mobs to left MTP joints x 12 min Man mobs/STM to left wrist/hand x 14 min Man mobs/STM to left wrist/hand x 15 min Man mobs/STM to le

## 2021-04-21 ENCOUNTER — OFFICE VISIT (OUTPATIENT)
Dept: PHYSICAL THERAPY | Age: 61
End: 2021-04-21
Attending: Other
Payer: COMMERCIAL

## 2021-04-21 PROCEDURE — 97110 THERAPEUTIC EXERCISES: CPT | Performed by: PHYSICAL THERAPIST

## 2021-04-21 NOTE — PROGRESS NOTES
Dx: gait abnormality         Authorized # of Visits:  12         Next MD visit: none scheduled  Fall Risk: standard         Precautions: n/a           Medication Changes since last visit?: No     Subjective: L wrist feels good, feeling pretty good today, l 25# x 10 Cable pulley retro walk 25# x 10 Cable pulley retro walk 25# x 10  PKB x 20 ea    rows B arms 25# 5x10     rows B arms 25# 4x10 rows B arms 25# 4x10 rows single arm 15# 3x10 ea  Prone hip ext x 10 ea      Supine bridging 3x10      GTB lat pull 3x1

## 2021-06-19 ENCOUNTER — LAB ENCOUNTER (OUTPATIENT)
Dept: LAB | Age: 61
End: 2021-06-19
Attending: FAMILY MEDICINE
Payer: COMMERCIAL

## 2021-06-19 PROCEDURE — 83036 HEMOGLOBIN GLYCOSYLATED A1C: CPT | Performed by: FAMILY MEDICINE

## 2021-06-19 PROCEDURE — 80053 COMPREHEN METABOLIC PANEL: CPT | Performed by: FAMILY MEDICINE

## 2021-06-19 PROCEDURE — 80061 LIPID PANEL: CPT | Performed by: FAMILY MEDICINE

## 2021-06-22 ENCOUNTER — OFFICE VISIT (OUTPATIENT)
Dept: FAMILY MEDICINE CLINIC | Facility: CLINIC | Age: 61
End: 2021-06-22
Payer: COMMERCIAL

## 2021-06-22 VITALS
SYSTOLIC BLOOD PRESSURE: 112 MMHG | BODY MASS INDEX: 36.88 KG/M2 | RESPIRATION RATE: 16 BRPM | HEART RATE: 72 BPM | HEIGHT: 64 IN | WEIGHT: 216 LBS | DIASTOLIC BLOOD PRESSURE: 56 MMHG | OXYGEN SATURATION: 97 %

## 2021-06-22 DIAGNOSIS — E78.2 MIXED HYPERLIPIDEMIA: ICD-10-CM

## 2021-06-22 DIAGNOSIS — Z86.73 HISTORY OF CVA (CEREBROVASCULAR ACCIDENT): ICD-10-CM

## 2021-06-22 DIAGNOSIS — I69.354 HEMIPARESIS AFFECTING LEFT SIDE AS LATE EFFECT OF STROKE (HCC): ICD-10-CM

## 2021-06-22 DIAGNOSIS — I10 ESSENTIAL HYPERTENSION: ICD-10-CM

## 2021-06-22 DIAGNOSIS — E11.42 DM TYPE 2 WITH DIABETIC PERIPHERAL NEUROPATHY (HCC): Primary | ICD-10-CM

## 2021-06-22 PROCEDURE — 3008F BODY MASS INDEX DOCD: CPT | Performed by: FAMILY MEDICINE

## 2021-06-22 PROCEDURE — 90750 HZV VACC RECOMBINANT IM: CPT | Performed by: FAMILY MEDICINE

## 2021-06-22 PROCEDURE — 90471 IMMUNIZATION ADMIN: CPT | Performed by: FAMILY MEDICINE

## 2021-06-22 PROCEDURE — 99214 OFFICE O/P EST MOD 30 MIN: CPT | Performed by: FAMILY MEDICINE

## 2021-06-22 PROCEDURE — 3078F DIAST BP <80 MM HG: CPT | Performed by: FAMILY MEDICINE

## 2021-06-22 PROCEDURE — 3074F SYST BP LT 130 MM HG: CPT | Performed by: FAMILY MEDICINE

## 2021-06-22 NOTE — PROGRESS NOTES
HPI:   Sunita Wolfe is a 63year old male who presents for recheck of his diabetes. Patient’s FBS have been 120's / max 150's - varies when pt eats   Patient denies chest pain, shortness of breath, dizziness, and lightheadedness.  No exertion Component      Latest Ref Rng & Units 3/9/2021 12/1/2020 12/1/2020          10:55 AM  9:56 AM  9:56 AM   Glucose      70 - 99 mg/dL  138 (H)    Sodium      136 - 145 mmol/L  145    Potassium      3.5 - 5.1 mmol/L  4.0    Chloride      98 - 112 mmol/L 106    eGFR       >=60 122    AST (SGOT)      15 - 37 U/L 11 (L)    ALT (SGPT)      16 - 61 U/L 12 (L)    ALKALINE PHOSPHATASE      45 - 117 U/L 58    Total Bilirubin      0.1 - 2.0 mg/dL 0.5    TOTAL PROTEIN      6.4 - 8.2 g/dL 7.0    Albu >300 ug/mg creatinine      Albuminuria       03/07/2020 23.4 <=30.0 ug/mg Final     Comment:     <30 ug/mg creatinine       Normal     ug/mg creatinine   Microalbuminuria   >300 ug/mg creatinine      Albuminuria           Current Outpatient Medicat 2012   • Capsulitis    • Cataract    • Cerebral embolism with cerebral infarction [facility])    • Corns and callosities    • CVA (cerebral vascular accident) ([facility]    • Depression    • Depressive disorder, not elsewhere classified    • Diabetes ([facility]    • Diabe standard drinks      Comment: 1 or 2 glasses of beer or wine per month, social    Drug use: No  pt reports he lives with his wife  On disability after CVA  2 children   Exercise: three times per week.   Diet: watches sugar closely     REVIEW OF SYSTEMS:   RAMON COMP METABOLIC PANEL (14)    4. Essential hypertension    - LIPID PANEL  - COMP METABOLIC PANEL (14)    5. Mixed hyperlipidemia    - LIPID PANEL  - COMP METABOLIC PANEL (14)      The patient indicates understanding of these issues and agrees to the plan.

## 2021-07-03 DIAGNOSIS — E11.42 DM TYPE 2 WITH DIABETIC PERIPHERAL NEUROPATHY (HCC): ICD-10-CM

## 2021-07-03 DIAGNOSIS — I69.354 HEMIPARESIS AFFECTING LEFT SIDE AS LATE EFFECT OF STROKE (HCC): ICD-10-CM

## 2021-07-06 RX ORDER — SIMVASTATIN 40 MG
TABLET ORAL
Qty: 90 TABLET | Refills: 3 | Status: SHIPPED | OUTPATIENT
Start: 2021-07-06

## 2021-07-20 DIAGNOSIS — I10 ESSENTIAL HYPERTENSION: ICD-10-CM

## 2021-07-21 RX ORDER — LISINOPRIL 40 MG/1
TABLET ORAL
Qty: 90 TABLET | Refills: 3 | Status: SHIPPED | OUTPATIENT
Start: 2021-07-21

## 2021-07-21 RX ORDER — AMLODIPINE BESYLATE 10 MG/1
TABLET ORAL
Qty: 90 TABLET | Refills: 3 | Status: SHIPPED | OUTPATIENT
Start: 2021-07-21

## 2021-07-21 NOTE — TELEPHONE ENCOUNTER
Rx Request  AMLODIPINE BESYLATE 10 MG Oral Tab    LISINOPRIL 40 MG Oral Tab    Disp:        90            R: 3    Associated Dx: Essential Hypertension    Last Refilled:08/30/2020    Last Visit: 06/22/2021    Protocol Passed?  Yes[ x ]       No[  ] [General Appearance - Well Developed] : well developed [Normal Appearance] : normal appearance [Well Groomed] : well groomed [General Appearance - Well Nourished] : well nourished [No Deformities] : no deformities [General Appearance - In No Acute Distress] : no acute distress [Normal Oral Mucosa] : normal oral mucosa [No Oral Pallor] : no oral pallor [No Oral Cyanosis] : no oral cyanosis [Respiration, Rhythm And Depth] : normal respiratory rhythm and effort [Exaggerated Use Of Accessory Muscles For Inspiration] : no accessory muscle use [Auscultation Breath Sounds / Voice Sounds] : lungs were clear to auscultation bilaterally [Heart Rate And Rhythm] : heart rate and rhythm were normal [Heart Sounds] : normal S1 and S2 [Murmurs] : no murmurs present [Abdomen Soft] : soft [Abdomen Tenderness] : non-tender [Abdomen Mass (___ Cm)] : no abdominal mass palpated [Abnormal Walk] : normal gait [Gait - Sufficient For Exercise Testing] : the gait was sufficient for exercise testing [Nail Clubbing] : no clubbing of the fingernails [Cyanosis, Localized] : no localized cyanosis [Petechial Hemorrhages (___cm)] : no petechial hemorrhages [] : no ischemic changes [Oriented To Time, Place, And Person] : oriented to person, place, and time [Affect] : the affect was normal [Mood] : the mood was normal [No Anxiety] : not feeling anxious [FreeTextEntry1] : Edema - 2+ pitting

## 2021-09-01 ENCOUNTER — ORDER TRANSCRIPTION (OUTPATIENT)
Dept: ADMINISTRATIVE | Facility: HOSPITAL | Age: 61
End: 2021-09-01

## 2021-09-01 DIAGNOSIS — Z13.6 SCREENING FOR CARDIOVASCULAR CONDITION: Primary | ICD-10-CM

## 2021-09-07 DIAGNOSIS — E11.42 DM TYPE 2 WITH DIABETIC PERIPHERAL NEUROPATHY (HCC): ICD-10-CM

## 2021-09-16 RX ORDER — EMPAGLIFLOZIN 10 MG/1
TABLET, FILM COATED ORAL
Qty: 90 TABLET | Refills: 3 | Status: SHIPPED | OUTPATIENT
Start: 2021-09-16

## 2021-09-16 NOTE — TELEPHONE ENCOUNTER
Next Appt:    With [address] [place] [address] Robert Kim DO)  09/23/2021 at 11:00 AM     6-22-21    LR 12-8-20

## 2021-09-21 ENCOUNTER — HOSPITAL ENCOUNTER (OUTPATIENT)
Dept: CT IMAGING | Age: 61
Discharge: HOME | End: 2021-09-21
Attending: FAMILY MEDICINE

## 2021-09-21 ENCOUNTER — LAB ENCOUNTER (OUTPATIENT)
Dept: LAB | Age: 61
End: 2021-09-21
Attending: FAMILY MEDICINE
Payer: COMMERCIAL

## 2021-09-21 ENCOUNTER — APPOINTMENT (OUTPATIENT)
Dept: LAB | Age: 61
End: 2021-09-21
Attending: FAMILY MEDICINE

## 2021-09-21 DIAGNOSIS — Z13.6 SCREENING FOR CARDIOVASCULAR CONDITION: ICD-10-CM

## 2021-09-21 LAB
ALBUMIN SERPL-MCNC: 3.6 G/DL (ref 3.4–5)
ALBUMIN/GLOB SERPL: 1 {RATIO} (ref 1–2)
ALP LIVER SERPL-CCNC: 67 U/L
ALT SERPL-CCNC: 10 U/L
ANION GAP SERPL CALC-SCNC: 5 MMOL/L (ref 0–18)
AST SERPL-CCNC: 7 U/L (ref 15–37)
BILIRUB SERPL-MCNC: 0.7 MG/DL (ref 0.1–2)
BUN BLD-MCNC: 15 MG/DL (ref 7–18)
CALCIUM BLD-MCNC: 8.9 MG/DL (ref 8.5–10.1)
CHLORIDE SERPL-SCNC: 109 MMOL/L (ref 98–112)
CHOLEST SERPL-MCNC: 149 MG/DL (ref ?–200)
CO2 SERPL-SCNC: 26 MMOL/L (ref 21–32)
CREAT BLD-MCNC: 0.84 MG/DL
EST. AVERAGE GLUCOSE BLD GHB EST-MCNC: 134 MG/DL (ref 68–126)
GLOBULIN PLAS-MCNC: 3.7 G/DL (ref 2.8–4.4)
GLUCOSE BLD-MCNC: 117 MG/DL (ref 70–99)
HBA1C MFR BLD HPLC: 6.3 % (ref ?–5.7)
HDLC SERPL-MCNC: 59 MG/DL (ref 40–59)
LDLC SERPL CALC-MCNC: 78 MG/DL (ref ?–100)
NONHDLC SERPL-MCNC: 90 MG/DL (ref ?–130)
OSMOLALITY SERPL CALC.SUM OF ELEC: 292 MOSM/KG (ref 275–295)
PATIENT FASTING Y/N/NP: YES
PATIENT FASTING Y/N/NP: YES
POTASSIUM SERPL-SCNC: 4.1 MMOL/L (ref 3.5–5.1)
PROT SERPL-MCNC: 7.3 G/DL (ref 6.4–8.2)
SODIUM SERPL-SCNC: 140 MMOL/L (ref 136–145)
TRIGL SERPL-MCNC: 55 MG/DL (ref 30–149)
VLDLC SERPL CALC-MCNC: 9 MG/DL (ref 0–30)

## 2021-09-21 PROCEDURE — 3044F HG A1C LEVEL LT 7.0%: CPT | Performed by: FAMILY MEDICINE

## 2021-09-21 PROCEDURE — 83036 HEMOGLOBIN GLYCOSYLATED A1C: CPT | Performed by: FAMILY MEDICINE

## 2021-09-21 PROCEDURE — 80053 COMPREHEN METABOLIC PANEL: CPT | Performed by: FAMILY MEDICINE

## 2021-09-21 PROCEDURE — 80061 LIPID PANEL: CPT | Performed by: FAMILY MEDICINE

## 2021-09-23 ENCOUNTER — EKG ENCOUNTER (OUTPATIENT)
Dept: LAB | Age: 61
End: 2021-09-23
Attending: FAMILY MEDICINE
Payer: COMMERCIAL

## 2021-09-23 ENCOUNTER — OFFICE VISIT (OUTPATIENT)
Dept: FAMILY MEDICINE CLINIC | Facility: CLINIC | Age: 61
End: 2021-09-23
Payer: COMMERCIAL

## 2021-09-23 VITALS
OXYGEN SATURATION: 97 % | SYSTOLIC BLOOD PRESSURE: 132 MMHG | DIASTOLIC BLOOD PRESSURE: 64 MMHG | BODY MASS INDEX: 37.39 KG/M2 | HEIGHT: 64 IN | RESPIRATION RATE: 16 BRPM | HEART RATE: 65 BPM | WEIGHT: 219 LBS

## 2021-09-23 DIAGNOSIS — E11.42 DM TYPE 2 WITH DIABETIC PERIPHERAL NEUROPATHY (HCC): Primary | ICD-10-CM

## 2021-09-23 DIAGNOSIS — R93.1 ELEVATED CORONARY ARTERY CALCIUM SCORE: ICD-10-CM

## 2021-09-23 DIAGNOSIS — Z86.73 HISTORY OF CVA (CEREBROVASCULAR ACCIDENT): ICD-10-CM

## 2021-09-23 DIAGNOSIS — I10 ESSENTIAL HYPERTENSION: ICD-10-CM

## 2021-09-23 DIAGNOSIS — Z23 NEED FOR VACCINATION: ICD-10-CM

## 2021-09-23 DIAGNOSIS — E11.42 DM TYPE 2 WITH DIABETIC PERIPHERAL NEUROPATHY (HCC): ICD-10-CM

## 2021-09-23 PROCEDURE — 90471 IMMUNIZATION ADMIN: CPT | Performed by: FAMILY MEDICINE

## 2021-09-23 PROCEDURE — 3075F SYST BP GE 130 - 139MM HG: CPT | Performed by: FAMILY MEDICINE

## 2021-09-23 PROCEDURE — 93010 ELECTROCARDIOGRAM REPORT: CPT | Performed by: INTERNAL MEDICINE

## 2021-09-23 PROCEDURE — 90686 IIV4 VACC NO PRSV 0.5 ML IM: CPT | Performed by: FAMILY MEDICINE

## 2021-09-23 PROCEDURE — 99214 OFFICE O/P EST MOD 30 MIN: CPT | Performed by: FAMILY MEDICINE

## 2021-09-23 PROCEDURE — 93005 ELECTROCARDIOGRAM TRACING: CPT

## 2021-09-23 PROCEDURE — 3078F DIAST BP <80 MM HG: CPT | Performed by: FAMILY MEDICINE

## 2021-09-23 PROCEDURE — 3008F BODY MASS INDEX DOCD: CPT | Performed by: FAMILY MEDICINE

## 2021-09-23 NOTE — PROGRESS NOTES
HPI:   Dagmar Ramirez is a 63year old male who presents for recheck of his diabetes. Patient’s FBS have been 110's / max 130's - varies when pt eats   Patient denies chest pain, shortness of breath, dizziness, and lightheadedness.  No exertion LDL Cholesterol Calc      <100 mg/dL  78 57    VLDL      0 - 30 mg/dL  9 8    NON HDL CHOL      <130 mg/dL  90 69    HEMOGLOBIN A1c      <5.7 %  6.3 (H)  6.4 (H)   ESTIMATED AVERAGE GLUCOSE      68 - 126 mg/dL  134 (H)  137 (H)     Component      Latest BUN/CREAT Ratio      10.0 - 20.0 13.1    CALCIUM      8.5 - 10.1 mg/dL 8.4 (L)    CALCULATED OSMOLALITY      275 - 295 mOsm/kg 302 (H)    eGFR NON-AFR.  AMERICAN      >=60 82    eGFR       >=60 95    AST (SGOT)      15 - 37 U/L 12 (L)    A REGULAR TOOTHPASTE TID FOR 2 MINUTES.  3   • Acetaminophen-Codeine #3 300-30 MG Oral Tab Take 1-2 tablets by mouth every 6 (six) hours as needed for Pain. 30 tablet 0   • Chlorhexidine Gluconate 0.12 % Mouth/Throat Solution as needed.        • Aspirin 325 M manifestations, not stated as uncontrolled(250.50)    • Uncomfortable fullness after meals March 2010   • Unspecified cerebral artery occlusion with cerebral infarction    • Vitreous hemorrhage [facility])    • Wears glasses January 1998   • Weight loss March 20 is intact to monofilament bilaterally   PSYCH: judgement and insight are appropriate & intact      ASSESSMENT AND PLAN:   Shasta Aparicio is a 63year old male who presents for a recheck of his diabetes.    Discussed importance of medication, diet a

## 2021-09-26 LAB
ATRIAL RATE: 61 BPM
P AXIS: 63 DEGREES
P-R INTERVAL: 154 MS
Q-T INTERVAL: 410 MS
QRS DURATION: 96 MS
QTC CALCULATION (BEZET): 412 MS
R AXIS: 10 DEGREES
T AXIS: 21 DEGREES
VENTRICULAR RATE: 61 BPM

## 2021-09-30 ENCOUNTER — TELEPHONE (OUTPATIENT)
Dept: FAMILY MEDICINE CLINIC | Facility: CLINIC | Age: 61
End: 2021-09-30

## 2021-09-30 NOTE — TELEPHONE ENCOUNTER
[facility] Clinical Staff  Good Afternoon,   The following request has been authorized. Patient was contacted to notify, with no answer and unable to leave a voicemail. Please advise   Thank you,   Armando Letters with patient to notify him of Sarabjit Nesha authorization:   He states he is having it on 10/15 Dr. Dayana Thompson office. EDIS zavala.

## 2021-10-12 ENCOUNTER — IMMUNIZATION (OUTPATIENT)
Dept: LAB | Facility: HOSPITAL | Age: 61
End: 2021-10-12
Attending: EMERGENCY MEDICINE
Payer: COMMERCIAL

## 2021-10-12 DIAGNOSIS — Z23 NEED FOR VACCINATION: Primary | ICD-10-CM

## 2021-10-12 PROCEDURE — 0003A SARSCOV2 VAC 30MCG/0.3ML IM: CPT

## 2021-11-11 ENCOUNTER — OFFICE VISIT (OUTPATIENT)
Dept: NEUROLOGY | Facility: CLINIC | Age: 61
End: 2021-11-11
Payer: COMMERCIAL

## 2021-11-11 VITALS
RESPIRATION RATE: 18 BRPM | SYSTOLIC BLOOD PRESSURE: 128 MMHG | BODY MASS INDEX: 39 KG/M2 | HEART RATE: 70 BPM | WEIGHT: 228 LBS | DIASTOLIC BLOOD PRESSURE: 72 MMHG

## 2021-11-11 DIAGNOSIS — I69.354 HEMIPARESIS AFFECTING LEFT SIDE AS LATE EFFECT OF STROKE (HCC): ICD-10-CM

## 2021-11-11 DIAGNOSIS — M31.5 POLYMYALGIA ARTERITICA (HCC): ICD-10-CM

## 2021-11-11 DIAGNOSIS — G44.019 EPISODIC CLUSTER HEADACHE, NOT INTRACTABLE: ICD-10-CM

## 2021-11-11 DIAGNOSIS — R26.9 GAIT ABNORMALITY: Primary | ICD-10-CM

## 2021-11-11 DIAGNOSIS — I63.10 CEREBRAL INFARCTION DUE TO EMBOLISM OF PRECEREBRAL ARTERY (HCC): ICD-10-CM

## 2021-11-11 PROBLEM — R63.5 WEIGHT GAIN: Status: ACTIVE | Noted: 2021-11-11

## 2021-11-11 PROCEDURE — 99214 OFFICE O/P EST MOD 30 MIN: CPT | Performed by: OTHER

## 2021-11-11 PROCEDURE — 3078F DIAST BP <80 MM HG: CPT | Performed by: OTHER

## 2021-11-11 PROCEDURE — 3074F SYST BP LT 130 MM HG: CPT | Performed by: OTHER

## 2021-11-11 NOTE — PROGRESS NOTES
[facility] [place] with [facility]  Neurology - Clinic Follow up  2021    Aidan Newton Patient Status:  No patient class for patient encounter    11/15/1960 MRN [id number] March 2010   • Feeling lonely March 2010   • Flatulence/gas pain/belching Since procedure, has lessened slightly.    • Frequent urination March 2010   • Headache March 2010   • Headache(784.0)    • Hyperlipidemia    • Hypertension    • Leg swelling March 20 MOUTH ONCE DAILY 90 tablet 3   • SIMVASTATIN 40 MG Oral Tab TAKE 1 TABLET BY MOUTH  NIGHTLY AT BEDTIME 90 tablet 3   • Clindamycin HCl 300 MG Oral Cap      • HYDROcodone-acetaminophen 5-300 MG Oral Tab      • ibuprofen 800 MG Oral Tab Take 800 mg by mouth from old stroke, he has also weakness in his L.  HF 4/5, he is better in HF weakness, L. FE 4/5,   Sensory: decreased pain in his L.  Side arm and leg from stroke,   DTRs   Symmetric 2/4 in all limbs,   No Babinski sign,   COORDINATION: Normal FTN and HTS t

## 2021-11-11 NOTE — PROGRESS NOTES
Patient states no changes in balance, patient states a couple falls. No changes in headaches, states at least one headache a week.

## 2021-12-13 ENCOUNTER — LAB ENCOUNTER (OUTPATIENT)
Dept: LAB | Age: 61
End: 2021-12-13
Attending: FAMILY MEDICINE
Payer: COMMERCIAL

## 2021-12-13 DIAGNOSIS — I10 ESSENTIAL HYPERTENSION: ICD-10-CM

## 2021-12-13 DIAGNOSIS — Z86.73 HISTORY OF CVA (CEREBROVASCULAR ACCIDENT): ICD-10-CM

## 2021-12-13 DIAGNOSIS — R93.1 ELEVATED CORONARY ARTERY CALCIUM SCORE: ICD-10-CM

## 2021-12-13 DIAGNOSIS — E11.42 DM TYPE 2 WITH DIABETIC PERIPHERAL NEUROPATHY (HCC): ICD-10-CM

## 2021-12-13 PROCEDURE — 80053 COMPREHEN METABOLIC PANEL: CPT

## 2021-12-13 PROCEDURE — 83036 HEMOGLOBIN GLYCOSYLATED A1C: CPT

## 2021-12-13 PROCEDURE — 3044F HG A1C LEVEL LT 7.0%: CPT | Performed by: FAMILY MEDICINE

## 2021-12-13 PROCEDURE — 80061 LIPID PANEL: CPT

## 2021-12-15 ENCOUNTER — OFFICE VISIT (OUTPATIENT)
Dept: FAMILY MEDICINE CLINIC | Facility: CLINIC | Age: 61
End: 2021-12-15
Payer: COMMERCIAL

## 2021-12-15 VITALS
HEART RATE: 80 BPM | DIASTOLIC BLOOD PRESSURE: 72 MMHG | SYSTOLIC BLOOD PRESSURE: 122 MMHG | WEIGHT: 226 LBS | RESPIRATION RATE: 16 BRPM | BODY MASS INDEX: 38.58 KG/M2 | HEIGHT: 64 IN | OXYGEN SATURATION: 98 %

## 2021-12-15 DIAGNOSIS — I10 ESSENTIAL HYPERTENSION: ICD-10-CM

## 2021-12-15 DIAGNOSIS — I69.354 HEMIPARESIS AFFECTING LEFT SIDE AS LATE EFFECT OF STROKE (HCC): ICD-10-CM

## 2021-12-15 DIAGNOSIS — R93.1 ELEVATED CORONARY ARTERY CALCIUM SCORE: ICD-10-CM

## 2021-12-15 DIAGNOSIS — E11.42 DM TYPE 2 WITH DIABETIC PERIPHERAL NEUROPATHY (HCC): Primary | ICD-10-CM

## 2021-12-15 DIAGNOSIS — Z86.73 HISTORY OF CVA (CEREBROVASCULAR ACCIDENT): ICD-10-CM

## 2021-12-15 DIAGNOSIS — Z00.00 GENERAL MEDICAL EXAM: ICD-10-CM

## 2021-12-15 PROCEDURE — 3078F DIAST BP <80 MM HG: CPT | Performed by: FAMILY MEDICINE

## 2021-12-15 PROCEDURE — 3074F SYST BP LT 130 MM HG: CPT | Performed by: FAMILY MEDICINE

## 2021-12-15 PROCEDURE — 90471 IMMUNIZATION ADMIN: CPT | Performed by: FAMILY MEDICINE

## 2021-12-15 PROCEDURE — 99214 OFFICE O/P EST MOD 30 MIN: CPT | Performed by: FAMILY MEDICINE

## 2021-12-15 PROCEDURE — 90715 TDAP VACCINE 7 YRS/> IM: CPT | Performed by: FAMILY MEDICINE

## 2021-12-15 PROCEDURE — 3008F BODY MASS INDEX DOCD: CPT | Performed by: FAMILY MEDICINE

## 2021-12-15 NOTE — PROGRESS NOTES
HPI:   Farhana Romero is a 57year old male who presents for recheck of his diabetes. Patient’s FBS have been 110's / max 130's - varies when pt eats   Patient denies chest pain, shortness of breath, dizziness, and lightheadedness.    No exerti Calc      <100 mg/dL   78   VLDL      0 - 30 mg/dL   9   NON-HDL CHOLESTEROL      <130 mg/dL   90   HEMOGLOBIN A1c      <5.7 % 6.3 (H)  6.3 (H)   ESTIMATED AVERAGE GLUCOSE      68 - 126 mg/dL 134 (H)  134 (H)     Component      Latest Ref Rng & Units 6/19/ BUN      7 - 18 mg/dL    CREATININE      0.70 - 1.30 mg/dL    BUN/CREATININE RATIO      10.0 - 20.0    CALCIUM      8.5 - 10.1 mg/dL    CALCULATED OSMOLALITY      275 - 295 mOsm/kg    eGFR NON-AFR.  AMERICAN      >=60    eGFR       >=60 300-30 MG Oral Tab Take 1-2 tablets by mouth every 6 (six) hours as needed for Pain. 30 tablet 0   • Chlorhexidine Gluconate 0.12 % Mouth/Throat Solution as needed. • Aspirin 325 MG Oral Tab Take 1 Tab by mouth daily.         Past Medical History:   D • Vitreous hemorrhage [facility])    • Wears glasses January 1998   • Weight loss March 2010      Past Surgical History:   Procedure Laterality Date   • COLONOSCOPY  April 2018   • LASER SURGERY OF EYE  9/2010   • OTHER SURGICAL HISTORY      URETHRAL DILATION who presents for a recheck of his diabetes. Discussed importance of medication, diet adherence, skin care and routine exercise. Reviewed good diabetic foot care. Annual dilated eye exams reinforced.  Routine accuchecks and diabetic labwork as discusse

## 2021-12-21 ENCOUNTER — TELEPHONE (OUTPATIENT)
Dept: FAMILY MEDICINE CLINIC | Facility: CLINIC | Age: 61
End: 2021-12-21

## 2021-12-21 NOTE — TELEPHONE ENCOUNTER
SURG 2/9/22    [facility]    APPT 1/17    FAX IN LE TRIAGE

## 2022-01-17 ENCOUNTER — OFFICE VISIT (OUTPATIENT)
Dept: FAMILY MEDICINE CLINIC | Facility: CLINIC | Age: 62
End: 2022-01-17
Payer: COMMERCIAL

## 2022-01-17 VITALS
HEIGHT: 64 IN | HEART RATE: 69 BPM | WEIGHT: 225 LBS | DIASTOLIC BLOOD PRESSURE: 56 MMHG | SYSTOLIC BLOOD PRESSURE: 114 MMHG | OXYGEN SATURATION: 98 % | BODY MASS INDEX: 38.41 KG/M2 | RESPIRATION RATE: 16 BRPM

## 2022-01-17 DIAGNOSIS — Z01.818 PREOP EXAMINATION: Primary | ICD-10-CM

## 2022-01-17 DIAGNOSIS — Z86.73 HISTORY OF CVA (CEREBROVASCULAR ACCIDENT): ICD-10-CM

## 2022-01-17 DIAGNOSIS — I69.354 HEMIPARESIS AFFECTING LEFT SIDE AS LATE EFFECT OF STROKE (HCC): ICD-10-CM

## 2022-01-17 DIAGNOSIS — R93.1 ELEVATED CORONARY ARTERY CALCIUM SCORE: ICD-10-CM

## 2022-01-17 DIAGNOSIS — E11.42 DM TYPE 2 WITH DIABETIC PERIPHERAL NEUROPATHY (HCC): ICD-10-CM

## 2022-01-17 DIAGNOSIS — I10 ESSENTIAL HYPERTENSION: ICD-10-CM

## 2022-01-17 DIAGNOSIS — H25.9 SENILE CATARACT OF RIGHT EYE, UNSPECIFIED AGE-RELATED CATARACT TYPE: ICD-10-CM

## 2022-01-17 PROCEDURE — 3078F DIAST BP <80 MM HG: CPT | Performed by: FAMILY MEDICINE

## 2022-01-17 PROCEDURE — 3008F BODY MASS INDEX DOCD: CPT | Performed by: FAMILY MEDICINE

## 2022-01-17 PROCEDURE — 99243 OFF/OP CNSLTJ NEW/EST LOW 30: CPT | Performed by: FAMILY MEDICINE

## 2022-01-17 PROCEDURE — 3074F SYST BP LT 130 MM HG: CPT | Performed by: FAMILY MEDICINE

## 2022-01-17 NOTE — PROGRESS NOTES
April Erwin is a 57year old male who presents for preop clearance for right eye cataract   Dr. Brian Collins reqesting clearance. 2/9/2022  [facility]   HPI:   Pt complains of blurry vision .   Pt denies any chest pain/sob/dizziness or BEDTIME 90 tablet 3   • Clindamycin HCl 300 MG Oral Cap      • HYDROcodone-acetaminophen 5-300 MG Oral Tab      • ibuprofen 800 MG Oral Tab Take 800 mg by mouth 3 (three) times daily as needed.      • CONTOUR NEXT TEST In Vitro Strip USE WITH METER TO CHECK retinopathy(362.02)    • Radiculitis, cervical    • Sexual dysfunction    • Shortness of breath March 2010   • Sleep disturbance March 2010   • Stool incontinence March 2017   • Stroke [facility])    • Syncope and collapse    • Tendonitis    • Tension headache exertion  CARDIOVASCULAR: denies chest pain on exertion  GI: denies abdominal pain,denies heartburn  : denies nocturia or changes in stream  MUSCULOSKELETAL: denies back pain  NEURO: denies headaches  PSYCHE: denies depression or anxiety  HEMATOLOGIC: de

## 2022-02-03 RX ORDER — BLOOD SUGAR DIAGNOSTIC
STRIP MISCELLANEOUS
Qty: 200 STRIP | Refills: 3 | Status: SHIPPED | OUTPATIENT
Start: 2022-02-03

## 2022-02-06 ENCOUNTER — LAB ENCOUNTER (OUTPATIENT)
Dept: LAB | Facility: HOSPITAL | Age: 62
End: 2022-02-06
Attending: OPHTHALMOLOGY
Payer: COMMERCIAL

## 2022-02-06 DIAGNOSIS — Z01.818 PRE-PROCEDURAL EXAMINATION: ICD-10-CM

## 2022-02-07 LAB — SARS-COV-2 RNA RESP QL NAA+PROBE: NOT DETECTED

## 2022-03-09 ENCOUNTER — LAB ENCOUNTER (OUTPATIENT)
Dept: LAB | Age: 62
End: 2022-03-09
Attending: FAMILY MEDICINE
Payer: COMMERCIAL

## 2022-03-09 DIAGNOSIS — Z00.00 ROUTINE GENERAL MEDICAL EXAMINATION AT A HEALTH CARE FACILITY: Primary | ICD-10-CM

## 2022-03-09 LAB
ALBUMIN SERPL-MCNC: 3.8 G/DL (ref 3.4–5)
ALBUMIN/GLOB SERPL: 1.2 {RATIO} (ref 1–2)
ALP LIVER SERPL-CCNC: 65 U/L
ALT SERPL-CCNC: 9 U/L
ANION GAP SERPL CALC-SCNC: 3 MMOL/L (ref 0–18)
AST SERPL-CCNC: 9 U/L (ref 15–37)
BASOPHILS # BLD AUTO: 0.07 X10(3) UL (ref 0–0.2)
BASOPHILS NFR BLD AUTO: 0.8 %
BILIRUB SERPL-MCNC: 0.6 MG/DL (ref 0.1–2)
BUN BLD-MCNC: 9 MG/DL (ref 7–18)
CALCIUM BLD-MCNC: 8.5 MG/DL (ref 8.5–10.1)
CHLORIDE SERPL-SCNC: 112 MMOL/L (ref 98–112)
CHOLEST SERPL-MCNC: 138 MG/DL (ref ?–200)
CO2 SERPL-SCNC: 27 MMOL/L (ref 21–32)
CREAT UR-SCNC: 146 MG/DL
EOSINOPHIL # BLD AUTO: 0.35 X10(3) UL (ref 0–0.7)
EOSINOPHIL NFR BLD AUTO: 4.1 %
ERYTHROCYTE [DISTWIDTH] IN BLOOD BY AUTOMATED COUNT: 13.6 %
EST. AVERAGE GLUCOSE BLD GHB EST-MCNC: 131 MG/DL (ref 68–126)
FASTING PATIENT LIPID ANSWER: NO
GLOBULIN PLAS-MCNC: 3.2 G/DL (ref 2.8–4.4)
GLUCOSE BLD-MCNC: 82 MG/DL (ref 70–99)
HBA1C MFR BLD: 6.2 % (ref ?–5.7)
HCT VFR BLD AUTO: 45.6 %
HDLC SERPL-MCNC: 68 MG/DL (ref 40–59)
HGB BLD-MCNC: 13.7 G/DL
IMM GRANULOCYTES # BLD AUTO: 0.04 X10(3) UL (ref 0–1)
IMM GRANULOCYTES NFR BLD: 0.5 %
LDLC SERPL CALC-MCNC: 54 MG/DL (ref ?–100)
LYMPHOCYTES # BLD AUTO: 2.55 X10(3) UL (ref 1–4)
LYMPHOCYTES NFR BLD AUTO: 30.2 %
MCH RBC QN AUTO: 27.5 PG (ref 26–34)
MCHC RBC AUTO-ENTMCNC: 30 G/DL (ref 31–37)
MCV RBC AUTO: 91.4 FL
MICROALBUMIN UR-MCNC: 8.75 MG/DL
MICROALBUMIN/CREAT 24H UR-RTO: 59.9 UG/MG (ref ?–30)
MONOCYTES # BLD AUTO: 0.73 X10(3) UL (ref 0.1–1)
MONOCYTES NFR BLD AUTO: 8.6 %
NEUTROPHILS # BLD AUTO: 4.7 X10 (3) UL (ref 1.5–7.7)
NEUTROPHILS # BLD AUTO: 4.7 X10(3) UL (ref 1.5–7.7)
NEUTROPHILS NFR BLD AUTO: 55.8 %
NONHDLC SERPL-MCNC: 70 MG/DL (ref ?–130)
OSMOLALITY SERPL CALC.SUM OF ELEC: 292 MOSM/KG (ref 275–295)
PLATELET # BLD AUTO: 229 10(3)UL (ref 150–450)
POTASSIUM SERPL-SCNC: 4.2 MMOL/L (ref 3.5–5.1)
PROT SERPL-MCNC: 7 G/DL (ref 6.4–8.2)
PSA SERPL-MCNC: 1.45 NG/ML (ref ?–4)
SODIUM SERPL-SCNC: 142 MMOL/L (ref 136–145)
T4 FREE SERPL-MCNC: 1.1 NG/DL (ref 0.8–1.7)
TRIGL SERPL-MCNC: 85 MG/DL (ref 30–149)
TSI SER-ACNC: 1.49 MIU/ML (ref 0.36–3.74)
VIT B12 SERPL-MCNC: 645 PG/ML (ref 193–986)
VIT D+METAB SERPL-MCNC: 11 NG/ML (ref 30–100)
WBC # BLD AUTO: 8.4 X10(3) UL (ref 4–11)

## 2022-03-09 PROCEDURE — 80061 LIPID PANEL: CPT | Performed by: FAMILY MEDICINE

## 2022-03-09 PROCEDURE — 82043 UR ALBUMIN QUANTITATIVE: CPT | Performed by: FAMILY MEDICINE

## 2022-03-09 PROCEDURE — 82570 ASSAY OF URINE CREATININE: CPT | Performed by: FAMILY MEDICINE

## 2022-03-09 PROCEDURE — 83036 HEMOGLOBIN GLYCOSYLATED A1C: CPT | Performed by: FAMILY MEDICINE

## 2022-03-09 PROCEDURE — 82306 VITAMIN D 25 HYDROXY: CPT | Performed by: FAMILY MEDICINE

## 2022-03-09 PROCEDURE — 84439 ASSAY OF FREE THYROXINE: CPT | Performed by: FAMILY MEDICINE

## 2022-03-09 PROCEDURE — 3060F POS MICROALBUMINURIA REV: CPT | Performed by: FAMILY MEDICINE

## 2022-03-09 PROCEDURE — 80050 GENERAL HEALTH PANEL: CPT | Performed by: FAMILY MEDICINE

## 2022-03-09 PROCEDURE — 3061F NEG MICROALBUMINURIA REV: CPT | Performed by: FAMILY MEDICINE

## 2022-03-09 PROCEDURE — 82607 VITAMIN B-12: CPT | Performed by: FAMILY MEDICINE

## 2022-03-09 PROCEDURE — 84153 ASSAY OF PSA TOTAL: CPT | Performed by: FAMILY MEDICINE

## 2022-03-15 ENCOUNTER — OFFICE VISIT (OUTPATIENT)
Dept: FAMILY MEDICINE CLINIC | Facility: CLINIC | Age: 62
End: 2022-03-15
Payer: COMMERCIAL

## 2022-03-15 VITALS
TEMPERATURE: 98 F | DIASTOLIC BLOOD PRESSURE: 78 MMHG | SYSTOLIC BLOOD PRESSURE: 118 MMHG | BODY MASS INDEX: 39.09 KG/M2 | OXYGEN SATURATION: 99 % | HEIGHT: 64 IN | RESPIRATION RATE: 18 BRPM | WEIGHT: 229 LBS | HEART RATE: 98 BPM

## 2022-03-15 DIAGNOSIS — Z86.73 HISTORY OF CVA (CEREBROVASCULAR ACCIDENT): ICD-10-CM

## 2022-03-15 DIAGNOSIS — Z00.00 ANNUAL PHYSICAL EXAM: Primary | ICD-10-CM

## 2022-03-15 DIAGNOSIS — E11.42 DM TYPE 2 WITH DIABETIC PERIPHERAL NEUROPATHY (HCC): ICD-10-CM

## 2022-03-15 DIAGNOSIS — I10 ESSENTIAL HYPERTENSION: ICD-10-CM

## 2022-03-15 PROCEDURE — 99213 OFFICE O/P EST LOW 20 MIN: CPT | Performed by: FAMILY MEDICINE

## 2022-03-15 PROCEDURE — 99396 PREV VISIT EST AGE 40-64: CPT | Performed by: FAMILY MEDICINE

## 2022-03-15 PROCEDURE — 3074F SYST BP LT 130 MM HG: CPT | Performed by: FAMILY MEDICINE

## 2022-03-15 PROCEDURE — 3078F DIAST BP <80 MM HG: CPT | Performed by: FAMILY MEDICINE

## 2022-03-15 PROCEDURE — 3008F BODY MASS INDEX DOCD: CPT | Performed by: FAMILY MEDICINE

## 2022-04-07 ENCOUNTER — TELEPHONE (OUTPATIENT)
Dept: NEUROLOGY | Facility: CLINIC | Age: 62
End: 2022-04-07

## 2022-04-07 NOTE — TELEPHONE ENCOUNTER
Pt came into the office to drop off Attending Physician's Statement form to be completed by Dr. Zack Andujar. Pt asked if he can be called once form is completed and also if we could fax it off for him. Placed in nurses bin for completion.

## 2022-04-11 NOTE — TELEPHONE ENCOUNTER
Paperwork initiated, endorsed to Dr. Manan Bailey for review/completion/signature. Office notes attached.

## 2022-05-09 NOTE — TELEPHONE ENCOUNTER
HPI  This is a 54-year-old -American female with past medical history significant for bipolar disorder/schizophrenia/depression who is here to follow up on blood work and for cervical cancer screening     Screening for cervical cancer  Patient is due for Pap smear she just turned 20. Pap ordered. LMP April 15, 2022. Sick last for about 5 days, occurs about every 30 days. Has some cramping that is relieved with ibuprofen. Denies any heavy bleeding. She is about 5 pads a day and recently switched over to the vaginal cup. Bipolar disorder  Patient follows up with psychiatry and was having a lot of side effects with her medication and was advised to speak with psychiatrist regarding changing medication     Lab work reviewed with patient and was normal.  HbA1c: 4.7 Patient states that she does not eat consistently while at school but now that she is at home she was advised to eat more frequent smaller meals about every two hours to make sure to prevent hypoglycemia and low blood glucose levels    Past Medical History  Past Medical History:   Diagnosis Date    Depression        Surgical History  History reviewed. No pertinent surgical history. Medications  Current Outpatient Medications   Medication Sig Dispense Refill    benztropine (COGENTIN) 0.5 mg tablet Take 0.5 mg by mouth two (2) times a day.  hydrOXYzine HCL (ATARAX) 50 mg tablet TAKE 1 TABLET BY MOUTH THREE TIMES DAILY AS NEEDED FOR ANXIETY OR AGITATION      QUEtiapine (SEROquel) 100 mg tablet       hydrocortisone (HYCORT) 1 % ointment Apply  to affected area two (2) times a day. use thin layer for two weeks then as needed.  (Patient not taking: Reported on 4/11/2022) 30 g 0       Allergies  No Known Allergies    Family History  Family History   Problem Relation Age of Onset    Immune Disorder Mother     No Known Problems Father        Social History  Social History     Socioeconomic History    Marital status: SINGLE     Spouse Resent to mail order and called [employer] to cancel orders. name: Not on file    Number of children: Not on file    Years of education: Not on file    Highest education level: Not on file   Occupational History    Not on file   Tobacco Use    Smoking status: Never Smoker    Smokeless tobacco: Never Used   Vaping Use    Vaping Use: Never used   Substance and Sexual Activity    Alcohol use: Not on file    Drug use: Not on file    Sexual activity: Not Currently     Birth control/protection: None   Other Topics Concern    Not on file   Social History Narrative    Not on file     Social Determinants of Health     Financial Resource Strain:     Difficulty of Paying Living Expenses: Not on file   Food Insecurity:     Worried About Running Out of Food in the Last Year: Not on file    Conchita of Food in the Last Year: Not on file   Transportation Needs:     Lack of Transportation (Medical): Not on file    Lack of Transportation (Non-Medical): Not on file   Physical Activity:     Days of Exercise per Week: Not on file    Minutes of Exercise per Session: Not on file   Stress:     Feeling of Stress : Not on file   Social Connections:     Frequency of Communication with Friends and Family: Not on file    Frequency of Social Gatherings with Friends and Family: Not on file    Attends Tenriism Services: Not on file    Active Member of [address] [place] [address] [employer] or Organizations: Not on file    Attends Club or Organization Meetings: Not on file    Marital Status: Not on file   Intimate Partner Violence:     Fear of Current or Ex-Partner: Not on file    Emotionally Abused: Not on file    Physically Abused: Not on file    Sexually Abused: Not on file   Housing Stability:     Unable to Pay for Housing in the Last Year: Not on file    Number of [place] in the Last Year: Not on file    Unstable Housing in the Last Year: Not on file       Review of Systems  Review of Systems   Constitutional: Negative for chills and fever.    Respiratory: Negative for cough and shortness of breath. Cardiovascular: Negative for chest pain and leg swelling. Gastrointestinal: Negative for abdominal pain, nausea and vomiting. Skin: Negative for rash. Neurological: Negative for dizziness and headaches. Vital Signs  Visit Vitals  /78 (BP 1 Location: Left upper arm, BP Patient Position: Sitting, BP Cuff Size: Adult)   Pulse 90   Temp 98.3 °F (36.8 °C) (Temporal)   Ht 5' 3\" (1.6 m)   Wt 163 lb (73.9 kg)   LMP 04/10/2022 (Exact Date)   SpO2 99%   BMI 28.87 kg/m²         Physical Exam  Physical Exam  Vitals reviewed. Constitutional:       Appearance: Normal appearance. HENT:      Head: Normocephalic and atraumatic. Right Ear: External ear normal.      Left Ear: External ear normal.      Nose: Nose normal.      Mouth/Throat:      Mouth: Mucous membranes are moist.   Eyes:      Extraocular Movements: Extraocular movements intact. Conjunctiva/sclera: Conjunctivae normal.   Cardiovascular:      Rate and Rhythm: Normal rate and regular rhythm. Pulses: Normal pulses. Heart sounds: Normal heart sounds. No murmur heard. No gallop. Pulmonary:      Effort: Pulmonary effort is normal. No respiratory distress. Breath sounds: Normal breath sounds. No wheezing. Abdominal:      General: Bowel sounds are normal. There is no distension. Palpations: Abdomen is soft. Tenderness: There is no abdominal tenderness. Genitourinary:     Comments: Pelvic exam:      External genitalia: no lesions    Vagina: no bleeding, white discharge present,    Cervix:  no motion tenderness, patent os without discharge, no lesions. Uterus: Small, mobile,non tender    Adnexae: No masses or tenderness bilaterally. Musculoskeletal:         General: Normal range of motion. Cervical back: Normal range of motion. Right lower leg: No edema. Left lower leg: No edema. Skin:     General: Skin is warm. Neurological:      General: No focal deficit present.       Mental Status: She is alert and oriented to person, place, and time. Cranial Nerves: No cranial nerve deficit. Motor: No weakness. Gait: Gait normal.   Psychiatric:         Mood and Affect: Mood normal.         Behavior: Behavior normal.                Results  Results for orders placed or performed during the hospital encounter of 04/11/22   [facility] SPECIMEN COL   Result Value Ref Range    [facility] SPECIMEN COLLN. Specimens collected/sent to [facility]. Please direct inquiries to ([phone]). ASSESSMENT and PLAN  1. Screening for STD (sexually transmitted disease)  - NUSWAB VAGINITIS + HSV; Future    2. Encounter for Papanicolaou smear for cervical cancer screening  - PAP IG, CT-NG-TV, RFX APTIMA HPV ASCUS ([id number]); Future     Advised patient to establish care with new PCP as I am leaving July 1. List of providers in the area accepting new patients given to the patient today. Follow-up and Dispositions    · Return in about 6 months (around 11/9/2022), or if symptoms worsen or fail to improve, for est care with new PCP-list given to patient. I have discussed the diagnosis with the patient and the intended plan of care as seen in the above orders. The patient has received an after-visit summary and questions were answered concerning future plans. I have discussed medication, side effects, and warnings with the patient in detail. The patient verbalized understanding and is in agreement with the plan of care. The patient will contact the office with any additional concerns. I spent at least 30 minutes on this visit with this established patient. Pamela Wilson MD    PLEASE NOTE:   This document has been produced using voice recognition software.  Unrecognized errors in transcription may be present

## 2022-06-03 DIAGNOSIS — I69.354 HEMIPARESIS AFFECTING LEFT SIDE AS LATE EFFECT OF STROKE (HCC): ICD-10-CM

## 2022-06-03 DIAGNOSIS — E11.42 DM TYPE 2 WITH DIABETIC PERIPHERAL NEUROPATHY (HCC): ICD-10-CM

## 2022-06-04 RX ORDER — SIMVASTATIN 40 MG
TABLET ORAL
Qty: 90 TABLET | Refills: 3 | Status: SHIPPED | OUTPATIENT
Start: 2022-06-04

## 2022-06-08 ENCOUNTER — LAB ENCOUNTER (OUTPATIENT)
Dept: LAB | Age: 62
End: 2022-06-08
Attending: FAMILY MEDICINE
Payer: COMMERCIAL

## 2022-06-08 DIAGNOSIS — Z00.00 ROUTINE GENERAL MEDICAL EXAMINATION AT A HEALTH CARE FACILITY: Primary | ICD-10-CM

## 2022-06-08 LAB
ALBUMIN SERPL-MCNC: 3.7 G/DL (ref 3.4–5)
ALBUMIN/GLOB SERPL: 1 {RATIO} (ref 1–2)
ALP LIVER SERPL-CCNC: 57 U/L
ALT SERPL-CCNC: 12 U/L
ANION GAP SERPL CALC-SCNC: 9 MMOL/L (ref 0–18)
AST SERPL-CCNC: 9 U/L (ref 15–37)
BILIRUB SERPL-MCNC: 0.8 MG/DL (ref 0.1–2)
BUN BLD-MCNC: 14 MG/DL (ref 7–18)
CALCIUM BLD-MCNC: 9 MG/DL (ref 8.5–10.1)
CHLORIDE SERPL-SCNC: 107 MMOL/L (ref 98–112)
CHOLEST SERPL-MCNC: 144 MG/DL (ref ?–200)
CO2 SERPL-SCNC: 26 MMOL/L (ref 21–32)
CREAT BLD-MCNC: 0.97 MG/DL
EST. AVERAGE GLUCOSE BLD GHB EST-MCNC: 143 MG/DL (ref 68–126)
FASTING PATIENT LIPID ANSWER: YES
FASTING STATUS PATIENT QL REPORTED: YES
GLOBULIN PLAS-MCNC: 3.6 G/DL (ref 2.8–4.4)
GLUCOSE BLD-MCNC: 119 MG/DL (ref 70–99)
HBA1C MFR BLD: 6.6 % (ref ?–5.7)
HDLC SERPL-MCNC: 61 MG/DL (ref 40–59)
LDLC SERPL CALC-MCNC: 69 MG/DL (ref ?–100)
NONHDLC SERPL-MCNC: 83 MG/DL (ref ?–130)
OSMOLALITY SERPL CALC.SUM OF ELEC: 296 MOSM/KG (ref 275–295)
POTASSIUM SERPL-SCNC: 4.4 MMOL/L (ref 3.5–5.1)
PROT SERPL-MCNC: 7.3 G/DL (ref 6.4–8.2)
SODIUM SERPL-SCNC: 142 MMOL/L (ref 136–145)
TRIGL SERPL-MCNC: 67 MG/DL (ref 30–149)
VLDLC SERPL CALC-MCNC: 10 MG/DL (ref 0–30)

## 2022-06-08 PROCEDURE — 80061 LIPID PANEL: CPT | Performed by: FAMILY MEDICINE

## 2022-06-08 PROCEDURE — 80053 COMPREHEN METABOLIC PANEL: CPT | Performed by: FAMILY MEDICINE

## 2022-06-08 PROCEDURE — 3044F HG A1C LEVEL LT 7.0%: CPT | Performed by: FAMILY MEDICINE

## 2022-06-08 PROCEDURE — 83036 HEMOGLOBIN GLYCOSYLATED A1C: CPT | Performed by: FAMILY MEDICINE

## 2022-06-14 ENCOUNTER — OFFICE VISIT (OUTPATIENT)
Dept: FAMILY MEDICINE CLINIC | Facility: CLINIC | Age: 62
End: 2022-06-14
Payer: COMMERCIAL

## 2022-06-14 VITALS
HEIGHT: 64 IN | TEMPERATURE: 98 F | HEART RATE: 67 BPM | DIASTOLIC BLOOD PRESSURE: 74 MMHG | RESPIRATION RATE: 18 BRPM | WEIGHT: 223 LBS | OXYGEN SATURATION: 98 % | SYSTOLIC BLOOD PRESSURE: 120 MMHG | BODY MASS INDEX: 38.07 KG/M2

## 2022-06-14 DIAGNOSIS — I10 ESSENTIAL HYPERTENSION: ICD-10-CM

## 2022-06-14 DIAGNOSIS — Z86.73 HISTORY OF CVA (CEREBROVASCULAR ACCIDENT): ICD-10-CM

## 2022-06-14 DIAGNOSIS — E11.42 DM TYPE 2 WITH DIABETIC PERIPHERAL NEUROPATHY (HCC): Primary | ICD-10-CM

## 2022-06-14 PROCEDURE — 3078F DIAST BP <80 MM HG: CPT | Performed by: FAMILY MEDICINE

## 2022-06-14 PROCEDURE — 3074F SYST BP LT 130 MM HG: CPT | Performed by: FAMILY MEDICINE

## 2022-06-14 PROCEDURE — 99214 OFFICE O/P EST MOD 30 MIN: CPT | Performed by: FAMILY MEDICINE

## 2022-06-14 PROCEDURE — 3008F BODY MASS INDEX DOCD: CPT | Performed by: FAMILY MEDICINE

## 2022-06-21 DIAGNOSIS — I10 ESSENTIAL HYPERTENSION: ICD-10-CM

## 2022-06-22 RX ORDER — LISINOPRIL 40 MG/1
TABLET ORAL
Qty: 90 TABLET | Refills: 3 | Status: SHIPPED | OUTPATIENT
Start: 2022-06-22

## 2022-06-22 RX ORDER — AMLODIPINE BESYLATE 10 MG/1
TABLET ORAL
Qty: 90 TABLET | Refills: 3 | Status: SHIPPED | OUTPATIENT
Start: 2022-06-22

## 2022-07-12 ENCOUNTER — TELEPHONE (OUTPATIENT)
Dept: FAMILY MEDICINE CLINIC | Facility: CLINIC | Age: 62
End: 2022-07-12

## 2022-07-12 ENCOUNTER — HOSPITAL ENCOUNTER (OUTPATIENT)
Age: 62
Discharge: HOME | End: 2022-07-12
Payer: COMMERCIAL

## 2022-07-12 ENCOUNTER — APPOINTMENT (OUTPATIENT)
Dept: ULTRASOUND IMAGING | Age: 62
End: 2022-07-12
Attending: PHYSICIAN ASSISTANT
Payer: COMMERCIAL

## 2022-07-12 ENCOUNTER — APPOINTMENT (OUTPATIENT)
Dept: GENERAL RADIOLOGY | Age: 62
End: 2022-07-12
Attending: PHYSICIAN ASSISTANT
Payer: COMMERCIAL

## 2022-07-12 VITALS
TEMPERATURE: 98 F | HEIGHT: 64 IN | DIASTOLIC BLOOD PRESSURE: 68 MMHG | HEART RATE: 64 BPM | OXYGEN SATURATION: 95 % | RESPIRATION RATE: 16 BRPM | SYSTOLIC BLOOD PRESSURE: 113 MMHG | WEIGHT: 212 LBS | BODY MASS INDEX: 36.19 KG/M2

## 2022-07-12 DIAGNOSIS — M79.89 SWELLING OF LOWER EXTREMITY: ICD-10-CM

## 2022-07-12 DIAGNOSIS — M51.36 DEGENERATIVE DISC DISEASE, LUMBAR: Primary | ICD-10-CM

## 2022-07-12 LAB
#MXD IC: 1.1 X10ˆ3/UL (ref 0.1–1)
BUN BLD-MCNC: 16 MG/DL (ref 7–18)
CHLORIDE BLD-SCNC: 104 MMOL/L (ref 98–112)
CO2 BLD-SCNC: 25 MMOL/L (ref 21–32)
CREAT BLD-MCNC: 0.8 MG/DL
GLUCOSE BLD-MCNC: 92 MG/DL (ref 70–99)
HCT VFR BLD AUTO: 45.7 %
HCT VFR BLD CALC: 45 %
HGB BLD-MCNC: 14.1 G/DL
ISTAT IONIZED CALCIUM FOR CHEM 8: 1.18 MMOL/L (ref 1.12–1.32)
LYMPHOCYTES # BLD AUTO: 1.7 X10ˆ3/UL (ref 1–4)
LYMPHOCYTES NFR BLD AUTO: 33.5 %
MCH RBC QN AUTO: 28.1 PG (ref 26–34)
MCHC RBC AUTO-ENTMCNC: 30.9 G/DL (ref 31–37)
MCV RBC AUTO: 91.2 FL (ref 80–100)
MIXED CELL %: 22 %
NEUTROPHILS # BLD AUTO: 2.3 X10ˆ3/UL (ref 1.5–7.7)
NEUTROPHILS NFR BLD AUTO: 44.5 %
PLATELET # BLD AUTO: 168 X10ˆ3/UL (ref 150–450)
POTASSIUM BLD-SCNC: 4.2 MMOL/L (ref 3.6–5.1)
RBC # BLD AUTO: 5.01 X10ˆ6/UL
SODIUM BLD-SCNC: 142 MMOL/L (ref 136–145)
WBC # BLD AUTO: 5.1 X10ˆ3/UL (ref 4–11)

## 2022-07-12 PROCEDURE — 80047 BASIC METABLC PNL IONIZED CA: CPT

## 2022-07-12 PROCEDURE — 99204 OFFICE O/P NEW MOD 45 MIN: CPT

## 2022-07-12 PROCEDURE — 99214 OFFICE O/P EST MOD 30 MIN: CPT

## 2022-07-12 PROCEDURE — 36415 COLL VENOUS BLD VENIPUNCTURE: CPT

## 2022-07-12 PROCEDURE — 72110 X-RAY EXAM L-2 SPINE 4/>VWS: CPT | Performed by: PHYSICIAN ASSISTANT

## 2022-07-12 PROCEDURE — 71046 X-RAY EXAM CHEST 2 VIEWS: CPT | Performed by: PHYSICIAN ASSISTANT

## 2022-07-12 PROCEDURE — 85025 COMPLETE CBC W/AUTO DIFF WBC: CPT | Performed by: PHYSICIAN ASSISTANT

## 2022-07-12 PROCEDURE — 99215 OFFICE O/P EST HI 40 MIN: CPT

## 2022-07-12 PROCEDURE — 93970 EXTREMITY STUDY: CPT | Performed by: PHYSICIAN ASSISTANT

## 2022-07-12 RX ORDER — HYDROCODONE BITARTRATE AND ACETAMINOPHEN 5; 325 MG/1; MG/1
1 TABLET ORAL EVERY 6 HOURS PRN
Qty: 10 TABLET | Refills: 0 | Status: SHIPPED | OUTPATIENT
Start: 2022-07-12 | End: 2022-07-17

## 2022-07-12 RX ORDER — CYCLOBENZAPRINE HCL 5 MG
TABLET ORAL 3 TIMES DAILY PRN
Qty: 20 TABLET | Refills: 0 | Status: SHIPPED | OUTPATIENT
Start: 2022-07-12

## 2022-07-12 RX ORDER — PREDNISONE 10 MG/1
TABLET ORAL
Qty: 20 TABLET | Refills: 0 | Status: SHIPPED | OUTPATIENT
Start: 2022-07-12

## 2022-07-12 NOTE — ED INITIAL ASSESSMENT (HPI)
Pt presents today with c/o low back pain that he woke up with yesterday. Pt denies any injury to his back. Pt states that he also has pain to bilateral lower extremities and swelling to his feet. Pt states that he has some tingling to his left foot. Pt denies any loss of control of bowel/bladder.

## 2022-07-12 NOTE — TELEPHONE ENCOUNTER
Pt calling with L Leg pain. He woke up with it yesterday. Swollen and painful in calf area. Also having lower back pain. Would like appt with Dr Miguel Taylor. Pls call to advise.

## 2022-07-12 NOTE — TELEPHONE ENCOUNTER
Pt states he woke up yesterday with painful L calf and swollen L foot. He also has sore bilat thighs and lower back. He has used diclofenac topical and ice for these areas, but still has discomfort. Given pt's hx and problem list and sx, advised urgent care to r/o DVT. Pt understands and agrees with plan and will go to . Will call us if he needs follow up.    HECTOR RANDHAWA

## 2022-07-14 ENCOUNTER — TELEPHONE (OUTPATIENT)
Dept: NEUROLOGY | Facility: CLINIC | Age: 62
End: 2022-07-14

## 2022-07-14 NOTE — TELEPHONE ENCOUNTER
pt recently seen in UC for low back pain and lower leg pain; pt of Dr Carrie Perez for Hx of stroke; pt told to f/u with neuro; pt scheduled first avail with Dr Pedro Yeung in September; pt would like to speak to nurse with condition update; pls call him at [phone]

## 2022-07-22 ENCOUNTER — OFFICE VISIT (OUTPATIENT)
Dept: FAMILY MEDICINE CLINIC | Facility: CLINIC | Age: 62
End: 2022-07-22
Payer: COMMERCIAL

## 2022-07-22 VITALS
OXYGEN SATURATION: 98 % | RESPIRATION RATE: 16 BRPM | HEIGHT: 64 IN | WEIGHT: 215 LBS | BODY MASS INDEX: 36.7 KG/M2 | HEART RATE: 71 BPM | DIASTOLIC BLOOD PRESSURE: 58 MMHG | SYSTOLIC BLOOD PRESSURE: 112 MMHG

## 2022-07-22 DIAGNOSIS — M54.50 CHRONIC BILATERAL LOW BACK PAIN WITHOUT SCIATICA: Primary | ICD-10-CM

## 2022-07-22 DIAGNOSIS — G89.29 CHRONIC BILATERAL LOW BACK PAIN WITHOUT SCIATICA: Primary | ICD-10-CM

## 2022-07-22 PROCEDURE — 3074F SYST BP LT 130 MM HG: CPT | Performed by: FAMILY MEDICINE

## 2022-07-22 PROCEDURE — 3078F DIAST BP <80 MM HG: CPT | Performed by: FAMILY MEDICINE

## 2022-07-22 PROCEDURE — 99213 OFFICE O/P EST LOW 20 MIN: CPT | Performed by: FAMILY MEDICINE

## 2022-07-22 PROCEDURE — 3008F BODY MASS INDEX DOCD: CPT | Performed by: FAMILY MEDICINE

## 2022-08-10 ENCOUNTER — TELEPHONE (OUTPATIENT)
Dept: PHYSICAL THERAPY | Facility: HOSPITAL | Age: 62
End: 2022-08-10

## 2022-08-10 ENCOUNTER — TELEPHONE (OUTPATIENT)
Dept: SURGERY | Facility: CLINIC | Age: 62
End: 2022-08-10

## 2022-08-10 DIAGNOSIS — I69.354 HEMIPARESIS AFFECTING LEFT SIDE AS LATE EFFECT OF STROKE (HCC): Primary | ICD-10-CM

## 2022-08-10 DIAGNOSIS — E11.42 DM TYPE 2 WITH DIABETIC PERIPHERAL NEUROPATHY (HCC): ICD-10-CM

## 2022-08-10 NOTE — TELEPHONE ENCOUNTER
[facility] calling asking if pt needs new referral as last order was from Dr MEJIA, or is order ok for now? Please enter new order if needed.

## 2022-08-12 ENCOUNTER — ORDER TRANSCRIPTION (OUTPATIENT)
Dept: PHYSICAL THERAPY | Facility: HOSPITAL | Age: 62
End: 2022-08-12

## 2022-08-12 DIAGNOSIS — I69.354 HEMIPARESIS AFFECTING LEFT SIDE AS LATE EFFECT OF CEREBROVASCULAR ACCIDENT (CVA) (HCC): Primary | ICD-10-CM

## 2022-08-30 ENCOUNTER — TELEPHONE (OUTPATIENT)
Dept: NEUROLOGY | Facility: CLINIC | Age: 62
End: 2022-08-30

## 2022-09-07 ENCOUNTER — TELEPHONE (OUTPATIENT)
Dept: ADMINISTRATIVE | Age: 62
End: 2022-09-07

## 2022-09-08 ENCOUNTER — TELEPHONE (OUTPATIENT)
Dept: PHYSICAL THERAPY | Facility: HOSPITAL | Age: 62
End: 2022-09-08

## 2022-09-09 ENCOUNTER — TELEPHONE (OUTPATIENT)
Dept: PHYSICAL THERAPY | Facility: HOSPITAL | Age: 62
End: 2022-09-09

## 2022-09-12 ENCOUNTER — LAB ENCOUNTER (OUTPATIENT)
Dept: LAB | Age: 62
End: 2022-09-12
Attending: FAMILY MEDICINE
Payer: COMMERCIAL

## 2022-09-12 LAB
ALBUMIN SERPL-MCNC: 3.6 G/DL (ref 3.4–5)
ALBUMIN/GLOB SERPL: 1.1 {RATIO} (ref 1–2)
ALP LIVER SERPL-CCNC: 56 U/L
ALT SERPL-CCNC: 9 U/L
ANION GAP SERPL CALC-SCNC: 7 MMOL/L (ref 0–18)
AST SERPL-CCNC: 8 U/L (ref 15–37)
BILIRUB SERPL-MCNC: 0.6 MG/DL (ref 0.1–2)
BUN BLD-MCNC: 15 MG/DL (ref 7–18)
BUN/CREAT SERPL: 18.8 (ref 10–20)
CALCIUM BLD-MCNC: 8.6 MG/DL (ref 8.5–10.1)
CHLORIDE SERPL-SCNC: 110 MMOL/L (ref 98–112)
CHOLEST SERPL-MCNC: 142 MG/DL (ref ?–200)
CO2 SERPL-SCNC: 24 MMOL/L (ref 21–32)
CREAT BLD-MCNC: 0.8 MG/DL
EST. AVERAGE GLUCOSE BLD GHB EST-MCNC: 140 MG/DL (ref 68–126)
FASTING PATIENT LIPID ANSWER: YES
FASTING STATUS PATIENT QL REPORTED: YES
GFR SERPLBLD BASED ON 1.73 SQ M-ARVRAT: 100 ML/MIN/1.73M2 (ref 60–?)
GLOBULIN PLAS-MCNC: 3.4 G/DL (ref 2.8–4.4)
GLUCOSE BLD-MCNC: 108 MG/DL (ref 70–99)
HBA1C MFR BLD: 6.5 % (ref ?–5.7)
HDLC SERPL-MCNC: 60 MG/DL (ref 40–59)
LDLC SERPL CALC-MCNC: 68 MG/DL (ref ?–100)
NONHDLC SERPL-MCNC: 82 MG/DL (ref ?–130)
OSMOLALITY SERPL CALC.SUM OF ELEC: 293 MOSM/KG (ref 275–295)
POTASSIUM SERPL-SCNC: 3.9 MMOL/L (ref 3.5–5.1)
PROT SERPL-MCNC: 7 G/DL (ref 6.4–8.2)
SODIUM SERPL-SCNC: 141 MMOL/L (ref 136–145)
TRIGL SERPL-MCNC: 73 MG/DL (ref 30–149)
VLDLC SERPL CALC-MCNC: 11 MG/DL (ref 0–30)

## 2022-09-12 PROCEDURE — 81374 HLA I TYPING 1 ANTIGEN LR: CPT | Performed by: FAMILY MEDICINE

## 2022-09-12 PROCEDURE — 80053 COMPREHEN METABOLIC PANEL: CPT | Performed by: FAMILY MEDICINE

## 2022-09-12 PROCEDURE — 83036 HEMOGLOBIN GLYCOSYLATED A1C: CPT | Performed by: FAMILY MEDICINE

## 2022-09-12 PROCEDURE — 80061 LIPID PANEL: CPT | Performed by: FAMILY MEDICINE

## 2022-09-12 PROCEDURE — 3044F HG A1C LEVEL LT 7.0%: CPT | Performed by: FAMILY MEDICINE

## 2022-09-12 RX ORDER — EMPAGLIFLOZIN 10 MG/1
TABLET, FILM COATED ORAL
Qty: 90 TABLET | Refills: 3 | Status: SHIPPED | OUTPATIENT
Start: 2022-09-12

## 2022-09-12 NOTE — TELEPHONE ENCOUNTER
Next Appt:    With [facility] Vik Jacobs DO)  09/14/2022 at 9:15 AM     7-22-22    lr 9-16-21

## 2022-09-13 ENCOUNTER — APPOINTMENT (OUTPATIENT)
Dept: PHYSICAL THERAPY | Age: 62
End: 2022-09-13
Attending: Other
Payer: COMMERCIAL

## 2022-09-14 ENCOUNTER — OFFICE VISIT (OUTPATIENT)
Dept: FAMILY MEDICINE CLINIC | Facility: CLINIC | Age: 62
End: 2022-09-14
Payer: COMMERCIAL

## 2022-09-14 ENCOUNTER — HOSPITAL ENCOUNTER (OUTPATIENT)
Dept: MRI IMAGING | Age: 62
Discharge: HOME | End: 2022-09-14
Attending: Other
Payer: COMMERCIAL

## 2022-09-14 ENCOUNTER — OFFICE VISIT (OUTPATIENT)
Dept: PHYSICAL THERAPY | Age: 62
End: 2022-09-14
Attending: FAMILY MEDICINE
Payer: COMMERCIAL

## 2022-09-14 VITALS
RESPIRATION RATE: 16 BRPM | SYSTOLIC BLOOD PRESSURE: 126 MMHG | HEIGHT: 64 IN | WEIGHT: 220 LBS | DIASTOLIC BLOOD PRESSURE: 68 MMHG | OXYGEN SATURATION: 98 % | BODY MASS INDEX: 37.56 KG/M2 | HEART RATE: 77 BPM

## 2022-09-14 DIAGNOSIS — M54.41 ACUTE BILATERAL LOW BACK PAIN WITH BILATERAL SCIATICA: ICD-10-CM

## 2022-09-14 DIAGNOSIS — M54.42 ACUTE BILATERAL LOW BACK PAIN WITH BILATERAL SCIATICA: ICD-10-CM

## 2022-09-14 DIAGNOSIS — I10 ESSENTIAL HYPERTENSION: ICD-10-CM

## 2022-09-14 DIAGNOSIS — Z86.73 HISTORY OF CVA (CEREBROVASCULAR ACCIDENT): ICD-10-CM

## 2022-09-14 DIAGNOSIS — E11.42 DM TYPE 2 WITH DIABETIC PERIPHERAL NEUROPATHY (HCC): Primary | ICD-10-CM

## 2022-09-14 PROCEDURE — 72148 MRI LUMBAR SPINE W/O DYE: CPT | Performed by: OTHER

## 2022-09-14 PROCEDURE — 3074F SYST BP LT 130 MM HG: CPT | Performed by: FAMILY MEDICINE

## 2022-09-14 PROCEDURE — 97163 PT EVAL HIGH COMPLEX 45 MIN: CPT | Performed by: PHYSICAL THERAPIST

## 2022-09-14 PROCEDURE — 3078F DIAST BP <80 MM HG: CPT | Performed by: FAMILY MEDICINE

## 2022-09-14 PROCEDURE — 99214 OFFICE O/P EST MOD 30 MIN: CPT | Performed by: FAMILY MEDICINE

## 2022-09-14 PROCEDURE — 3008F BODY MASS INDEX DOCD: CPT | Performed by: FAMILY MEDICINE

## 2022-09-14 PROCEDURE — 97110 THERAPEUTIC EXERCISES: CPT | Performed by: PHYSICAL THERAPIST

## 2022-09-15 ENCOUNTER — APPOINTMENT (OUTPATIENT)
Dept: PHYSICAL THERAPY | Age: 62
End: 2022-09-15
Attending: Other
Payer: COMMERCIAL

## 2022-09-15 ENCOUNTER — OFFICE VISIT (OUTPATIENT)
Dept: PHYSICAL THERAPY | Age: 62
End: 2022-09-15
Attending: Other
Payer: COMMERCIAL

## 2022-09-15 ENCOUNTER — APPOINTMENT (OUTPATIENT)
Dept: PHYSICAL THERAPY | Age: 62
End: 2022-09-15
Payer: COMMERCIAL

## 2022-09-15 PROCEDURE — 97140 MANUAL THERAPY 1/> REGIONS: CPT | Performed by: PHYSICAL THERAPIST

## 2022-09-15 PROCEDURE — 97110 THERAPEUTIC EXERCISES: CPT | Performed by: PHYSICAL THERAPIST

## 2022-09-15 PROCEDURE — 97116 GAIT TRAINING THERAPY: CPT | Performed by: PHYSICAL THERAPIST

## 2022-09-16 LAB — HLA-B27: NEGATIVE

## 2022-09-20 ENCOUNTER — OFFICE VISIT (OUTPATIENT)
Dept: PHYSICAL THERAPY | Age: 62
End: 2022-09-20
Attending: Other
Payer: COMMERCIAL

## 2022-09-20 ENCOUNTER — APPOINTMENT (OUTPATIENT)
Dept: PHYSICAL THERAPY | Age: 62
End: 2022-09-20
Payer: COMMERCIAL

## 2022-09-20 DIAGNOSIS — I69.354 HEMIPARESIS AFFECTING LEFT SIDE AS LATE EFFECT OF CEREBROVASCULAR ACCIDENT (CVA) (HCC): ICD-10-CM

## 2022-09-20 PROCEDURE — 97110 THERAPEUTIC EXERCISES: CPT | Performed by: PHYSICAL THERAPIST

## 2022-09-20 PROCEDURE — 97140 MANUAL THERAPY 1/> REGIONS: CPT | Performed by: PHYSICAL THERAPIST

## 2022-09-20 PROCEDURE — 97116 GAIT TRAINING THERAPY: CPT | Performed by: PHYSICAL THERAPIST

## 2022-09-22 ENCOUNTER — OFFICE VISIT (OUTPATIENT)
Dept: NEUROLOGY | Facility: CLINIC | Age: 62
End: 2022-09-22

## 2022-09-22 VITALS
SYSTOLIC BLOOD PRESSURE: 117 MMHG | DIASTOLIC BLOOD PRESSURE: 59 MMHG | WEIGHT: 223 LBS | BODY MASS INDEX: 38 KG/M2 | HEART RATE: 79 BPM

## 2022-09-22 DIAGNOSIS — M51.36 LUMBAR DEGENERATIVE DISC DISEASE: Primary | ICD-10-CM

## 2022-09-22 DIAGNOSIS — R26.9 GAIT ABNORMALITY: ICD-10-CM

## 2022-09-22 DIAGNOSIS — I69.354 HEMIPARESIS AFFECTING LEFT SIDE AS LATE EFFECT OF STROKE (HCC): ICD-10-CM

## 2022-09-22 DIAGNOSIS — G44.229 CHRONIC TENSION-TYPE HEADACHE, NOT INTRACTABLE: ICD-10-CM

## 2022-09-22 PROCEDURE — 3074F SYST BP LT 130 MM HG: CPT | Performed by: OTHER

## 2022-09-22 PROCEDURE — 99214 OFFICE O/P EST MOD 30 MIN: CPT | Performed by: OTHER

## 2022-09-22 PROCEDURE — 3078F DIAST BP <80 MM HG: CPT | Performed by: OTHER

## 2022-09-22 RX ORDER — GABAPENTIN 300 MG/1
300 CAPSULE ORAL 2 TIMES DAILY
Qty: 180 CAPSULE | Refills: 0 | Status: SHIPPED | OUTPATIENT
Start: 2022-09-22

## 2022-09-22 RX ORDER — MELATONIN
1000 DAILY
COMMUNITY

## 2022-09-22 RX ORDER — MULTIVIT-MIN/IRON/FOLIC ACID/K 18-600-40
CAPSULE ORAL
COMMUNITY

## 2022-09-22 NOTE — PROGRESS NOTES
Patient reports the back pain that was shooting into the legs has resolved. He continues to have gait issues, but denies any new stroke symptoms. Currently in PT for low back pain 2 times per week and notes improvement. Patient reports headaches one per week.

## 2022-09-23 ENCOUNTER — APPOINTMENT (OUTPATIENT)
Dept: PHYSICAL THERAPY | Age: 62
End: 2022-09-23
Payer: COMMERCIAL

## 2022-09-23 ENCOUNTER — OFFICE VISIT (OUTPATIENT)
Dept: PHYSICAL THERAPY | Age: 62
End: 2022-09-23
Attending: Other
Payer: COMMERCIAL

## 2022-09-23 DIAGNOSIS — I69.354 HEMIPARESIS AFFECTING LEFT SIDE AS LATE EFFECT OF CEREBROVASCULAR ACCIDENT (CVA) (HCC): ICD-10-CM

## 2022-09-23 PROCEDURE — 97140 MANUAL THERAPY 1/> REGIONS: CPT | Performed by: PHYSICAL THERAPIST

## 2022-09-23 PROCEDURE — 97116 GAIT TRAINING THERAPY: CPT | Performed by: PHYSICAL THERAPIST

## 2022-09-23 PROCEDURE — 97110 THERAPEUTIC EXERCISES: CPT | Performed by: PHYSICAL THERAPIST

## 2022-09-27 ENCOUNTER — APPOINTMENT (OUTPATIENT)
Dept: PHYSICAL THERAPY | Age: 62
End: 2022-09-27
Payer: COMMERCIAL

## 2022-09-27 ENCOUNTER — OFFICE VISIT (OUTPATIENT)
Dept: PHYSICAL THERAPY | Age: 62
End: 2022-09-27
Attending: Other
Payer: COMMERCIAL

## 2022-09-27 DIAGNOSIS — I69.354 HEMIPARESIS AFFECTING LEFT SIDE AS LATE EFFECT OF CEREBROVASCULAR ACCIDENT (CVA) (HCC): ICD-10-CM

## 2022-09-27 PROCEDURE — 97110 THERAPEUTIC EXERCISES: CPT | Performed by: PHYSICAL THERAPIST

## 2022-09-27 PROCEDURE — 97116 GAIT TRAINING THERAPY: CPT | Performed by: PHYSICAL THERAPIST

## 2022-09-29 ENCOUNTER — APPOINTMENT (OUTPATIENT)
Dept: PHYSICAL THERAPY | Age: 62
End: 2022-09-29
Payer: COMMERCIAL

## 2022-09-29 ENCOUNTER — OFFICE VISIT (OUTPATIENT)
Dept: PHYSICAL THERAPY | Age: 62
End: 2022-09-29
Attending: Other
Payer: COMMERCIAL

## 2022-09-29 DIAGNOSIS — I69.354 HEMIPARESIS AFFECTING LEFT SIDE AS LATE EFFECT OF CEREBROVASCULAR ACCIDENT (CVA) (HCC): ICD-10-CM

## 2022-09-29 PROCEDURE — 97110 THERAPEUTIC EXERCISES: CPT | Performed by: PHYSICAL THERAPIST

## 2022-09-29 PROCEDURE — 97116 GAIT TRAINING THERAPY: CPT | Performed by: PHYSICAL THERAPIST

## 2022-09-29 PROCEDURE — 97140 MANUAL THERAPY 1/> REGIONS: CPT | Performed by: PHYSICAL THERAPIST

## 2022-10-04 ENCOUNTER — APPOINTMENT (OUTPATIENT)
Dept: PHYSICAL THERAPY | Age: 62
End: 2022-10-04
Payer: COMMERCIAL

## 2022-10-04 ENCOUNTER — APPOINTMENT (OUTPATIENT)
Dept: PHYSICAL THERAPY | Age: 62
End: 2022-10-04
Attending: Other
Payer: COMMERCIAL

## 2022-10-05 ENCOUNTER — OFFICE VISIT (OUTPATIENT)
Dept: PHYSICAL THERAPY | Age: 62
End: 2022-10-05
Attending: Other
Payer: COMMERCIAL

## 2022-10-05 ENCOUNTER — TELEPHONE (OUTPATIENT)
Dept: PHYSICAL THERAPY | Facility: HOSPITAL | Age: 62
End: 2022-10-05

## 2022-10-05 DIAGNOSIS — I69.354 HEMIPARESIS AFFECTING LEFT SIDE AS LATE EFFECT OF CEREBROVASCULAR ACCIDENT (CVA) (HCC): ICD-10-CM

## 2022-10-05 PROCEDURE — 97140 MANUAL THERAPY 1/> REGIONS: CPT | Performed by: PHYSICAL THERAPIST

## 2022-10-05 PROCEDURE — 97116 GAIT TRAINING THERAPY: CPT | Performed by: PHYSICAL THERAPIST

## 2022-10-05 PROCEDURE — 97110 THERAPEUTIC EXERCISES: CPT | Performed by: PHYSICAL THERAPIST

## 2022-10-06 ENCOUNTER — APPOINTMENT (OUTPATIENT)
Dept: PHYSICAL THERAPY | Age: 62
End: 2022-10-06
Payer: COMMERCIAL

## 2022-10-06 ENCOUNTER — APPOINTMENT (OUTPATIENT)
Dept: PHYSICAL THERAPY | Age: 62
End: 2022-10-06
Attending: Other
Payer: COMMERCIAL

## 2022-10-07 ENCOUNTER — OFFICE VISIT (OUTPATIENT)
Dept: PHYSICAL THERAPY | Age: 62
End: 2022-10-07
Attending: Other
Payer: COMMERCIAL

## 2022-10-07 DIAGNOSIS — I69.354 HEMIPARESIS AFFECTING LEFT SIDE AS LATE EFFECT OF CEREBROVASCULAR ACCIDENT (CVA) (HCC): ICD-10-CM

## 2022-10-07 PROCEDURE — 97116 GAIT TRAINING THERAPY: CPT | Performed by: PHYSICAL THERAPIST

## 2022-10-07 PROCEDURE — 97110 THERAPEUTIC EXERCISES: CPT | Performed by: PHYSICAL THERAPIST

## 2022-10-10 ENCOUNTER — TELEPHONE (OUTPATIENT)
Dept: PHYSICAL THERAPY | Facility: HOSPITAL | Age: 62
End: 2022-10-10

## 2022-10-12 ENCOUNTER — OFFICE VISIT (OUTPATIENT)
Dept: FAMILY MEDICINE CLINIC | Facility: CLINIC | Age: 62
End: 2022-10-12
Payer: COMMERCIAL

## 2022-10-12 VITALS
HEART RATE: 78 BPM | BODY MASS INDEX: 38.41 KG/M2 | DIASTOLIC BLOOD PRESSURE: 70 MMHG | RESPIRATION RATE: 16 BRPM | WEIGHT: 225 LBS | HEIGHT: 64 IN | SYSTOLIC BLOOD PRESSURE: 116 MMHG | OXYGEN SATURATION: 98 %

## 2022-10-12 DIAGNOSIS — D22.9 ATYPICAL NEVUS: Primary | ICD-10-CM

## 2022-10-12 PROCEDURE — 88305 TISSUE EXAM BY PATHOLOGIST: CPT | Performed by: FAMILY MEDICINE

## 2022-10-12 PROCEDURE — 3078F DIAST BP <80 MM HG: CPT | Performed by: FAMILY MEDICINE

## 2022-10-12 PROCEDURE — 3074F SYST BP LT 130 MM HG: CPT | Performed by: FAMILY MEDICINE

## 2022-10-12 PROCEDURE — 3008F BODY MASS INDEX DOCD: CPT | Performed by: FAMILY MEDICINE

## 2022-10-12 PROCEDURE — 11403 EXC TR-EXT B9+MARG 2.1-3CM: CPT | Performed by: FAMILY MEDICINE

## 2022-10-13 ENCOUNTER — OFFICE VISIT (OUTPATIENT)
Dept: PHYSICAL THERAPY | Age: 62
End: 2022-10-13
Attending: Other
Payer: COMMERCIAL

## 2022-10-13 PROCEDURE — 97116 GAIT TRAINING THERAPY: CPT | Performed by: PHYSICAL THERAPIST

## 2022-10-13 PROCEDURE — 97110 THERAPEUTIC EXERCISES: CPT | Performed by: PHYSICAL THERAPIST

## 2022-10-18 ENCOUNTER — TELEPHONE (OUTPATIENT)
Dept: PHYSICAL THERAPY | Facility: HOSPITAL | Age: 62
End: 2022-10-18

## 2022-10-20 ENCOUNTER — TELEPHONE (OUTPATIENT)
Dept: PHYSICAL THERAPY | Facility: HOSPITAL | Age: 62
End: 2022-10-20

## 2022-10-25 ENCOUNTER — OFFICE VISIT (OUTPATIENT)
Dept: FAMILY MEDICINE CLINIC | Facility: CLINIC | Age: 62
End: 2022-10-25
Payer: COMMERCIAL

## 2022-10-25 VITALS
DIASTOLIC BLOOD PRESSURE: 78 MMHG | OXYGEN SATURATION: 99 % | SYSTOLIC BLOOD PRESSURE: 120 MMHG | RESPIRATION RATE: 18 BRPM | WEIGHT: 225 LBS | BODY MASS INDEX: 38.41 KG/M2 | HEIGHT: 64 IN | HEART RATE: 78 BPM

## 2022-10-25 DIAGNOSIS — Z48.02 VISIT FOR SUTURE REMOVAL: Primary | ICD-10-CM

## 2022-10-25 PROCEDURE — 3074F SYST BP LT 130 MM HG: CPT | Performed by: FAMILY MEDICINE

## 2022-10-25 PROCEDURE — 3078F DIAST BP <80 MM HG: CPT | Performed by: FAMILY MEDICINE

## 2022-10-25 PROCEDURE — 90686 IIV4 VACC NO PRSV 0.5 ML IM: CPT | Performed by: FAMILY MEDICINE

## 2022-10-25 PROCEDURE — 90471 IMMUNIZATION ADMIN: CPT | Performed by: FAMILY MEDICINE

## 2022-10-25 PROCEDURE — 3008F BODY MASS INDEX DOCD: CPT | Performed by: FAMILY MEDICINE

## 2022-10-26 ENCOUNTER — OFFICE VISIT (OUTPATIENT)
Dept: PHYSICAL THERAPY | Age: 62
End: 2022-10-26
Attending: Other
Payer: COMMERCIAL

## 2022-10-26 PROCEDURE — 97116 GAIT TRAINING THERAPY: CPT | Performed by: PHYSICAL THERAPIST

## 2022-10-26 PROCEDURE — 97110 THERAPEUTIC EXERCISES: CPT | Performed by: PHYSICAL THERAPIST

## 2022-10-30 ENCOUNTER — IMMUNIZATION (OUTPATIENT)
Dept: LAB | Age: 62
End: 2022-10-30
Attending: EMERGENCY MEDICINE
Payer: COMMERCIAL

## 2022-10-30 DIAGNOSIS — Z23 NEED FOR VACCINATION: Primary | ICD-10-CM

## 2022-10-30 PROCEDURE — 0124A SARSCOV2 VAC BVL 30MCG/0.3ML: CPT

## 2022-11-02 ENCOUNTER — APPOINTMENT (OUTPATIENT)
Dept: PHYSICAL THERAPY | Age: 62
End: 2022-11-02
Attending: Other
Payer: COMMERCIAL

## 2022-11-09 ENCOUNTER — OFFICE VISIT (OUTPATIENT)
Dept: PHYSICAL THERAPY | Age: 62
End: 2022-11-09
Attending: Other
Payer: COMMERCIAL

## 2022-11-09 PROCEDURE — 97110 THERAPEUTIC EXERCISES: CPT | Performed by: PHYSICAL THERAPIST

## 2022-11-09 PROCEDURE — 97116 GAIT TRAINING THERAPY: CPT | Performed by: PHYSICAL THERAPIST

## 2022-11-16 ENCOUNTER — OFFICE VISIT (OUTPATIENT)
Dept: PHYSICAL THERAPY | Age: 62
End: 2022-11-16
Attending: Other
Payer: COMMERCIAL

## 2022-11-16 PROCEDURE — 97110 THERAPEUTIC EXERCISES: CPT | Performed by: PHYSICAL THERAPIST

## 2022-11-16 PROCEDURE — 97140 MANUAL THERAPY 1/> REGIONS: CPT | Performed by: PHYSICAL THERAPIST

## 2022-11-16 PROCEDURE — 97116 GAIT TRAINING THERAPY: CPT | Performed by: PHYSICAL THERAPIST

## 2022-11-28 RX ORDER — GABAPENTIN 300 MG/1
CAPSULE ORAL
Qty: 180 CAPSULE | Refills: 3 | Status: SHIPPED | OUTPATIENT
Start: 2022-11-28

## 2022-12-13 ENCOUNTER — LAB ENCOUNTER (OUTPATIENT)
Dept: LAB | Age: 62
End: 2022-12-13
Attending: FAMILY MEDICINE
Payer: COMMERCIAL

## 2022-12-13 LAB
ALBUMIN SERPL-MCNC: 3.5 G/DL (ref 3.4–5)
ALBUMIN/GLOB SERPL: 1.1 {RATIO} (ref 1–2)
ALP LIVER SERPL-CCNC: 65 U/L
ALT SERPL-CCNC: 9 U/L
ANION GAP SERPL CALC-SCNC: 8 MMOL/L (ref 0–18)
AST SERPL-CCNC: 8 U/L (ref 15–37)
BILIRUB SERPL-MCNC: 0.7 MG/DL (ref 0.1–2)
BUN BLD-MCNC: 13 MG/DL (ref 7–18)
BUN/CREAT SERPL: 15.9 (ref 10–20)
CALCIUM BLD-MCNC: 8.6 MG/DL (ref 8.5–10.1)
CHLORIDE SERPL-SCNC: 109 MMOL/L (ref 98–112)
CHOLEST SERPL-MCNC: 161 MG/DL (ref ?–200)
CO2 SERPL-SCNC: 27 MMOL/L (ref 21–32)
CREAT BLD-MCNC: 0.82 MG/DL
EST. AVERAGE GLUCOSE BLD GHB EST-MCNC: 146 MG/DL (ref 68–126)
FASTING PATIENT LIPID ANSWER: YES
FASTING STATUS PATIENT QL REPORTED: YES
GFR SERPLBLD BASED ON 1.73 SQ M-ARVRAT: 99 ML/MIN/1.73M2 (ref 60–?)
GLOBULIN PLAS-MCNC: 3.1 G/DL (ref 2.8–4.4)
GLUCOSE BLD-MCNC: 156 MG/DL (ref 70–99)
HBA1C MFR BLD: 6.7 % (ref ?–5.7)
HDLC SERPL-MCNC: 63 MG/DL (ref 40–59)
LDLC SERPL CALC-MCNC: 77 MG/DL (ref ?–100)
NONHDLC SERPL-MCNC: 98 MG/DL (ref ?–130)
OSMOLALITY SERPL CALC.SUM OF ELEC: 301 MOSM/KG (ref 275–295)
POTASSIUM SERPL-SCNC: 4.4 MMOL/L (ref 3.5–5.1)
PROT SERPL-MCNC: 6.6 G/DL (ref 6.4–8.2)
SODIUM SERPL-SCNC: 144 MMOL/L (ref 136–145)
TRIGL SERPL-MCNC: 119 MG/DL (ref 30–149)
VLDLC SERPL CALC-MCNC: 18 MG/DL (ref 0–30)

## 2022-12-13 PROCEDURE — 80061 LIPID PANEL: CPT | Performed by: FAMILY MEDICINE

## 2022-12-13 PROCEDURE — 3044F HG A1C LEVEL LT 7.0%: CPT | Performed by: FAMILY MEDICINE

## 2022-12-13 PROCEDURE — 80053 COMPREHEN METABOLIC PANEL: CPT | Performed by: FAMILY MEDICINE

## 2022-12-13 PROCEDURE — 83036 HEMOGLOBIN GLYCOSYLATED A1C: CPT | Performed by: FAMILY MEDICINE

## 2022-12-14 ENCOUNTER — TELEPHONE (OUTPATIENT)
Dept: SURGERY | Facility: CLINIC | Age: 62
End: 2022-12-14

## 2022-12-14 ENCOUNTER — OFFICE VISIT (OUTPATIENT)
Dept: FAMILY MEDICINE CLINIC | Facility: CLINIC | Age: 62
End: 2022-12-14
Payer: COMMERCIAL

## 2022-12-14 VITALS
DIASTOLIC BLOOD PRESSURE: 72 MMHG | SYSTOLIC BLOOD PRESSURE: 114 MMHG | RESPIRATION RATE: 16 BRPM | HEIGHT: 64 IN | HEART RATE: 87 BPM | WEIGHT: 227 LBS | BODY MASS INDEX: 38.76 KG/M2 | OXYGEN SATURATION: 98 %

## 2022-12-14 DIAGNOSIS — I10 ESSENTIAL HYPERTENSION: ICD-10-CM

## 2022-12-14 DIAGNOSIS — Z00.00 GENERAL MEDICAL EXAM: ICD-10-CM

## 2022-12-14 DIAGNOSIS — E11.42 DM TYPE 2 WITH DIABETIC PERIPHERAL NEUROPATHY (HCC): Primary | ICD-10-CM

## 2022-12-14 DIAGNOSIS — Z86.73 HISTORY OF CVA (CEREBROVASCULAR ACCIDENT): ICD-10-CM

## 2022-12-14 PROCEDURE — 3078F DIAST BP <80 MM HG: CPT | Performed by: FAMILY MEDICINE

## 2022-12-14 PROCEDURE — 3074F SYST BP LT 130 MM HG: CPT | Performed by: FAMILY MEDICINE

## 2022-12-14 PROCEDURE — 99214 OFFICE O/P EST MOD 30 MIN: CPT | Performed by: FAMILY MEDICINE

## 2022-12-14 PROCEDURE — 3008F BODY MASS INDEX DOCD: CPT | Performed by: FAMILY MEDICINE

## 2022-12-14 NOTE — TELEPHONE ENCOUNTER
Rec'd med rec req for 1/1/22 to present from Dr Chanel Arambula & Dr Maddie Perea; sent to scan stat; copy sent to scanning

## 2022-12-20 ENCOUNTER — TELEPHONE (OUTPATIENT)
Dept: FAMILY MEDICINE CLINIC | Facility: CLINIC | Age: 62
End: 2022-12-20

## 2022-12-20 NOTE — TELEPHONE ENCOUNTER
RECEIVED:  12/20/22    SENT TO SCAN:  12/20/22    THE [facility] [address] [address]  [place] [address]  [phone]    FROM 1/1/22 TO PRESENT

## 2022-12-21 RX ORDER — PERPHENAZINE 16 MG/1
TABLET, FILM COATED ORAL
Qty: 200 STRIP | Refills: 3 | Status: SHIPPED | OUTPATIENT
Start: 2022-12-21

## 2023-01-10 ENCOUNTER — TELEPHONE (OUTPATIENT)
Dept: FAMILY MEDICINE CLINIC | Facility: CLINIC | Age: 63
End: 2023-01-10

## 2023-01-16 ENCOUNTER — TELEPHONE (OUTPATIENT)
Dept: FAMILY MEDICINE CLINIC | Facility: CLINIC | Age: 63
End: 2023-01-16

## 2023-03-15 ENCOUNTER — OFFICE VISIT (OUTPATIENT)
Dept: FAMILY MEDICINE CLINIC | Facility: CLINIC | Age: 63
End: 2023-03-15
Payer: COMMERCIAL

## 2023-03-15 VITALS
OXYGEN SATURATION: 98 % | DIASTOLIC BLOOD PRESSURE: 68 MMHG | HEIGHT: 64 IN | RESPIRATION RATE: 16 BRPM | SYSTOLIC BLOOD PRESSURE: 128 MMHG | BODY MASS INDEX: 38.41 KG/M2 | HEART RATE: 76 BPM | WEIGHT: 225 LBS

## 2023-03-15 DIAGNOSIS — I10 ESSENTIAL HYPERTENSION: ICD-10-CM

## 2023-03-15 DIAGNOSIS — Z86.73 HISTORY OF CVA (CEREBROVASCULAR ACCIDENT): ICD-10-CM

## 2023-03-15 DIAGNOSIS — Z00.00 ANNUAL PHYSICAL EXAM: Primary | ICD-10-CM

## 2023-03-15 DIAGNOSIS — E11.42 DM TYPE 2 WITH DIABETIC PERIPHERAL NEUROPATHY (HCC): ICD-10-CM

## 2023-03-15 PROCEDURE — 3008F BODY MASS INDEX DOCD: CPT | Performed by: FAMILY MEDICINE

## 2023-03-15 PROCEDURE — 3074F SYST BP LT 130 MM HG: CPT | Performed by: FAMILY MEDICINE

## 2023-03-15 PROCEDURE — 99396 PREV VISIT EST AGE 40-64: CPT | Performed by: FAMILY MEDICINE

## 2023-03-15 PROCEDURE — 99213 OFFICE O/P EST LOW 20 MIN: CPT | Performed by: FAMILY MEDICINE

## 2023-03-15 PROCEDURE — 3078F DIAST BP <80 MM HG: CPT | Performed by: FAMILY MEDICINE

## 2023-03-17 ENCOUNTER — LAB ENCOUNTER (OUTPATIENT)
Dept: LAB | Age: 63
End: 2023-03-17
Attending: FAMILY MEDICINE
Payer: COMMERCIAL

## 2023-03-17 DIAGNOSIS — Z00.00 GENERAL MEDICAL EXAM: ICD-10-CM

## 2023-03-17 LAB
ALBUMIN SERPL-MCNC: 3.6 G/DL (ref 3.4–5)
ALBUMIN/GLOB SERPL: 1.1 {RATIO} (ref 1–2)
ALP LIVER SERPL-CCNC: 57 U/L
ALT SERPL-CCNC: 11 U/L
ANION GAP SERPL CALC-SCNC: 7 MMOL/L (ref 0–18)
AST SERPL-CCNC: 5 U/L (ref 15–37)
BASOPHILS # BLD AUTO: 0.06 X10(3) UL (ref 0–0.2)
BASOPHILS NFR BLD AUTO: 0.9 %
BILIRUB SERPL-MCNC: 0.6 MG/DL (ref 0.1–2)
BUN BLD-MCNC: 12 MG/DL (ref 7–18)
CALCIUM BLD-MCNC: 8.7 MG/DL (ref 8.5–10.1)
CHLORIDE SERPL-SCNC: 111 MMOL/L (ref 98–112)
CHOLEST SERPL-MCNC: 136 MG/DL (ref ?–200)
CO2 SERPL-SCNC: 25 MMOL/L (ref 21–32)
COMPLEXED PSA SERPL-MCNC: 1.42 NG/ML (ref ?–4)
CREAT BLD-MCNC: 0.87 MG/DL
CREAT UR-SCNC: 109 MG/DL
EOSINOPHIL # BLD AUTO: 0.4 X10(3) UL (ref 0–0.7)
EOSINOPHIL NFR BLD AUTO: 6 %
ERYTHROCYTE [DISTWIDTH] IN BLOOD BY AUTOMATED COUNT: 13.7 %
EST. AVERAGE GLUCOSE BLD GHB EST-MCNC: 200 MG/DL (ref 68–126)
FASTING PATIENT LIPID ANSWER: YES
FASTING STATUS PATIENT QL REPORTED: YES
GFR SERPLBLD BASED ON 1.73 SQ M-ARVRAT: 97 ML/MIN/1.73M2 (ref 60–?)
GLOBULIN PLAS-MCNC: 3.2 G/DL (ref 2.8–4.4)
GLUCOSE BLD-MCNC: 160 MG/DL (ref 70–99)
HBA1C MFR BLD: 8.6 % (ref ?–5.7)
HCT VFR BLD AUTO: 45.7 %
HDLC SERPL-MCNC: 55 MG/DL (ref 40–59)
HGB BLD-MCNC: 14.2 G/DL
IMM GRANULOCYTES # BLD AUTO: 0.02 X10(3) UL (ref 0–1)
IMM GRANULOCYTES NFR BLD: 0.3 %
LDLC SERPL CALC-MCNC: 67 MG/DL (ref ?–100)
LYMPHOCYTES # BLD AUTO: 2.03 X10(3) UL (ref 1–4)
LYMPHOCYTES NFR BLD AUTO: 30.5 %
MCH RBC QN AUTO: 27.7 PG (ref 26–34)
MCHC RBC AUTO-ENTMCNC: 31.1 G/DL (ref 31–37)
MCV RBC AUTO: 89.1 FL
MICROALBUMIN UR-MCNC: 1.46 MG/DL
MICROALBUMIN/CREAT 24H UR-RTO: 13.4 UG/MG (ref ?–30)
MONOCYTES # BLD AUTO: 0.5 X10(3) UL (ref 0.1–1)
MONOCYTES NFR BLD AUTO: 7.5 %
NEUTROPHILS # BLD AUTO: 3.64 X10 (3) UL (ref 1.5–7.7)
NEUTROPHILS # BLD AUTO: 3.64 X10(3) UL (ref 1.5–7.7)
NEUTROPHILS NFR BLD AUTO: 54.8 %
NONHDLC SERPL-MCNC: 81 MG/DL (ref ?–130)
OSMOLALITY SERPL CALC.SUM OF ELEC: 299 MOSM/KG (ref 275–295)
PLATELET # BLD AUTO: 227 10(3)UL (ref 150–450)
POTASSIUM SERPL-SCNC: 4.1 MMOL/L (ref 3.5–5.1)
PROT SERPL-MCNC: 6.8 G/DL (ref 6.4–8.2)
RBC # BLD AUTO: 5.13 X10(6)UL
SODIUM SERPL-SCNC: 143 MMOL/L (ref 136–145)
T4 FREE SERPL-MCNC: 1 NG/DL (ref 0.8–1.7)
TRIGL SERPL-MCNC: 66 MG/DL (ref 30–149)
TSI SER-ACNC: 1.76 MIU/ML (ref 0.36–3.74)
VIT B12 SERPL-MCNC: 581 PG/ML (ref 193–986)
VIT D+METAB SERPL-MCNC: 35.8 NG/ML (ref 30–100)
VLDLC SERPL CALC-MCNC: 10 MG/DL (ref 0–30)
WBC # BLD AUTO: 6.7 X10(3) UL (ref 4–11)

## 2023-03-17 PROCEDURE — 82570 ASSAY OF URINE CREATININE: CPT | Performed by: FAMILY MEDICINE

## 2023-03-17 PROCEDURE — 3061F NEG MICROALBUMINURIA REV: CPT | Performed by: FAMILY MEDICINE

## 2023-03-17 PROCEDURE — 3052F HG A1C>EQUAL 8.0%<EQUAL 9.0%: CPT | Performed by: FAMILY MEDICINE

## 2023-03-17 PROCEDURE — 80050 GENERAL HEALTH PANEL: CPT | Performed by: FAMILY MEDICINE

## 2023-03-17 PROCEDURE — 82607 VITAMIN B-12: CPT | Performed by: FAMILY MEDICINE

## 2023-03-17 PROCEDURE — 82306 VITAMIN D 25 HYDROXY: CPT | Performed by: FAMILY MEDICINE

## 2023-03-17 PROCEDURE — 83036 HEMOGLOBIN GLYCOSYLATED A1C: CPT | Performed by: FAMILY MEDICINE

## 2023-03-17 PROCEDURE — 84153 ASSAY OF PSA TOTAL: CPT | Performed by: FAMILY MEDICINE

## 2023-03-17 PROCEDURE — 82043 UR ALBUMIN QUANTITATIVE: CPT | Performed by: FAMILY MEDICINE

## 2023-03-17 PROCEDURE — 80061 LIPID PANEL: CPT | Performed by: FAMILY MEDICINE

## 2023-03-17 PROCEDURE — 84439 ASSAY OF FREE THYROXINE: CPT | Performed by: FAMILY MEDICINE

## 2023-03-22 ENCOUNTER — OFFICE VISIT (OUTPATIENT)
Dept: FAMILY MEDICINE CLINIC | Facility: CLINIC | Age: 63
End: 2023-03-22
Payer: COMMERCIAL

## 2023-03-22 VITALS
OXYGEN SATURATION: 97 % | HEIGHT: 64 IN | WEIGHT: 226 LBS | HEART RATE: 69 BPM | RESPIRATION RATE: 16 BRPM | DIASTOLIC BLOOD PRESSURE: 76 MMHG | SYSTOLIC BLOOD PRESSURE: 104 MMHG | BODY MASS INDEX: 38.58 KG/M2

## 2023-03-22 DIAGNOSIS — R93.1 ELEVATED CORONARY ARTERY CALCIUM SCORE: ICD-10-CM

## 2023-03-22 DIAGNOSIS — E11.42 DM TYPE 2 WITH DIABETIC PERIPHERAL NEUROPATHY (HCC): ICD-10-CM

## 2023-03-22 DIAGNOSIS — Z01.818 PREOP EXAMINATION: Primary | ICD-10-CM

## 2023-03-22 DIAGNOSIS — I10 ESSENTIAL HYPERTENSION: ICD-10-CM

## 2023-03-22 DIAGNOSIS — I69.354 HEMIPARESIS AFFECTING LEFT SIDE AS LATE EFFECT OF STROKE (HCC): ICD-10-CM

## 2023-03-22 DIAGNOSIS — H25.9 SENILE CATARACT OF RIGHT EYE, UNSPECIFIED AGE-RELATED CATARACT TYPE: ICD-10-CM

## 2023-03-22 PROCEDURE — 99243 OFF/OP CNSLTJ NEW/EST LOW 30: CPT | Performed by: FAMILY MEDICINE

## 2023-03-22 PROCEDURE — 3074F SYST BP LT 130 MM HG: CPT | Performed by: FAMILY MEDICINE

## 2023-03-22 PROCEDURE — 3078F DIAST BP <80 MM HG: CPT | Performed by: FAMILY MEDICINE

## 2023-03-22 PROCEDURE — 3008F BODY MASS INDEX DOCD: CPT | Performed by: FAMILY MEDICINE

## 2023-03-23 ENCOUNTER — OFFICE VISIT (OUTPATIENT)
Dept: NEUROLOGY | Facility: CLINIC | Age: 63
End: 2023-03-23
Payer: COMMERCIAL

## 2023-03-23 VITALS
HEART RATE: 70 BPM | SYSTOLIC BLOOD PRESSURE: 118 MMHG | BODY MASS INDEX: 39 KG/M2 | RESPIRATION RATE: 16 BRPM | WEIGHT: 225.81 LBS | DIASTOLIC BLOOD PRESSURE: 64 MMHG

## 2023-03-23 DIAGNOSIS — I69.354 HEMIPARESIS AFFECTING LEFT SIDE AS LATE EFFECT OF STROKE (HCC): ICD-10-CM

## 2023-03-23 DIAGNOSIS — R26.9 GAIT ABNORMALITY: ICD-10-CM

## 2023-03-23 DIAGNOSIS — G44.229 CHRONIC TENSION-TYPE HEADACHE, NOT INTRACTABLE: Primary | ICD-10-CM

## 2023-03-23 DIAGNOSIS — Z86.73 HISTORY OF STROKE: ICD-10-CM

## 2023-03-23 PROCEDURE — 3074F SYST BP LT 130 MM HG: CPT | Performed by: OTHER

## 2023-03-23 PROCEDURE — 99213 OFFICE O/P EST LOW 20 MIN: CPT | Performed by: OTHER

## 2023-03-23 PROCEDURE — 3078F DIAST BP <80 MM HG: CPT | Performed by: OTHER

## 2023-04-03 ENCOUNTER — TELEPHONE (OUTPATIENT)
Dept: FAMILY MEDICINE CLINIC | Facility: CLINIC | Age: 63
End: 2023-04-03

## 2023-04-25 ENCOUNTER — TELEPHONE (OUTPATIENT)
Dept: FAMILY MEDICINE CLINIC | Facility: CLINIC | Age: 63
End: 2023-04-25

## 2023-04-25 NOTE — TELEPHONE ENCOUNTER
RECEIVED:  4/24/23    SENT TO SCAN: 4/25/23    EMAIL  [email or web address]    Office notes for period 1/1/23 to present

## 2023-05-04 ENCOUNTER — TELEPHONE (OUTPATIENT)
Dept: PHYSICAL THERAPY | Facility: HOSPITAL | Age: 63
End: 2023-05-04

## 2023-05-05 DIAGNOSIS — E11.42 DM TYPE 2 WITH DIABETIC PERIPHERAL NEUROPATHY (HCC): ICD-10-CM

## 2023-05-05 DIAGNOSIS — I69.354 HEMIPARESIS AFFECTING LEFT SIDE AS LATE EFFECT OF STROKE (HCC): ICD-10-CM

## 2023-05-06 RX ORDER — SIMVASTATIN 40 MG
TABLET ORAL
Qty: 90 TABLET | Refills: 3 | Status: SHIPPED | OUTPATIENT
Start: 2023-05-06

## 2023-05-09 ENCOUNTER — OFFICE VISIT (OUTPATIENT)
Dept: PHYSICAL THERAPY | Age: 63
End: 2023-05-09
Attending: Other
Payer: COMMERCIAL

## 2023-05-09 DIAGNOSIS — I69.354 HEMIPARESIS AFFECTING LEFT SIDE AS LATE EFFECT OF STROKE (HCC): ICD-10-CM

## 2023-05-09 PROCEDURE — 97110 THERAPEUTIC EXERCISES: CPT | Performed by: PHYSICAL THERAPIST

## 2023-05-09 PROCEDURE — 97163 PT EVAL HIGH COMPLEX 45 MIN: CPT | Performed by: PHYSICAL THERAPIST

## 2023-05-12 ENCOUNTER — OFFICE VISIT (OUTPATIENT)
Dept: PHYSICAL THERAPY | Age: 63
End: 2023-05-12
Attending: Other
Payer: COMMERCIAL

## 2023-05-12 PROCEDURE — 97140 MANUAL THERAPY 1/> REGIONS: CPT | Performed by: PHYSICAL THERAPIST

## 2023-05-12 PROCEDURE — 97110 THERAPEUTIC EXERCISES: CPT | Performed by: PHYSICAL THERAPIST

## 2023-05-16 ENCOUNTER — OFFICE VISIT (OUTPATIENT)
Dept: PHYSICAL THERAPY | Age: 63
End: 2023-05-16
Attending: Other
Payer: COMMERCIAL

## 2023-05-16 PROCEDURE — 97140 MANUAL THERAPY 1/> REGIONS: CPT | Performed by: PHYSICAL THERAPIST

## 2023-05-16 PROCEDURE — 97110 THERAPEUTIC EXERCISES: CPT | Performed by: PHYSICAL THERAPIST

## 2023-05-23 ENCOUNTER — OFFICE VISIT (OUTPATIENT)
Dept: PHYSICAL THERAPY | Age: 63
End: 2023-05-23
Attending: Other
Payer: COMMERCIAL

## 2023-05-23 PROCEDURE — 97110 THERAPEUTIC EXERCISES: CPT | Performed by: PHYSICAL THERAPIST

## 2023-05-23 PROCEDURE — 97140 MANUAL THERAPY 1/> REGIONS: CPT | Performed by: PHYSICAL THERAPIST

## 2023-05-25 DIAGNOSIS — I10 ESSENTIAL HYPERTENSION: ICD-10-CM

## 2023-05-26 ENCOUNTER — TELEPHONE (OUTPATIENT)
Dept: NEUROLOGY | Facility: CLINIC | Age: 63
End: 2023-05-26

## 2023-05-26 ENCOUNTER — OFFICE VISIT (OUTPATIENT)
Dept: PHYSICAL THERAPY | Age: 63
End: 2023-05-26
Attending: Other
Payer: COMMERCIAL

## 2023-05-26 PROCEDURE — 97110 THERAPEUTIC EXERCISES: CPT | Performed by: PHYSICAL THERAPIST

## 2023-05-26 PROCEDURE — 97116 GAIT TRAINING THERAPY: CPT | Performed by: PHYSICAL THERAPIST

## 2023-05-26 RX ORDER — AMLODIPINE BESYLATE 10 MG/1
TABLET ORAL
Qty: 90 TABLET | Refills: 3 | Status: SHIPPED | OUTPATIENT
Start: 2023-05-26

## 2023-05-26 RX ORDER — LISINOPRIL 40 MG/1
TABLET ORAL
Qty: 90 TABLET | Refills: 3 | Status: SHIPPED | OUTPATIENT
Start: 2023-05-26

## 2023-05-26 NOTE — TELEPHONE ENCOUNTER
Patient dropped of Medical Records Request from [facility]- need 5/1/22 records to present for evaluation of Long Term Disability faxed to the [facility], [phone]. Patient included NITHIN with paper work. Medical records requested faxed to [facility] with fax confirmation received. Medical records request with NITHIN sent to scanning.

## 2023-05-31 ENCOUNTER — OFFICE VISIT (OUTPATIENT)
Dept: PHYSICAL THERAPY | Age: 63
End: 2023-05-31
Attending: Other
Payer: COMMERCIAL

## 2023-05-31 PROCEDURE — 97140 MANUAL THERAPY 1/> REGIONS: CPT | Performed by: PHYSICAL THERAPIST

## 2023-05-31 PROCEDURE — 97110 THERAPEUTIC EXERCISES: CPT | Performed by: PHYSICAL THERAPIST

## 2023-05-31 PROCEDURE — 97116 GAIT TRAINING THERAPY: CPT | Performed by: PHYSICAL THERAPIST

## 2023-06-02 ENCOUNTER — OFFICE VISIT (OUTPATIENT)
Dept: PHYSICAL THERAPY | Age: 63
End: 2023-06-02
Attending: Other
Payer: COMMERCIAL

## 2023-06-02 PROCEDURE — 97140 MANUAL THERAPY 1/> REGIONS: CPT | Performed by: PHYSICAL THERAPIST

## 2023-06-02 PROCEDURE — 97110 THERAPEUTIC EXERCISES: CPT | Performed by: PHYSICAL THERAPIST

## 2023-06-02 PROCEDURE — 97116 GAIT TRAINING THERAPY: CPT | Performed by: PHYSICAL THERAPIST

## 2023-06-07 ENCOUNTER — OFFICE VISIT (OUTPATIENT)
Dept: PHYSICAL THERAPY | Age: 63
End: 2023-06-07
Attending: Other
Payer: COMMERCIAL

## 2023-06-07 PROCEDURE — 97116 GAIT TRAINING THERAPY: CPT | Performed by: PHYSICAL THERAPIST

## 2023-06-07 PROCEDURE — 97110 THERAPEUTIC EXERCISES: CPT | Performed by: PHYSICAL THERAPIST

## 2023-06-09 ENCOUNTER — LAB ENCOUNTER (OUTPATIENT)
Dept: LAB | Age: 63
End: 2023-06-09
Attending: FAMILY MEDICINE
Payer: COMMERCIAL

## 2023-06-09 LAB
ALBUMIN SERPL-MCNC: 3.7 G/DL (ref 3.4–5)
ALBUMIN/GLOB SERPL: 1 {RATIO} (ref 1–2)
ALP LIVER SERPL-CCNC: 64 U/L
ALT SERPL-CCNC: 9 U/L
ANION GAP SERPL CALC-SCNC: 1 MMOL/L (ref 0–18)
AST SERPL-CCNC: 14 U/L (ref 15–37)
BILIRUB SERPL-MCNC: 0.6 MG/DL (ref 0.1–2)
BUN BLD-MCNC: 11 MG/DL (ref 7–18)
CALCIUM BLD-MCNC: 8.6 MG/DL (ref 8.5–10.1)
CHLORIDE SERPL-SCNC: 110 MMOL/L (ref 98–112)
CHOLEST SERPL-MCNC: 135 MG/DL (ref ?–200)
CO2 SERPL-SCNC: 27 MMOL/L (ref 21–32)
CREAT BLD-MCNC: 0.75 MG/DL
EST. AVERAGE GLUCOSE BLD GHB EST-MCNC: 146 MG/DL (ref 68–126)
FASTING PATIENT LIPID ANSWER: YES
FASTING STATUS PATIENT QL REPORTED: YES
GFR SERPLBLD BASED ON 1.73 SQ M-ARVRAT: 101 ML/MIN/1.73M2 (ref 60–?)
GLOBULIN PLAS-MCNC: 3.7 G/DL (ref 2.8–4.4)
GLUCOSE BLD-MCNC: 147 MG/DL (ref 70–99)
HBA1C MFR BLD: 6.7 % (ref ?–5.7)
HDLC SERPL-MCNC: 65 MG/DL (ref 40–59)
LDLC SERPL CALC-MCNC: 57 MG/DL (ref ?–100)
NONHDLC SERPL-MCNC: 70 MG/DL (ref ?–130)
OSMOLALITY SERPL CALC.SUM OF ELEC: 288 MOSM/KG (ref 275–295)
POTASSIUM SERPL-SCNC: 4.3 MMOL/L (ref 3.5–5.1)
PROT SERPL-MCNC: 7.4 G/DL (ref 6.4–8.2)
SODIUM SERPL-SCNC: 138 MMOL/L (ref 136–145)
TRIGL SERPL-MCNC: 59 MG/DL (ref 30–149)
VLDLC SERPL CALC-MCNC: 9 MG/DL (ref 0–30)

## 2023-06-09 PROCEDURE — 80053 COMPREHEN METABOLIC PANEL: CPT | Performed by: FAMILY MEDICINE

## 2023-06-09 PROCEDURE — 80061 LIPID PANEL: CPT | Performed by: FAMILY MEDICINE

## 2023-06-09 PROCEDURE — 3044F HG A1C LEVEL LT 7.0%: CPT | Performed by: FAMILY MEDICINE

## 2023-06-09 PROCEDURE — 83036 HEMOGLOBIN GLYCOSYLATED A1C: CPT | Performed by: FAMILY MEDICINE

## 2023-06-14 ENCOUNTER — OFFICE VISIT (OUTPATIENT)
Dept: FAMILY MEDICINE CLINIC | Facility: CLINIC | Age: 63
End: 2023-06-14
Payer: COMMERCIAL

## 2023-06-14 VITALS
HEIGHT: 64 IN | HEART RATE: 64 BPM | WEIGHT: 225 LBS | RESPIRATION RATE: 16 BRPM | BODY MASS INDEX: 38.41 KG/M2 | SYSTOLIC BLOOD PRESSURE: 116 MMHG | OXYGEN SATURATION: 96 % | DIASTOLIC BLOOD PRESSURE: 62 MMHG

## 2023-06-14 DIAGNOSIS — R93.1 ELEVATED CORONARY ARTERY CALCIUM SCORE: ICD-10-CM

## 2023-06-14 DIAGNOSIS — Z86.73 HISTORY OF CVA (CEREBROVASCULAR ACCIDENT): ICD-10-CM

## 2023-06-14 DIAGNOSIS — I10 ESSENTIAL HYPERTENSION: ICD-10-CM

## 2023-06-14 DIAGNOSIS — I69.354 HEMIPARESIS AFFECTING LEFT SIDE AS LATE EFFECT OF STROKE (HCC): ICD-10-CM

## 2023-06-14 DIAGNOSIS — R06.83 SNORING: ICD-10-CM

## 2023-06-14 DIAGNOSIS — E11.42 DM TYPE 2 WITH DIABETIC PERIPHERAL NEUROPATHY (HCC): Primary | ICD-10-CM

## 2023-06-14 PROCEDURE — 3008F BODY MASS INDEX DOCD: CPT | Performed by: FAMILY MEDICINE

## 2023-06-14 PROCEDURE — 3078F DIAST BP <80 MM HG: CPT | Performed by: FAMILY MEDICINE

## 2023-06-14 PROCEDURE — 99214 OFFICE O/P EST MOD 30 MIN: CPT | Performed by: FAMILY MEDICINE

## 2023-06-14 PROCEDURE — 3074F SYST BP LT 130 MM HG: CPT | Performed by: FAMILY MEDICINE

## 2023-06-14 RX ORDER — SEMAGLUTIDE 0.68 MG/ML
0.25 INJECTION, SOLUTION SUBCUTANEOUS WEEKLY
Qty: 1 EACH | Refills: 0 | Status: SHIPPED | OUTPATIENT
Start: 2023-06-14

## 2023-06-27 ENCOUNTER — APPOINTMENT (OUTPATIENT)
Dept: PHYSICAL THERAPY | Age: 63
End: 2023-06-27
Attending: FAMILY MEDICINE
Payer: COMMERCIAL

## 2023-06-30 ENCOUNTER — OFFICE VISIT (OUTPATIENT)
Dept: PHYSICAL THERAPY | Age: 63
End: 2023-06-30
Attending: FAMILY MEDICINE
Payer: COMMERCIAL

## 2023-06-30 PROCEDURE — 97140 MANUAL THERAPY 1/> REGIONS: CPT | Performed by: PHYSICAL THERAPIST

## 2023-06-30 PROCEDURE — 97110 THERAPEUTIC EXERCISES: CPT | Performed by: PHYSICAL THERAPIST

## 2023-06-30 PROCEDURE — 97116 GAIT TRAINING THERAPY: CPT | Performed by: PHYSICAL THERAPIST

## 2023-07-03 ENCOUNTER — TELEPHONE (OUTPATIENT)
Dept: FAMILY MEDICINE CLINIC | Facility: CLINIC | Age: 63
End: 2023-07-03

## 2023-07-05 ENCOUNTER — NURSE ONLY (OUTPATIENT)
Dept: FAMILY MEDICINE CLINIC | Facility: CLINIC | Age: 63
End: 2023-07-05
Payer: COMMERCIAL

## 2023-07-05 NOTE — PROGRESS NOTES
Patient presents to clinic for Ozempic injection training, patient has brought his own medication from home.  instructions reviewed, insert shown to patient to refer back to if needed for future injections, advised patient he can come back again if more education needed. Instructions reviewed with patient on approved injection sites. Skin cleaned, injection done by patient on left posterior arm site per  instruction with correct technique. Patient will provide update after the 4th week.

## 2023-07-24 ENCOUNTER — TELEPHONE (OUTPATIENT)
Dept: FAMILY MEDICINE CLINIC | Facility: CLINIC | Age: 63
End: 2023-07-24

## 2023-07-24 NOTE — TELEPHONE ENCOUNTER
REVIEWING DISABILITY CLAIM AND NEEDS MORE INFO ON WHY PATIENT CAN NOT WORK. PLS CALL BACK TO [facility].

## 2023-08-01 ENCOUNTER — APPOINTMENT (OUTPATIENT)
Dept: CT IMAGING | Facility: HOSPITAL | Age: 63
End: 2023-08-01
Attending: EMERGENCY MEDICINE
Payer: COMMERCIAL

## 2023-08-01 ENCOUNTER — HOSPITAL ENCOUNTER (EMERGENCY)
Facility: HOSPITAL | Age: 63
Discharge: HOME | End: 2023-08-01
Attending: EMERGENCY MEDICINE
Payer: COMMERCIAL

## 2023-08-01 VITALS
DIASTOLIC BLOOD PRESSURE: 70 MMHG | TEMPERATURE: 98 F | WEIGHT: 220 LBS | HEART RATE: 89 BPM | OXYGEN SATURATION: 96 % | HEIGHT: 64 IN | SYSTOLIC BLOOD PRESSURE: 112 MMHG | BODY MASS INDEX: 37.56 KG/M2 | RESPIRATION RATE: 17 BRPM

## 2023-08-01 DIAGNOSIS — N30.00 ACUTE CYSTITIS WITHOUT HEMATURIA: Primary | ICD-10-CM

## 2023-08-01 LAB
ALBUMIN SERPL-MCNC: 3.6 G/DL (ref 3.4–5)
ALBUMIN/GLOB SERPL: 0.9 {RATIO} (ref 1–2)
ALP LIVER SERPL-CCNC: 66 U/L
ALT SERPL-CCNC: 19 U/L
ANION GAP SERPL CALC-SCNC: 6 MMOL/L (ref 0–18)
AST SERPL-CCNC: 32 U/L (ref 15–37)
ATRIAL RATE: 103 BPM
BASOPHILS # BLD AUTO: 0.03 X10(3) UL (ref 0–0.2)
BASOPHILS NFR BLD AUTO: 0.4 %
BILIRUB SERPL-MCNC: 1.4 MG/DL (ref 0.1–2)
BILIRUB UR QL STRIP.AUTO: NEGATIVE
BUN BLD-MCNC: 9 MG/DL (ref 7–18)
CALCIUM BLD-MCNC: 8.7 MG/DL (ref 8.5–10.1)
CHLORIDE SERPL-SCNC: 105 MMOL/L (ref 98–112)
CO2 SERPL-SCNC: 24 MMOL/L (ref 21–32)
COLOR UR AUTO: YELLOW
CREAT BLD-MCNC: 0.94 MG/DL
EGFRCR SERPLBLD CKD-EPI 2021: 91 ML/MIN/1.73M2 (ref 60–?)
EOSINOPHIL # BLD AUTO: 0 X10(3) UL (ref 0–0.7)
EOSINOPHIL NFR BLD AUTO: 0 %
ERYTHROCYTE [DISTWIDTH] IN BLOOD BY AUTOMATED COUNT: 13.4 %
GLOBULIN PLAS-MCNC: 4 G/DL (ref 2.8–4.4)
GLUCOSE BLD-MCNC: 183 MG/DL (ref 70–99)
GLUCOSE BLD-MCNC: 208 MG/DL (ref 70–99)
GLUCOSE UR STRIP.AUTO-MCNC: 50 MG/DL
HCT VFR BLD AUTO: 47.2 %
HGB BLD-MCNC: 15.1 G/DL
IMM GRANULOCYTES # BLD AUTO: 0.03 X10(3) UL (ref 0–1)
IMM GRANULOCYTES NFR BLD: 0.4 %
KETONES UR STRIP.AUTO-MCNC: 20 MG/DL
LACTATE SERPL-SCNC: 1.7 MMOL/L (ref 0.4–2)
LYMPHOCYTES # BLD AUTO: 0.47 X10(3) UL (ref 1–4)
LYMPHOCYTES NFR BLD AUTO: 5.8 %
MAGNESIUM SERPL-MCNC: 1.7 MG/DL (ref 1.6–2.6)
MCH RBC QN AUTO: 28.2 PG (ref 26–34)
MCHC RBC AUTO-ENTMCNC: 32 G/DL (ref 31–37)
MCV RBC AUTO: 88.1 FL
MONOCYTES # BLD AUTO: 0.16 X10(3) UL (ref 0.1–1)
MONOCYTES NFR BLD AUTO: 2 %
NEUTROPHILS # BLD AUTO: 7.35 X10 (3) UL (ref 1.5–7.7)
NEUTROPHILS # BLD AUTO: 7.35 X10(3) UL (ref 1.5–7.7)
NEUTROPHILS NFR BLD AUTO: 91.4 %
NITRITE UR QL STRIP.AUTO: POSITIVE
OSMOLALITY SERPL CALC.SUM OF ELEC: 285 MOSM/KG (ref 275–295)
P AXIS: 57 DEGREES
P-R INTERVAL: 142 MS
PH UR STRIP.AUTO: 5 [PH] (ref 5–8)
PLATELET # BLD AUTO: 124 10(3)UL (ref 150–450)
POTASSIUM SERPL-SCNC: 4.2 MMOL/L (ref 3.5–5.1)
PROT SERPL-MCNC: 7.6 G/DL (ref 6.4–8.2)
PROT UR STRIP.AUTO-MCNC: 30 MG/DL
Q-T INTERVAL: 350 MS
QRS DURATION: 86 MS
QTC CALCULATION (BEZET): 458 MS
R AXIS: 12 DEGREES
RBC # BLD AUTO: 5.36 X10(6)UL
SARS-COV-2 RNA RESP QL NAA+PROBE: NOT DETECTED
SODIUM SERPL-SCNC: 135 MMOL/L (ref 136–145)
SP GR UR STRIP.AUTO: 1.02 (ref 1–1.03)
T AXIS: 7 DEGREES
UROBILINOGEN UR STRIP.AUTO-MCNC: 2 MG/DL
VENTRICULAR RATE: 103 BPM
WBC # BLD AUTO: 8 X10(3) UL (ref 4–11)

## 2023-08-01 PROCEDURE — 81001 URINALYSIS AUTO W/SCOPE: CPT | Performed by: EMERGENCY MEDICINE

## 2023-08-01 PROCEDURE — 83735 ASSAY OF MAGNESIUM: CPT | Performed by: EMERGENCY MEDICINE

## 2023-08-01 PROCEDURE — 99285 EMERGENCY DEPT VISIT HI MDM: CPT

## 2023-08-01 PROCEDURE — 87186 SC STD MICRODIL/AGAR DIL: CPT | Performed by: EMERGENCY MEDICINE

## 2023-08-01 PROCEDURE — 87086 URINE CULTURE/COLONY COUNT: CPT | Performed by: EMERGENCY MEDICINE

## 2023-08-01 PROCEDURE — 74177 CT ABD & PELVIS W/CONTRAST: CPT | Performed by: EMERGENCY MEDICINE

## 2023-08-01 PROCEDURE — 87077 CULTURE AEROBIC IDENTIFY: CPT | Performed by: EMERGENCY MEDICINE

## 2023-08-01 PROCEDURE — 83605 ASSAY OF LACTIC ACID: CPT

## 2023-08-01 PROCEDURE — 85025 COMPLETE CBC W/AUTO DIFF WBC: CPT | Performed by: EMERGENCY MEDICINE

## 2023-08-01 PROCEDURE — 82962 GLUCOSE BLOOD TEST: CPT

## 2023-08-01 PROCEDURE — 70450 CT HEAD/BRAIN W/O DYE: CPT | Performed by: EMERGENCY MEDICINE

## 2023-08-01 PROCEDURE — 93005 ELECTROCARDIOGRAM TRACING: CPT

## 2023-08-01 PROCEDURE — 85025 COMPLETE CBC W/AUTO DIFF WBC: CPT

## 2023-08-01 PROCEDURE — 96361 HYDRATE IV INFUSION ADD-ON: CPT

## 2023-08-01 PROCEDURE — 87040 BLOOD CULTURE FOR BACTERIA: CPT

## 2023-08-01 PROCEDURE — 80053 COMPREHEN METABOLIC PANEL: CPT | Performed by: EMERGENCY MEDICINE

## 2023-08-01 PROCEDURE — 36415 COLL VENOUS BLD VENIPUNCTURE: CPT

## 2023-08-01 PROCEDURE — 83605 ASSAY OF LACTIC ACID: CPT | Performed by: EMERGENCY MEDICINE

## 2023-08-01 PROCEDURE — 87040 BLOOD CULTURE FOR BACTERIA: CPT | Performed by: EMERGENCY MEDICINE

## 2023-08-01 PROCEDURE — 93010 ELECTROCARDIOGRAM REPORT: CPT

## 2023-08-01 PROCEDURE — 80053 COMPREHEN METABOLIC PANEL: CPT

## 2023-08-01 PROCEDURE — 96365 THER/PROPH/DIAG IV INF INIT: CPT

## 2023-08-01 RX ORDER — CEFADROXIL 500 MG/1
500 CAPSULE ORAL 2 TIMES DAILY
Qty: 20 CAPSULE | Refills: 0 | Status: SHIPPED | OUTPATIENT
Start: 2023-08-01 | End: 2023-08-01

## 2023-08-01 RX ORDER — CEFADROXIL 500 MG/1
500 CAPSULE ORAL 2 TIMES DAILY
Qty: 28 CAPSULE | Refills: 0 | Status: SHIPPED | OUTPATIENT
Start: 2023-08-01 | End: 2023-08-15

## 2023-08-01 RX ORDER — ACETAMINOPHEN 500 MG
1000 TABLET ORAL ONCE
Status: COMPLETED | OUTPATIENT
Start: 2023-08-01 | End: 2023-08-01

## 2023-08-01 NOTE — ED INITIAL ASSESSMENT (HPI)
Pt c/o weakness to both legs since yesterday, back pain, urinary frequency. He denies dysuria, no hematuria.  Pt denies abd pain, denies n/v, denies problems with bowels

## 2023-08-02 ENCOUNTER — TELEPHONE (OUTPATIENT)
Dept: NEUROLOGY | Facility: CLINIC | Age: 63
End: 2023-08-02

## 2023-08-02 NOTE — TELEPHONE ENCOUNTER
Rcvd fax from [facility] for provider review and completion; Paperwork placed in Nursing bin for .

## 2023-08-02 NOTE — TELEPHONE ENCOUNTER
Received paperwork from the [facility] for review by provider and response requested. Endorsed to provider for review.

## 2023-08-07 ENCOUNTER — OFFICE VISIT (OUTPATIENT)
Dept: FAMILY MEDICINE CLINIC | Facility: CLINIC | Age: 63
End: 2023-08-07
Payer: COMMERCIAL

## 2023-08-07 VITALS
HEIGHT: 64 IN | OXYGEN SATURATION: 98 % | DIASTOLIC BLOOD PRESSURE: 68 MMHG | RESPIRATION RATE: 16 BRPM | SYSTOLIC BLOOD PRESSURE: 128 MMHG | BODY MASS INDEX: 37.56 KG/M2 | WEIGHT: 220 LBS | HEART RATE: 78 BPM

## 2023-08-07 DIAGNOSIS — I69.354 HEMIPARESIS AFFECTING LEFT SIDE AS LATE EFFECT OF STROKE (HCC): ICD-10-CM

## 2023-08-07 DIAGNOSIS — E11.42 DM TYPE 2 WITH DIABETIC PERIPHERAL NEUROPATHY (HCC): Primary | ICD-10-CM

## 2023-08-07 DIAGNOSIS — N30.01 ACUTE CYSTITIS WITH HEMATURIA: ICD-10-CM

## 2023-08-07 DIAGNOSIS — N20.0 KIDNEY STONES: ICD-10-CM

## 2023-08-07 RX ORDER — SEMAGLUTIDE 0.68 MG/ML
0.25 INJECTION, SOLUTION SUBCUTANEOUS WEEKLY
Qty: 1 EACH | Refills: 1 | Status: SHIPPED | OUTPATIENT
Start: 2023-08-07

## 2023-08-14 RX ORDER — EMPAGLIFLOZIN 10 MG/1
TABLET, FILM COATED ORAL
Qty: 90 TABLET | Refills: 3 | Status: SHIPPED | OUTPATIENT
Start: 2023-08-14

## 2023-08-21 NOTE — TELEPHONE ENCOUNTER
Paperwork signed by Dr. Qiana Guardado and faxed back to [facility] regarding review of consultation. Endorsed to scanning.

## 2023-09-07 ENCOUNTER — LAB ENCOUNTER (OUTPATIENT)
Dept: LAB | Age: 63
End: 2023-09-07
Attending: FAMILY MEDICINE
Payer: COMMERCIAL

## 2023-09-07 LAB
ALBUMIN SERPL-MCNC: 3.6 G/DL (ref 3.4–5)
ALBUMIN/GLOB SERPL: 1.1 {RATIO} (ref 1–2)
ALP LIVER SERPL-CCNC: 59 U/L
ALT SERPL-CCNC: 10 U/L
ANION GAP SERPL CALC-SCNC: 6 MMOL/L (ref 0–18)
AST SERPL-CCNC: 9 U/L (ref 15–37)
BILIRUB SERPL-MCNC: 0.8 MG/DL (ref 0.1–2)
BILIRUB UR QL STRIP.AUTO: NEGATIVE
BUN BLD-MCNC: 12 MG/DL (ref 7–18)
CALCIUM BLD-MCNC: 8.6 MG/DL (ref 8.5–10.1)
CHLORIDE SERPL-SCNC: 110 MMOL/L (ref 98–112)
CHOLEST SERPL-MCNC: 130 MG/DL (ref ?–200)
CLARITY UR REFRACT.AUTO: CLEAR
CO2 SERPL-SCNC: 25 MMOL/L (ref 21–32)
COLOR UR AUTO: YELLOW
CREAT BLD-MCNC: 0.85 MG/DL
EGFRCR SERPLBLD CKD-EPI 2021: 98 ML/MIN/1.73M2 (ref 60–?)
EST. AVERAGE GLUCOSE BLD GHB EST-MCNC: 137 MG/DL (ref 68–126)
FASTING PATIENT LIPID ANSWER: YES
FASTING STATUS PATIENT QL REPORTED: YES
GLOBULIN PLAS-MCNC: 3.4 G/DL (ref 2.8–4.4)
GLUCOSE BLD-MCNC: 137 MG/DL (ref 70–99)
GLUCOSE UR STRIP.AUTO-MCNC: NORMAL MG/DL
HBA1C MFR BLD: 6.4 % (ref ?–5.7)
HDLC SERPL-MCNC: 50 MG/DL (ref 40–59)
KETONES UR STRIP.AUTO-MCNC: NEGATIVE MG/DL
LDLC SERPL CALC-MCNC: 63 MG/DL (ref ?–100)
LEUKOCYTE ESTERASE UR QL STRIP.AUTO: NEGATIVE
NITRITE UR QL STRIP.AUTO: NEGATIVE
NONHDLC SERPL-MCNC: 80 MG/DL (ref ?–130)
OSMOLALITY SERPL CALC.SUM OF ELEC: 294 MOSM/KG (ref 275–295)
PH UR STRIP.AUTO: 5 [PH] (ref 5–8)
POTASSIUM SERPL-SCNC: 3.9 MMOL/L (ref 3.5–5.1)
PROT SERPL-MCNC: 7 G/DL (ref 6.4–8.2)
RBC UR QL AUTO: NEGATIVE
SODIUM SERPL-SCNC: 141 MMOL/L (ref 136–145)
SP GR UR STRIP.AUTO: 1.02 (ref 1–1.03)
TRIGL SERPL-MCNC: 87 MG/DL (ref 30–149)
UROBILINOGEN UR STRIP.AUTO-MCNC: NORMAL MG/DL
VLDLC SERPL CALC-MCNC: 13 MG/DL (ref 0–30)

## 2023-09-07 PROCEDURE — 83036 HEMOGLOBIN GLYCOSYLATED A1C: CPT | Performed by: FAMILY MEDICINE

## 2023-09-07 PROCEDURE — 87086 URINE CULTURE/COLONY COUNT: CPT | Performed by: FAMILY MEDICINE

## 2023-09-07 PROCEDURE — 3044F HG A1C LEVEL LT 7.0%: CPT | Performed by: FAMILY MEDICINE

## 2023-09-07 PROCEDURE — 81001 URINALYSIS AUTO W/SCOPE: CPT | Performed by: FAMILY MEDICINE

## 2023-09-07 PROCEDURE — 80053 COMPREHEN METABOLIC PANEL: CPT | Performed by: FAMILY MEDICINE

## 2023-09-07 PROCEDURE — 80061 LIPID PANEL: CPT | Performed by: FAMILY MEDICINE

## 2023-09-20 ENCOUNTER — OFFICE VISIT (OUTPATIENT)
Dept: FAMILY MEDICINE CLINIC | Facility: CLINIC | Age: 63
End: 2023-09-20
Payer: COMMERCIAL

## 2023-09-20 ENCOUNTER — TELEPHONE (OUTPATIENT)
Dept: FAMILY MEDICINE CLINIC | Facility: CLINIC | Age: 63
End: 2023-09-20

## 2023-09-20 VITALS
DIASTOLIC BLOOD PRESSURE: 74 MMHG | SYSTOLIC BLOOD PRESSURE: 136 MMHG | RESPIRATION RATE: 16 BRPM | OXYGEN SATURATION: 95 % | WEIGHT: 225 LBS | HEART RATE: 68 BPM | HEIGHT: 64 IN | BODY MASS INDEX: 38.41 KG/M2

## 2023-09-20 DIAGNOSIS — E11.42 DM TYPE 2 WITH DIABETIC PERIPHERAL NEUROPATHY (HCC): Primary | ICD-10-CM

## 2023-09-20 DIAGNOSIS — Z23 NEED FOR VACCINATION: ICD-10-CM

## 2023-09-20 PROCEDURE — 3075F SYST BP GE 130 - 139MM HG: CPT | Performed by: FAMILY MEDICINE

## 2023-09-20 PROCEDURE — 3008F BODY MASS INDEX DOCD: CPT | Performed by: FAMILY MEDICINE

## 2023-09-20 PROCEDURE — 90471 IMMUNIZATION ADMIN: CPT | Performed by: FAMILY MEDICINE

## 2023-09-20 PROCEDURE — 3078F DIAST BP <80 MM HG: CPT | Performed by: FAMILY MEDICINE

## 2023-09-20 PROCEDURE — 90686 IIV4 VACC NO PRSV 0.5 ML IM: CPT | Performed by: FAMILY MEDICINE

## 2023-09-20 PROCEDURE — 99214 OFFICE O/P EST MOD 30 MIN: CPT | Performed by: FAMILY MEDICINE

## 2023-09-20 RX ORDER — SEMAGLUTIDE 0.68 MG/ML
0.5 INJECTION, SOLUTION SUBCUTANEOUS WEEKLY
Qty: 1 EACH | Refills: 1 | Status: SHIPPED | OUTPATIENT
Start: 2023-09-20

## 2023-09-20 NOTE — TELEPHONE ENCOUNTER
PA needed for semaglutide (OZEMPIC, 0.25 OR 0.5 MG/DOSE,) 2 MG/3ML Subcutaneous Solution Pen-injector     KEY [id number]

## 2023-09-25 NOTE — TELEPHONE ENCOUNTER
Follow up PA and form recd for      semaglutide (OZEMPIC, 0.25 OR 0.5 MG/DOSE,) 2 MG/3ML Subcutaneous Solution Pen-injector      KEY [id number]

## 2023-10-26 ENCOUNTER — OFFICE VISIT (OUTPATIENT)
Dept: NEUROLOGY | Facility: CLINIC | Age: 63
End: 2023-10-26

## 2023-10-26 VITALS
WEIGHT: 223.81 LBS | DIASTOLIC BLOOD PRESSURE: 62 MMHG | HEART RATE: 72 BPM | BODY MASS INDEX: 38 KG/M2 | RESPIRATION RATE: 18 BRPM | SYSTOLIC BLOOD PRESSURE: 112 MMHG

## 2023-10-26 DIAGNOSIS — E11.42 DM TYPE 2 WITH DIABETIC PERIPHERAL NEUROPATHY (HCC): ICD-10-CM

## 2023-10-26 DIAGNOSIS — Z86.73 HISTORY OF STROKE: ICD-10-CM

## 2023-10-26 DIAGNOSIS — M51.36 LUMBAR DEGENERATIVE DISC DISEASE: ICD-10-CM

## 2023-10-26 DIAGNOSIS — G44.229 CHRONIC TENSION-TYPE HEADACHE, NOT INTRACTABLE: Primary | ICD-10-CM

## 2023-10-26 PROCEDURE — 3074F SYST BP LT 130 MM HG: CPT | Performed by: OTHER

## 2023-10-26 PROCEDURE — 99213 OFFICE O/P EST LOW 20 MIN: CPT | Performed by: OTHER

## 2023-10-26 PROCEDURE — 3078F DIAST BP <80 MM HG: CPT | Performed by: OTHER

## 2023-10-26 RX ORDER — GABAPENTIN 300 MG/1
300 CAPSULE ORAL 2 TIMES DAILY
Qty: 180 CAPSULE | Refills: 3 | Status: SHIPPED | OUTPATIENT
Start: 2023-10-26

## 2023-10-26 RX ORDER — BUTALBITAL AND ACETAMINOPHEN 50; 325 MG/1; MG/1
1 TABLET ORAL EVERY 4 HOURS PRN
COMMUNITY

## 2023-10-26 RX ORDER — SEMAGLUTIDE 0.68 MG/ML
0.5 INJECTION, SOLUTION SUBCUTANEOUS WEEKLY
Qty: 1 EACH | Refills: 1 | Status: SHIPPED | OUTPATIENT
Start: 2023-10-26

## 2023-10-26 NOTE — TELEPHONE ENCOUNTER
There is some issue with the ozempic script at the [facility] - pt told ins not covering there. Pt asking to [facility] and he will be put on a waiting list.  Pls call with questions.

## 2023-10-26 NOTE — PROGRESS NOTES
HPI:    Patient ID: Chastity Mcdaniel is a 60year old male. PCP: Dr Reynaldo Verduzco    Headache   Associated symptoms include back pain and weakness. Neurologic Problem  The patient's primary symptoms include weakness. Associated symptoms include back pain and headaches. Patient is a 60-year-old male who presented for follow-up for multiple neurological problem. States since office visit headaches have improved. He is on Gabapentin and working fine. Left sided weakness remains the same, does exercises at home. No new complaints       He has a history of old stroke hemorrhagic with residual left-sided hemiparesis in 2008, diabetes, hypertension, gait and balance problem and chronic headaches. He also has a history of chronic back pain for past couple weeks it has gotten worse to the point that he could not stand. He also complains of back spasms. At the moment there is no radicular leg pain but does get some intermittent sciatica. He is currently working with physical therapy. MRI of the lumbar spine showed multilevel mild degenerative disease more pronounced at L4-L5 levels  He further reports that still gets headache in the back of the head going down to the neck associated with some mild neck stiffness. He was on Topamax for few years but it was not effective. He is no longer taking Topamax. States he gets headaches about 1 to 2/weeks and manages with pain medication. Has gait problem since the stroke, stays balance is getting poor, sways and unsteady gait. He uses a cane for support.     HISTORY:  Past Medical History:   Diagnosis Date    Acute, but ill-defined, cerebrovascular disease March 2010    Arthropathy     Thoracic    Atypical mole January 2007    B12 deficiency     Back pain June 2005    Backache, unspecified     Background diabetic retinopathy(362.01)     Black stools March 2017    Bleeding nose January 2012    Capsulitis     Cataract     Cerebral embolism with cerebral infarction ([facility] [address] Corns and callosities     CVA (cerebral vascular accident) ([address]     Depression     Depressive disorder, not elsewhere classified     Diabetes ([address]     Diabetes mellitus ([address]     Diplopia     Essential hypertension     Fatigue March 2010    Feeling lonely March 2010    Flatulence/gas pain/belching Since procedure, has lessened slightly.     Frequent urination March 2010    Headache March 2010    Headache(784.0)     Hyperlipidemia     Hypertension     Leg swelling March 2010    Loss of appetite March 2010    Muscle weakness (generalized)     Myofacial muscle pain     Night sweats March 2010    Obesity, unspecified     Pain in joint, shoulder region     Pain in joints March 2010    Proliferative diabetic retinopathy(362.02)     Radiculitis, cervical     Sexual dysfunction     Shortness of breath March 2010    Sleep disturbance March 2010    Stool incontinence March 2017    Stroke 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, not stated as uncontrolled(250.50)     Uncomfortable fullness after meals March 2010    Unspecified cerebral artery occlusion with cerebral infarction     Vitreous hemorrhage ([address]     Wears glasses January 1998    Weight loss March 2010      Past Surgical History:   Procedure Laterality Date    COLONOSCOPY  April 2018    LASER SURGERY OF EYE  9/2010    OTHER SURGICAL HISTORY      URETHRAL DILATION    SHOULDER SURG PROC UNLISTED  4/1/2010    Left subdeltoid steroid injection, Left shoulder bursitis      Family History   Problem Relation Age of Onset    Other (MI [Other]) Father     Heart Disease Father     Prostate Cancer Father     Other (Liver ca [Other]) Mother     Other (Ovarian ca [Other]) Mother     Uterine Cancer Mother     Other (Colon ca [Other]) Other         fam hx    Arthritis Other         fam hx    Stroke Maternal Grandfather     Heart Disease Maternal Grandfather       Social History     Socioeconomic History    Marital status:    Tobacco Use    Smoking status: Never    Smokeless tobacco: Never   Vaping Use    Vaping Use: Never used   Substance and Sexual Activity    Alcohol use: Yes     Alcohol/week: 0.0 standard drinks of alcohol     Comment: 1 or 2 glasses of beer or wine per month, social    Drug use: No   Other Topics Concern    Caffeine Concern Yes     Comment: 2-3 cups coffee/day, large    Exercise Yes     Comment: walking        Review of Systems   Constitutional: Negative. HENT: Negative. Eyes: Negative. Respiratory: Negative. Cardiovascular: Negative. Gastrointestinal: Negative. Endocrine: Negative. Genitourinary: Negative. Musculoskeletal:  Positive for back pain and gait problem. Skin: Negative. Allergic/Immunologic: Negative. Neurological:  Positive for weakness and headaches. Hematological: Negative. Psychiatric/Behavioral: Negative. All other systems reviewed and are negative. Current Outpatient Medications   Medication Sig Dispense Refill    semaglutide (OZEMPIC, 0.25 OR 0.5 MG/DOSE,) 2 MG/3ML Subcutaneous Solution Pen-injector Inject 0.5 mg into the skin once a week. 1 each 1    metFORMIN 500 MG Oral Tab TAKE 1 TABLET BY MOUTH  TWICE DAILY WITH MEALS 180 tablet 3    LISINOPRIL 40 MG Oral Tab TAKE 1 TABLET BY MOUTH ONCE DAILY 90 tablet 3    AMLODIPINE 10 MG Oral Tab TAKE 1 TABLET BY MOUTH ONCE DAILY 90 tablet 3    SIMVASTATIN 40 MG Oral Tab TAKE 1 TABLET BY MOUTH  NIGHTLY AT BEDTIME 90 tablet 3    CONTOUR NEXT TEST In Vitro Strip USE WITH METER TO CHECK  BLOOD SUGAR TWICE DAILY 200 strip 3    Cholecalciferol (VITAMIN D) 50 MCG (2000 UT) Oral Cap Take by mouth.      cyanocobalamin 1000 MCG Oral Tab Take 1 tablet (1,000 mcg total) by mouth daily. HYDROcodone-acetaminophen 5-300 MG Oral Tab daily as needed.       ibuprofen 800 MG Oral Tab Take 1 tablet (800 mg total) by mouth 3 (three) times daily as needed. Sildenafil Citrate 100 MG Oral Tab Take 1 tablet (100 mg total) by mouth daily as needed for Erectile Dysfunction. 16 tablet 1    PREVIDENT 5000 DRY MOUTH 1.1 % Dental Gel USE IT AS A REGULAR TOOTHPASTE TID FOR 2 MINUTES.  3    Acetaminophen-Codeine #3 300-30 MG Oral Tab Take 1-2 tablets by mouth every 6 (six) hours as needed for Pain. 30 tablet 0    Chlorhexidine Gluconate 0.12 % Mouth/Throat Solution as needed. Aspirin 325 MG Oral Tab Take 1 tablet (325 mg total) by mouth daily. Butalbital-Acetaminophen (BUTALBITAL-APAP)  MG Oral Tab Take 1 tablet by mouth every 4 (four) hours as needed (as needed). Allergies:  Pcn [Penicillins]       UNKNOWN  Sulfa Antibiotics       UNKNOWN  Sulfa Drugs Cross R*    UNKNOWN  Sulfites                UNKNOWN  PHYSICAL EXAM:   Physical Exam  Blood pressure 112/62, pulse 72, resp. rate 18, weight 223 lb 12.8 oz (101.5 kg). General Appearance: Well nourished, well developed, no apparent distress. HEENT: Normocephalic and atraumatic. Cardiovascular: Normal rate, regular rhythm and normal heart sounds. Pulmonary/Chest: Effort normal and breath sounds normal.   Abdominal: Soft. Bowel sounds are normal.   Musculoskeletal: + low lumbar paraspinal tenderness  Ext: peripheral  pulses present  Psych: normal mood and affect    Neurological: Patient is awake, alert and oriented to person, place and time   Normal memory, attention/concentration, speech and language. Cranial Nerves:   II: Visual acuity: normal  III: Pupils: equal, round, reactive to light  III,IV,VI: Extra Ocular Movements: intact  V: Facial sensation: intact  VII: Facial strength: intact  VIII: Hearing: intact  IX: Palate: intact  XI: Shoulder shrug: intact  XII: Tongue movement: normal    Motor : Normal tone.  Strength is  5 out of 5 except LLE 4/5 proximally and distally 3/5 left foot weakness  Sensory: Sensory examination is normal to light touch and pinprick   Coordination: Finger-to-nose test normal bilaterally without evidence of dysmetria. Gait: unsteady, hemiparetic gait. TESTS/IMAGING:     MRI lumbar spine-9/14/2022  1. No acute process. 2. Multilevel disc disease and facet arthropathy which is relatively mild in degree. Disease is most pronounced at L4-5 where there is secondary mild central canal stenosis , mild bilateral subarticular and neural foraminal stenosis. No foci of   high-grade stenosis noted. 3. Small cyst along the inferior pole the left kidney, incompletely evaluated on this exam measuring 1.4 cm. ASSESSMENT/PLAN:     (F39.306) Chronic tension-type headache, not intractable  (primary encounter diagnosis)      (M51.36) Lumbar degenerative disc disease      (Z86.73) History of stroke      1. . Chronic tension type headaches  Stable, continue Gabapentin 300 mg BID    2. Mild multilevel lumbar DJD  Continue conservative management. Physical therapy completed    3. Old right hemispheric stroke with residual left leg weakness and gait abnormality  Continue Asa 81 mg daily   Continue Lipitor 40 mg daily  Optimal risk factor control      Follow up in about 12 months  See orders and medications filed with this encounter. The patient indicates understanding of these issues and agrees with the plan.         America Carreon MD  [facility] This Visit:  Requested Prescriptions      No prescriptions requested or ordered in this encounter       Imaging & Referrals:  None     II#[id number]

## 2023-11-08 ENCOUNTER — TELEPHONE (OUTPATIENT)
Dept: FAMILY MEDICINE CLINIC | Facility: CLINIC | Age: 63
End: 2023-11-08

## 2023-11-08 DIAGNOSIS — E11.42 DM TYPE 2 WITH DIABETIC PERIPHERAL NEUROPATHY (HCC): ICD-10-CM

## 2023-11-08 NOTE — TELEPHONE ENCOUNTER
Pharmacy asking for short supply of metFORMIN 500 MG to be filled at local pharmacy, [facility]. The mailorder is in route but will not reach pt for a few more days.   Pt is out of medication

## 2023-11-12 DIAGNOSIS — E11.42 DM TYPE 2 WITH DIABETIC PERIPHERAL NEUROPATHY (HCC): ICD-10-CM

## 2023-11-13 RX ORDER — SEMAGLUTIDE 0.68 MG/ML
0.5 INJECTION, SOLUTION SUBCUTANEOUS WEEKLY
Qty: 6 ML | Refills: 5 | Status: SHIPPED | OUTPATIENT
Start: 2023-11-13

## 2023-11-24 RX ORDER — PERPHENAZINE 16 MG/1
TABLET, FILM COATED ORAL
Qty: 200 STRIP | Refills: 3 | Status: SHIPPED | OUTPATIENT
Start: 2023-11-24

## 2023-12-18 ENCOUNTER — LAB ENCOUNTER (OUTPATIENT)
Dept: LAB | Age: 63
End: 2023-12-18
Attending: FAMILY MEDICINE
Payer: COMMERCIAL

## 2023-12-18 LAB
ALBUMIN SERPL-MCNC: 3.4 G/DL (ref 3.4–5)
ALBUMIN/GLOB SERPL: 0.9 {RATIO} (ref 1–2)
ALP LIVER SERPL-CCNC: 68 U/L
ALT SERPL-CCNC: 7 U/L
ANION GAP SERPL CALC-SCNC: 4 MMOL/L (ref 0–18)
AST SERPL-CCNC: 9 U/L (ref 15–37)
BILIRUB SERPL-MCNC: 0.5 MG/DL (ref 0.1–2)
BUN BLD-MCNC: 6 MG/DL (ref 9–23)
CALCIUM BLD-MCNC: 9.1 MG/DL (ref 8.5–10.1)
CHLORIDE SERPL-SCNC: 110 MMOL/L (ref 98–112)
CHOLEST SERPL-MCNC: 131 MG/DL (ref ?–200)
CO2 SERPL-SCNC: 28 MMOL/L (ref 21–32)
CREAT BLD-MCNC: 0.88 MG/DL
EGFRCR SERPLBLD CKD-EPI 2021: 96 ML/MIN/1.73M2 (ref 60–?)
EST. AVERAGE GLUCOSE BLD GHB EST-MCNC: 146 MG/DL (ref 68–126)
FASTING PATIENT LIPID ANSWER: YES
FASTING STATUS PATIENT QL REPORTED: YES
GLOBULIN PLAS-MCNC: 4 G/DL (ref 2.8–4.4)
GLUCOSE BLD-MCNC: 145 MG/DL (ref 70–99)
HBA1C MFR BLD: 6.7 % (ref ?–5.7)
HDLC SERPL-MCNC: 54 MG/DL (ref 40–59)
LDLC SERPL CALC-MCNC: 57 MG/DL (ref ?–100)
NONHDLC SERPL-MCNC: 77 MG/DL (ref ?–130)
OSMOLALITY SERPL CALC.SUM OF ELEC: 294 MOSM/KG (ref 275–295)
POTASSIUM SERPL-SCNC: 4.5 MMOL/L (ref 3.5–5.1)
PROT SERPL-MCNC: 7.4 G/DL (ref 6.4–8.2)
SODIUM SERPL-SCNC: 142 MMOL/L (ref 136–145)
TRIGL SERPL-MCNC: 112 MG/DL (ref 30–149)
VLDLC SERPL CALC-MCNC: 16 MG/DL (ref 0–30)

## 2023-12-18 PROCEDURE — 80061 LIPID PANEL: CPT | Performed by: FAMILY MEDICINE

## 2023-12-18 PROCEDURE — 3044F HG A1C LEVEL LT 7.0%: CPT | Performed by: FAMILY MEDICINE

## 2023-12-18 PROCEDURE — 80053 COMPREHEN METABOLIC PANEL: CPT | Performed by: FAMILY MEDICINE

## 2023-12-18 PROCEDURE — 83036 HEMOGLOBIN GLYCOSYLATED A1C: CPT | Performed by: FAMILY MEDICINE

## 2023-12-20 ENCOUNTER — OFFICE VISIT (OUTPATIENT)
Dept: FAMILY MEDICINE CLINIC | Facility: CLINIC | Age: 63
End: 2023-12-20
Payer: COMMERCIAL

## 2023-12-20 VITALS
DIASTOLIC BLOOD PRESSURE: 68 MMHG | BODY MASS INDEX: 36.19 KG/M2 | OXYGEN SATURATION: 95 % | WEIGHT: 212 LBS | HEIGHT: 64 IN | HEART RATE: 70 BPM | RESPIRATION RATE: 16 BRPM | SYSTOLIC BLOOD PRESSURE: 126 MMHG

## 2023-12-20 DIAGNOSIS — R06.83 SNORING: ICD-10-CM

## 2023-12-20 DIAGNOSIS — I10 ESSENTIAL HYPERTENSION: ICD-10-CM

## 2023-12-20 DIAGNOSIS — Z86.73 HISTORY OF CVA (CEREBROVASCULAR ACCIDENT): ICD-10-CM

## 2023-12-20 DIAGNOSIS — E11.42 DM TYPE 2 WITH DIABETIC PERIPHERAL NEUROPATHY (HCC): Primary | ICD-10-CM

## 2023-12-20 PROCEDURE — 3008F BODY MASS INDEX DOCD: CPT | Performed by: FAMILY MEDICINE

## 2023-12-20 PROCEDURE — 90679 RSV VACC PREF RECOMB ADJT IM: CPT | Performed by: FAMILY MEDICINE

## 2023-12-20 PROCEDURE — 99214 OFFICE O/P EST MOD 30 MIN: CPT | Performed by: FAMILY MEDICINE

## 2023-12-20 PROCEDURE — 3074F SYST BP LT 130 MM HG: CPT | Performed by: FAMILY MEDICINE

## 2023-12-20 PROCEDURE — 90471 IMMUNIZATION ADMIN: CPT | Performed by: FAMILY MEDICINE

## 2023-12-20 PROCEDURE — 3078F DIAST BP <80 MM HG: CPT | Performed by: FAMILY MEDICINE

## 2024-02-07 DIAGNOSIS — E11.42 DM TYPE 2 WITH DIABETIC PERIPHERAL NEUROPATHY (HCC): ICD-10-CM

## 2024-02-08 RX ORDER — SEMAGLUTIDE 1.34 MG/ML
1 INJECTION, SOLUTION SUBCUTANEOUS WEEKLY
Qty: 9 ML | Refills: 1 | Status: SHIPPED | OUTPATIENT
Start: 2024-02-08

## 2024-02-08 NOTE — TELEPHONE ENCOUNTER
A refill request was received for:  Requested Prescriptions     Pending Prescriptions Disp Refills    OZEMPIC, 1 MG/DOSE, 4 MG/3ML Subcutaneous Solution Pen-injector [Pharmacy Med Name: Ozempic (1 MG/DOSE) 4 MG/3ML Subcutaneous Solution Pen-injector] 9 mL 3     Sig: INJECT SUBCUTANEOUSLY 1 MG EVERY WEEK       Last refill date:   12/20/2023    Last office visit: 12/20/2023    Follow up due:  Future Appointments   Date Time Provider Department Center   3/20/2024  9:30 AM Ale Daily DO [facility] [address]

## 2024-03-19 ENCOUNTER — LAB ENCOUNTER (OUTPATIENT)
Dept: LAB | Age: 64
End: 2024-03-19
Attending: FAMILY MEDICINE
Payer: COMMERCIAL

## 2024-03-19 LAB
ALBUMIN SERPL-MCNC: 3.5 G/DL (ref 3.4–5)
ALBUMIN/GLOB SERPL: 0.9 {RATIO} (ref 1–2)
ALP LIVER SERPL-CCNC: 70 U/L
ALT SERPL-CCNC: 7 U/L
ANION GAP SERPL CALC-SCNC: 5 MMOL/L (ref 0–18)
AST SERPL-CCNC: 7 U/L (ref 15–37)
BILIRUB SERPL-MCNC: 0.4 MG/DL (ref 0.1–2)
BUN BLD-MCNC: 7 MG/DL (ref 9–23)
CALCIUM BLD-MCNC: 8.9 MG/DL (ref 8.5–10.1)
CHLORIDE SERPL-SCNC: 109 MMOL/L (ref 98–112)
CHOLEST SERPL-MCNC: 135 MG/DL (ref ?–200)
CO2 SERPL-SCNC: 26 MMOL/L (ref 21–32)
CREAT BLD-MCNC: 0.72 MG/DL
CREAT UR-SCNC: 116 MG/DL
EGFRCR SERPLBLD CKD-EPI 2021: 102 ML/MIN/1.73M2 (ref 60–?)
EST. AVERAGE GLUCOSE BLD GHB EST-MCNC: 137 MG/DL (ref 68–126)
FASTING PATIENT LIPID ANSWER: YES
FASTING STATUS PATIENT QL REPORTED: YES
GLOBULIN PLAS-MCNC: 3.7 G/DL (ref 2.8–4.4)
GLUCOSE BLD-MCNC: 153 MG/DL (ref 70–99)
HBA1C MFR BLD: 6.4 % (ref ?–5.7)
HDLC SERPL-MCNC: 60 MG/DL (ref 40–59)
LDLC SERPL CALC-MCNC: 62 MG/DL (ref ?–100)
MICROALBUMIN UR-MCNC: 2.1 MG/DL
MICROALBUMIN/CREAT 24H UR-RTO: 18.1 UG/MG (ref ?–30)
NONHDLC SERPL-MCNC: 75 MG/DL (ref ?–130)
OSMOLALITY SERPL CALC.SUM OF ELEC: 291 MOSM/KG (ref 275–295)
POTASSIUM SERPL-SCNC: 3.9 MMOL/L (ref 3.5–5.1)
PROT SERPL-MCNC: 7.2 G/DL (ref 6.4–8.2)
SODIUM SERPL-SCNC: 140 MMOL/L (ref 136–145)
TRIGL SERPL-MCNC: 59 MG/DL (ref 30–149)
VLDLC SERPL CALC-MCNC: 9 MG/DL (ref 0–30)

## 2024-03-19 PROCEDURE — 3061F NEG MICROALBUMINURIA REV: CPT | Performed by: FAMILY MEDICINE

## 2024-03-19 PROCEDURE — 82043 UR ALBUMIN QUANTITATIVE: CPT | Performed by: FAMILY MEDICINE

## 2024-03-19 PROCEDURE — 83036 HEMOGLOBIN GLYCOSYLATED A1C: CPT | Performed by: FAMILY MEDICINE

## 2024-03-19 PROCEDURE — 3044F HG A1C LEVEL LT 7.0%: CPT | Performed by: FAMILY MEDICINE

## 2024-03-19 PROCEDURE — 80061 LIPID PANEL: CPT | Performed by: FAMILY MEDICINE

## 2024-03-19 PROCEDURE — 80053 COMPREHEN METABOLIC PANEL: CPT | Performed by: FAMILY MEDICINE

## 2024-03-19 PROCEDURE — 82570 ASSAY OF URINE CREATININE: CPT | Performed by: FAMILY MEDICINE

## 2024-03-20 ENCOUNTER — OFFICE VISIT (OUTPATIENT)
Dept: FAMILY MEDICINE CLINIC | Facility: CLINIC | Age: 64
End: 2024-03-20
Payer: COMMERCIAL

## 2024-03-20 VITALS
DIASTOLIC BLOOD PRESSURE: 52 MMHG | WEIGHT: 212 LBS | HEIGHT: 64 IN | BODY MASS INDEX: 36.19 KG/M2 | SYSTOLIC BLOOD PRESSURE: 108 MMHG | RESPIRATION RATE: 16 BRPM | OXYGEN SATURATION: 99 % | HEART RATE: 75 BPM

## 2024-03-20 DIAGNOSIS — Z00.00 GENERAL MEDICAL EXAM: ICD-10-CM

## 2024-03-20 DIAGNOSIS — I10 ESSENTIAL HYPERTENSION: ICD-10-CM

## 2024-03-20 DIAGNOSIS — E11.42 DM TYPE 2 WITH DIABETIC PERIPHERAL NEUROPATHY (HCC): Primary | ICD-10-CM

## 2024-03-20 DIAGNOSIS — R93.1 ELEVATED CORONARY ARTERY CALCIUM SCORE: ICD-10-CM

## 2024-03-20 DIAGNOSIS — Z86.73 HISTORY OF CVA (CEREBROVASCULAR ACCIDENT): ICD-10-CM

## 2024-03-20 PROCEDURE — 3008F BODY MASS INDEX DOCD: CPT | Performed by: FAMILY MEDICINE

## 2024-03-20 PROCEDURE — 99214 OFFICE O/P EST MOD 30 MIN: CPT | Performed by: FAMILY MEDICINE

## 2024-03-20 PROCEDURE — 3074F SYST BP LT 130 MM HG: CPT | Performed by: FAMILY MEDICINE

## 2024-03-20 PROCEDURE — 3078F DIAST BP <80 MM HG: CPT | Performed by: FAMILY MEDICINE

## 2024-03-20 NOTE — PROGRESS NOTES
HPI:   Malachi Gonzalez is a 63 year old male who presents for recheck of his diabetes       Low energy   No sleep study thus far      Patient’s -150's   2 hr PP - 180's   Eating later in the evening   On ozempic 0.5   Good weight loss   No SE   Not working out much       Malachi has been checking his feet on a regular basis.   Malachi denies any tingling of the feet.   Pt complains of poor balance; pt uses a cane since his CVA.    Last eye exam:  Dr. Ritchie h/o retinopathy and Dr gomez     Seeing neuro 2x per year  Last saw cards 2 years ago     Patient denies chest pain, shortness of breath, dizziness, and lightheadedness. No exertional symptoms.  Component      Latest Ref Rng 6/9/2023 9/7/2023 12/18/2023 3/19/2024   Glucose      70 - 99 mg/dL 147 (H)  137 (H)  145 (H)  153 (H)    Sodium      136 - 145 mmol/L 138  141  142  140    Potassium      3.5 - 5.1 mmol/L 4.3  3.9  4.5  3.9    Chloride      98 - 112 mmol/L 110  110  110  109    Carbon Dioxide, Total      21.0 - 32.0 mmol/L 27.0  25.0  28.0  26.0    ANION GAP      0 - 18 mmol/L 1  6  4  5    BUN      9 - 23 mg/dL 11  12  6 (L)  7 (L)    CREATININE      0.70 - 1.30 mg/dL 0.75  0.85  0.88  0.72    CALCIUM      8.5 - 10.1 mg/dL 8.6  8.6  9.1  8.9    CALCULATED OSMOLALITY      275 - 295 mOsm/kg 288  294  294  291    EGFR      >=60 mL/min/1.73m2 101  98  96  102    AST (SGOT)      15 - 37 U/L 14 (L)  9 (L)  9 (L)  7 (L)    ALT (SGPT)      16 - 61 U/L 9 (L)  10 (L)  7 (L)  7 (L)    ALKALINE PHOSPHATASE      45 - 117 U/L 64  59  68  70    Total Bilirubin      0.1 - 2.0 mg/dL 0.6  0.8  0.5  0.4    PROTEIN, TOTAL      6.4 - 8.2 g/dL 7.4  7.0  7.4  7.2    Albumin      3.4 - 5.0 g/dL 3.7  3.6  3.4  3.5    Globulin      2.8 - 4.4 g/dL 3.7  3.4  4.0  3.7    A/G Ratio      1.0 - 2.0  1.0  1.1  0.9 (L)  0.9 (L)    Patient Fasting for CMP? Yes  Yes  Yes  Yes    Cholesterol, Total      <200 mg/dL 135  130  131  135    HDL Cholesterol      40 - 59 mg/dL 65 (H)   50  54  60 (H)    Triglycerides      30 - 149 mg/dL 59  87  112  59    LDL Cholesterol Calc      <100 mg/dL 57  63  57  62    VLDL      0 - 30 mg/dL 9  13  16  9    NON-HDL CHOLESTEROL      <130 mg/dL 70  80  77  75    Patient Fasting for Lipid? Yes  Yes  Yes  Yes    MALB URINE      mg/dL    2.10    CREATININE UR RANDOM      mg/dL    116.00    MALB/CRE CALC      <=30.0 ug/mg    18.1    HEMOGLOBIN A1c      <5.7 % 6.7 (H)  6.4 (H)  6.7 (H)  6.4 (H)    ESTIMATED AVERAGE GLUCOSE      68 - 126 mg/dL 146 (H)  137 (H)  146 (H)  137 (H)       Legend:  (H) High  (L) Low    Current Outpatient Medications   Medication Sig Dispense Refill    semaglutide (OZEMPIC, 1 MG/DOSE,) 4 MG/3ML Subcutaneous Solution Pen-injector Inject 1 mg into the skin once a week. 9 mL 1    Glucose Blood (CONTOUR NEXT TEST) In Vitro Strip USE WITH METER TO CHECK  BLOOD SUGAR TWICE DAILY. 200 strip 3    metFORMIN 500 MG Oral Tab TAKE 1 TABLET BY MOUTH  TWICE DAILY WITH MEALS 60 tablet 0    Butalbital-Acetaminophen (BUTALBITAL-APAP)  MG Oral Tab Take 1 tablet by mouth every 4 (four) hours as needed (as needed).      gabapentin 300 MG Oral Cap Take 1 capsule (300 mg total) by mouth in the morning and 1 capsule (300 mg total) before bedtime. 180 capsule 3    LISINOPRIL 40 MG Oral Tab TAKE 1 TABLET BY MOUTH ONCE DAILY 90 tablet 3    AMLODIPINE 10 MG Oral Tab TAKE 1 TABLET BY MOUTH ONCE DAILY 90 tablet 3    SIMVASTATIN 40 MG Oral Tab TAKE 1 TABLET BY MOUTH  NIGHTLY AT BEDTIME 90 tablet 3    Cholecalciferol (VITAMIN D) 50 MCG (2000 UT) Oral Cap Take by mouth.      cyanocobalamin 1000 MCG Oral Tab Take 1 tablet (1,000 mcg total) by mouth daily.      HYDROcodone-acetaminophen 5-300 MG Oral Tab daily as needed.      ibuprofen 800 MG Oral Tab Take 1 tablet (800 mg total) by mouth 3 (three) times daily as needed.      Sildenafil Citrate 100 MG Oral Tab Take 1 tablet (100 mg total) by mouth daily as needed for Erectile Dysfunction. 16 tablet 1    PREVIDENT  5000 DRY MOUTH 1.1 % Dental Gel USE IT AS A REGULAR TOOTHPASTE TID FOR 2 MINUTES.  3    Acetaminophen-Codeine #3 300-30 MG Oral Tab Take 1-2 tablets by mouth every 6 (six) hours as needed for Pain. 30 tablet 0    Chlorhexidine Gluconate 0.12 % Mouth/Throat Solution as needed.        Aspirin 325 MG Oral Tab Take 1 tablet (325 mg total) by mouth daily.        Past Medical History:   Diagnosis Date    Acute, but ill-defined, cerebrovascular disease March 2010    Arthropathy     Thoracic    Atypical mole January 2007    B12 deficiency     Back pain June 2005    Backache, unspecified     Background diabetic retinopathy(362.01)     Black stools March 2017    Bleeding nose January 2012    Capsulitis     Cataract     Cerebral embolism with cerebral infarction ([facility])     Corns and callosities     CVA (cerebral vascular accident) ([facility])     Depression     Depressive disorder, not elsewhere classified     Diabetes ([facility])     Diabetes mellitus ([facility])     Diplopia     Essential hypertension     Fatigue March 2010    Feeling lonely March 2010    Flatulence/gas pain/belching Since procedure, has lessened slightly.    Frequent urination March 2010    Headache March 2010    Headache(784.0)     Hyperlipidemia     Hypertension     Leg swelling March 2010    Loss of appetite March 2010    Muscle weakness (generalized)     Myofacial muscle pain     Night sweats March 2010    Obesity, unspecified     Pain in joint, shoulder region     Pain in joints March 2010    Proliferative diabetic retinopathy(362.02)     Radiculitis, cervical     Sexual dysfunction     Shortness of breath March 2010    Sleep disturbance March 2010    Stool incontinence March 2017    Stroke ([facility])     Syncope and collapse     Tendonitis     Tension headache     Thalamic infarction ([facility])     bilateral thalamic infarction    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, not stated as uncontrolled(250.50)     Uncomfortable fullness after meals  March 2010    Unspecified cerebral artery occlusion with cerebral infarction     Vitreous hemorrhage (HCC)     Wears glasses January 1998    Weight loss March 2010      Past Surgical History:   Procedure Laterality Date    COLONOSCOPY  April 2018    LASER SURGERY OF EYE  9/2010    OTHER SURGICAL HISTORY      URETHRAL DILATION    SHOULDER SURG PROC UNLISTED  4/1/2010    Left subdeltoid steroid injection, Left shoulder bursitis      Social History:   Social History     Socioeconomic History    Marital status:    Tobacco Use    Smoking status: Never    Smokeless tobacco: Never   Vaping Use    Vaping Use: Never used   Substance and Sexual Activity    Alcohol use: Yes     Alcohol/week: 0.0 standard drinks of alcohol     Comment: 1 or 2 glasses of beer or wine per month, social    Drug use: No   Other Topics Concern    Caffeine Concern Yes     Comment: 2-3 cups coffee/day, large    Exercise Yes     Comment: walking   pt reports he lives with his wife  On disability after CVA  2 children   Exercise: three times per week.  Diet: watches sugar closely     REVIEW OF SYSTEMS:   GENERAL: feels well otherwise  SKIN: denies any unusual skin lesions or rashes  PULMONARY: denies cough or shortness of breath  CV: denies chest pain or palpitations  GI: denies abdominal pain, heartburn, chronic diarrhea or constipation  NEURO: denies headaches or dizziness  PSYCH: denies depression or anxiety  NUTRITION:follows diabetic diet    EXAM:   /52   Pulse 75   Resp 16   Ht 5' 4\" (1.626 m)   Wt 212 lb (96.2 kg)   SpO2 99%   BMI 36.39 kg/m²    Body mass index is 36.39 kg/m².  GENERAL: well developed, well nourished, in no apparent distress  SKIN: no rashes,no suspicious lesions  NECK: supple,no adenopathy, no thyromegaly  LUNGS: clear to auscultation, easy breathing, no cough  CV: normal S1 S2, RRR without murmur  GI: good BS's, no masses, no HSM or tenderness, no CVA tenderness   EXT: no cyanosis, clubbing or edema,  bilateral foot exam unremarkable for open skin or lesions, bilateral 2+ DP, normal visual inspection bilaterally  NEURO: sensation is intact to monofilament bilaterally   Left sided weakness   PSYCH: judgement and insight are appropriate & intact      ASSESSMENT AND PLAN:   Malachi Gonzalez is a 63 year old male who presents for a recheck of his diabetes.   Discussed importance of medication, diet adherence, skin care and routine exercise.   Reviewed good diabetic foot care.   Annual dilated eye exams reinforced. Routine accuchecks and diabetic labwork as discussed.    1. DM type 2 with diabetic peripheral neuropathy (HCC)  Cpm     2. Essential hypertension  Cpm     3. History of CVA (cerebrovascular accident)  Cpm     4. Elevated coronary artery calcium score  Cpm     5. General medical exam    - Free T4, (Free Thyroxine)  - Assay, Thyroid Stim Hormone  - Lipid Panel  - CBC With Differential With Platelet  - Vitamin B12  - Comp Metabolic Panel (14)  - Hemoglobin A1C  - VITAMIN D, 25-HYDROXY [04192][Q]  - PSA, TOTAL W REFLEX TO PSA, FREE [[id number]][Q]    DISCUSSED SLEEP STUDY     The patient indicates understanding of these issues and agrees to the plan.  Follow up 3 month or sooner if needed

## 2024-04-04 DIAGNOSIS — E11.42 DM TYPE 2 WITH DIABETIC PERIPHERAL NEUROPATHY (HCC): ICD-10-CM

## 2024-04-04 DIAGNOSIS — I69.354 HEMIPARESIS AFFECTING LEFT SIDE AS LATE EFFECT OF STROKE (HCC): ICD-10-CM

## 2024-04-04 RX ORDER — SIMVASTATIN 40 MG
40 TABLET ORAL NIGHTLY
Qty: 90 TABLET | Refills: 3 | Status: SHIPPED | OUTPATIENT
Start: 2024-04-04

## 2024-04-20 DIAGNOSIS — I10 ESSENTIAL HYPERTENSION: ICD-10-CM

## 2024-04-22 RX ORDER — AMLODIPINE BESYLATE 10 MG/1
10 TABLET ORAL DAILY
Qty: 90 TABLET | Refills: 3 | Status: SHIPPED | OUTPATIENT
Start: 2024-04-22

## 2024-04-22 RX ORDER — LISINOPRIL 40 MG/1
40 TABLET ORAL DAILY
Qty: 90 TABLET | Refills: 3 | Status: SHIPPED | OUTPATIENT
Start: 2024-04-22

## 2024-06-11 DIAGNOSIS — E11.42 DM TYPE 2 WITH DIABETIC PERIPHERAL NEUROPATHY (HCC): ICD-10-CM

## 2024-06-12 RX ORDER — SEMAGLUTIDE 1.34 MG/ML
1 INJECTION, SOLUTION SUBCUTANEOUS WEEKLY
Qty: 9 ML | Refills: 0 | Status: SHIPPED | OUTPATIENT
Start: 2024-06-12

## 2024-06-12 NOTE — TELEPHONE ENCOUNTER
A refill request was received for:  Requested Prescriptions     Pending Prescriptions Disp Refills    OZEMPIC, 1 MG/DOSE, 4 MG/3ML Subcutaneous Solution Pen-injector [Pharmacy Med Name: Ozempic (1 MG/DOSE) 4 MG/3ML Subcutaneous Solution Pen-injector] 9 mL 3     Sig: INJECT SUBCUTANEOUSLY 1 MG EVERY WEEK       Last refill date:   2/8/2024    Last office visit: 3/20/2024    Follow up due:  No future appointments.

## 2024-06-17 DIAGNOSIS — E11.42 DM TYPE 2 WITH DIABETIC PERIPHERAL NEUROPATHY (HCC): ICD-10-CM

## 2024-07-12 ENCOUNTER — LAB ENCOUNTER (OUTPATIENT)
Dept: LAB | Age: 64
End: 2024-07-12
Attending: FAMILY MEDICINE
Payer: COMMERCIAL

## 2024-07-12 DIAGNOSIS — Z00.00 GENERAL MEDICAL EXAM: Primary | ICD-10-CM

## 2024-07-12 LAB
ALBUMIN SERPL-MCNC: 3.6 G/DL (ref 3.4–5)
ALBUMIN/GLOB SERPL: 1.1 {RATIO} (ref 1–2)
ALP LIVER SERPL-CCNC: 58 U/L
ALT SERPL-CCNC: 11 U/L
ANION GAP SERPL CALC-SCNC: 6 MMOL/L (ref 0–18)
AST SERPL-CCNC: 8 U/L (ref 15–37)
BASOPHILS # BLD AUTO: 0.04 X10(3) UL (ref 0–0.2)
BASOPHILS NFR BLD AUTO: 0.6 %
BILIRUB SERPL-MCNC: 0.5 MG/DL (ref 0.1–2)
BUN BLD-MCNC: 9 MG/DL (ref 9–23)
CALCIUM BLD-MCNC: 8.5 MG/DL (ref 8.5–10.1)
CHLORIDE SERPL-SCNC: 110 MMOL/L (ref 98–112)
CHOLEST SERPL-MCNC: 131 MG/DL (ref ?–200)
CO2 SERPL-SCNC: 27 MMOL/L (ref 21–32)
CREAT BLD-MCNC: 0.67 MG/DL
EGFRCR SERPLBLD CKD-EPI 2021: 104 ML/MIN/1.73M2 (ref 60–?)
EOSINOPHIL # BLD AUTO: 0.28 X10(3) UL (ref 0–0.7)
EOSINOPHIL NFR BLD AUTO: 3.9 %
ERYTHROCYTE [DISTWIDTH] IN BLOOD BY AUTOMATED COUNT: 14 %
EST. AVERAGE GLUCOSE BLD GHB EST-MCNC: 123 MG/DL (ref 68–126)
FASTING PATIENT LIPID ANSWER: YES
FASTING STATUS PATIENT QL REPORTED: YES
GLOBULIN PLAS-MCNC: 3.2 G/DL (ref 2.8–4.4)
GLUCOSE BLD-MCNC: 114 MG/DL (ref 70–99)
HBA1C MFR BLD: 5.9 % (ref ?–5.7)
HCT VFR BLD AUTO: 42.5 %
HDLC SERPL-MCNC: 57 MG/DL (ref 40–59)
HGB BLD-MCNC: 13.5 G/DL
IMM GRANULOCYTES # BLD AUTO: 0.03 X10(3) UL (ref 0–1)
IMM GRANULOCYTES NFR BLD: 0.4 %
LDLC SERPL CALC-MCNC: 62 MG/DL (ref ?–100)
LYMPHOCYTES # BLD AUTO: 1.77 X10(3) UL (ref 1–4)
LYMPHOCYTES NFR BLD AUTO: 24.4 %
MCH RBC QN AUTO: 28.6 PG (ref 26–34)
MCHC RBC AUTO-ENTMCNC: 31.8 G/DL (ref 31–37)
MCV RBC AUTO: 90 FL
MONOCYTES # BLD AUTO: 0.6 X10(3) UL (ref 0.1–1)
MONOCYTES NFR BLD AUTO: 8.3 %
NEUTROPHILS # BLD AUTO: 4.54 X10 (3) UL (ref 1.5–7.7)
NEUTROPHILS # BLD AUTO: 4.54 X10(3) UL (ref 1.5–7.7)
NEUTROPHILS NFR BLD AUTO: 62.4 %
NONHDLC SERPL-MCNC: 74 MG/DL (ref ?–130)
OSMOLALITY SERPL CALC.SUM OF ELEC: 296 MOSM/KG (ref 275–295)
PLATELET # BLD AUTO: 228 10(3)UL (ref 150–450)
POTASSIUM SERPL-SCNC: 4.2 MMOL/L (ref 3.5–5.1)
PROT SERPL-MCNC: 6.8 G/DL (ref 6.4–8.2)
PSA SERPL-MCNC: 0.93 NG/ML (ref ?–4)
RBC # BLD AUTO: 4.72 X10(6)UL
SODIUM SERPL-SCNC: 143 MMOL/L (ref 136–145)
T4 FREE SERPL-MCNC: 1 NG/DL (ref 0.8–1.7)
TRIGL SERPL-MCNC: 54 MG/DL (ref 30–149)
TSI SER-ACNC: 1.55 MIU/ML (ref 0.36–3.74)
VIT B12 SERPL-MCNC: 1599 PG/ML (ref 193–986)
VIT D+METAB SERPL-MCNC: 68 NG/ML (ref 30–100)
VLDLC SERPL CALC-MCNC: 8 MG/DL (ref 0–30)
WBC # BLD AUTO: 7.3 X10(3) UL (ref 4–11)

## 2024-07-12 PROCEDURE — 84153 ASSAY OF PSA TOTAL: CPT

## 2024-07-12 PROCEDURE — 80053 COMPREHEN METABOLIC PANEL: CPT | Performed by: FAMILY MEDICINE

## 2024-07-12 PROCEDURE — 85025 COMPLETE CBC W/AUTO DIFF WBC: CPT | Performed by: FAMILY MEDICINE

## 2024-07-12 PROCEDURE — 82607 VITAMIN B-12: CPT | Performed by: FAMILY MEDICINE

## 2024-07-12 PROCEDURE — 82306 VITAMIN D 25 HYDROXY: CPT | Performed by: FAMILY MEDICINE

## 2024-07-12 PROCEDURE — 84439 ASSAY OF FREE THYROXINE: CPT | Performed by: FAMILY MEDICINE

## 2024-07-12 PROCEDURE — 80061 LIPID PANEL: CPT | Performed by: FAMILY MEDICINE

## 2024-07-12 PROCEDURE — 84443 ASSAY THYROID STIM HORMONE: CPT | Performed by: FAMILY MEDICINE

## 2024-07-12 PROCEDURE — 83036 HEMOGLOBIN GLYCOSYLATED A1C: CPT | Performed by: FAMILY MEDICINE

## 2024-07-15 ENCOUNTER — OFFICE VISIT (OUTPATIENT)
Dept: FAMILY MEDICINE CLINIC | Facility: CLINIC | Age: 64
End: 2024-07-15
Payer: COMMERCIAL

## 2024-07-15 VITALS
SYSTOLIC BLOOD PRESSURE: 118 MMHG | DIASTOLIC BLOOD PRESSURE: 60 MMHG | BODY MASS INDEX: 33.97 KG/M2 | RESPIRATION RATE: 16 BRPM | WEIGHT: 199 LBS | HEART RATE: 74 BPM | OXYGEN SATURATION: 98 % | HEIGHT: 64 IN

## 2024-07-15 DIAGNOSIS — E11.42 DM TYPE 2 WITH DIABETIC PERIPHERAL NEUROPATHY (HCC): Primary | ICD-10-CM

## 2024-07-15 DIAGNOSIS — I10 ESSENTIAL HYPERTENSION: ICD-10-CM

## 2024-07-15 DIAGNOSIS — R93.1 ELEVATED CORONARY ARTERY CALCIUM SCORE: ICD-10-CM

## 2024-07-15 DIAGNOSIS — Z86.73 HISTORY OF CVA (CEREBROVASCULAR ACCIDENT): ICD-10-CM

## 2024-07-15 PROCEDURE — 3008F BODY MASS INDEX DOCD: CPT | Performed by: FAMILY MEDICINE

## 2024-07-15 PROCEDURE — 3074F SYST BP LT 130 MM HG: CPT | Performed by: FAMILY MEDICINE

## 2024-07-15 PROCEDURE — 3044F HG A1C LEVEL LT 7.0%: CPT | Performed by: FAMILY MEDICINE

## 2024-07-15 PROCEDURE — 99214 OFFICE O/P EST MOD 30 MIN: CPT | Performed by: FAMILY MEDICINE

## 2024-07-15 PROCEDURE — 3078F DIAST BP <80 MM HG: CPT | Performed by: FAMILY MEDICINE

## 2024-07-15 NOTE — PROGRESS NOTES
HPI:   Malachi Gonzalez is a 63 year old male who presents for recheck of his diabetes       No sleep study thus far     Patient’s -150's  Eating later in the evening   On ozempic 1mg / declines increase   Good weight loss   No SE   Walking some     Malachi has been checking his feet on a regular basis.   Malachi denies any tingling of the feet.   Pt complains of poor balance; pt uses a cane since his CVA.    Last eye exam:  Dr. Ritchie h/o retinopathy and Dr gomez     Seeing neuro 2x per year  Last saw cards 2 years ago     Patient denies chest pain, shortness of breath, dizziness, and lightheadedness. No exertional symptoms.      Component      Latest Ref Rng 9/7/2023 12/18/2023 3/19/2024 7/12/2024   Glucose      70 - 99 mg/dL 137 (H)  145 (H)  153 (H)  114 (H)    Sodium      136 - 145 mmol/L 141  142  140  143    Potassium      3.5 - 5.1 mmol/L 3.9  4.5  3.9  4.2    Chloride      98 - 112 mmol/L 110  110  109  110    Carbon Dioxide, Total      21.0 - 32.0 mmol/L 25.0  28.0  26.0  27.0    ANION GAP      0 - 18 mmol/L 6  4  5  6    BUN      9 - 23 mg/dL 12  6 (L)  7 (L)  9    CREATININE      0.70 - 1.30 mg/dL 0.85  0.88  0.72  0.67 (L)    CALCIUM      8.5 - 10.1 mg/dL 8.6  9.1  8.9  8.5    CALCULATED OSMOLALITY      275 - 295 mOsm/kg 294  294  291  296 (H)    EGFR      >=60 mL/min/1.73m2 98  96  102  104    AST (SGOT)      15 - 37 U/L 9 (L)  9 (L)  7 (L)  8 (L)    ALT (SGPT)      16 - 61 U/L 10 (L)  7 (L)  7 (L)  11 (L)    ALKALINE PHOSPHATASE      45 - 117 U/L 59  68  70  58    Total Bilirubin      0.1 - 2.0 mg/dL 0.8  0.5  0.4  0.5    PROTEIN, TOTAL      6.4 - 8.2 g/dL 7.0  7.4  7.2  6.8    Albumin      3.4 - 5.0 g/dL 3.6  3.4  3.5  3.6    Globulin      2.8 - 4.4 g/dL 3.4  4.0  3.7  3.2    A/G Ratio      1.0 - 2.0  1.1  0.9 (L)  0.9 (L)  1.1    Patient Fasting for CMP? Yes  Yes  Yes  Yes    Cholesterol, Total      <200 mg/dL 130  131  135  131    HDL Cholesterol      40 - 59 mg/dL 50  54  60 (H)  57     Triglycerides      30 - 149 mg/dL 87  112  59  54    LDL Cholesterol Calc      <100 mg/dL 63  57  62  62    VLDL      0 - 30 mg/dL 13  16  9  8    NON-HDL CHOLESTEROL      <130 mg/dL 80  77  75  74    Patient Fasting for Lipid? Yes  Yes  Yes  Yes    HEMOGLOBIN A1c      <5.7 % 6.4 (H)  6.7 (H)  6.4 (H)  5.9 (H)    ESTIMATED AVERAGE GLUCOSE      68 - 126 mg/dL 137 (H)  146 (H)  137 (H)  123    T4,Free (Direct)      0.8 - 1.7 ng/dL    1.0    TSH      0.358 - 3.740 mIU/mL    1.550    Vitamin B12      193 - 986 pg/mL    1,599 (H)    VITAMIN D, 25-OH, TOTAL      30.0 - 100.0 ng/mL    68.0    PSA      <=4.00 ng/mL    0.93       Legend:  (H) High  (L) Low      Current Outpatient Medications   Medication Sig Dispense Refill    METFORMIN 500 MG Oral Tab TAKE 1 TABLET BY MOUTH TWICE  DAILY WITH MEALS 180 tablet 3    semaglutide (OZEMPIC, 1 MG/DOSE,) 4 MG/3ML Subcutaneous Solution Pen-injector Inject 1 mg into the skin once a week. 9 mL 0    amLODIPine 10 MG Oral Tab Take 1 tablet (10 mg total) by mouth daily. 90 tablet 3    lisinopril 40 MG Oral Tab Take 1 tablet (40 mg total) by mouth daily. 90 tablet 3    simvastatin 40 MG Oral Tab Take 1 tablet (40 mg total) by mouth nightly. 90 tablet 3    Glucose Blood (CONTOUR NEXT TEST) In Vitro Strip USE WITH METER TO CHECK  BLOOD SUGAR TWICE DAILY. 200 strip 3    Butalbital-Acetaminophen (BUTALBITAL-APAP)  MG Oral Tab Take 1 tablet by mouth every 4 (four) hours as needed (as needed).      Cholecalciferol (VITAMIN D) 50 MCG (2000 UT) Oral Cap Take by mouth.      cyanocobalamin 1000 MCG Oral Tab Take 1 tablet (1,000 mcg total) by mouth daily.      HYDROcodone-acetaminophen 5-300 MG Oral Tab daily as needed.      Sildenafil Citrate 100 MG Oral Tab Take 1 tablet (100 mg total) by mouth daily as needed for Erectile Dysfunction. 16 tablet 1    PREVIDENT 5000 DRY MOUTH 1.1 % Dental Gel USE IT AS A REGULAR TOOTHPASTE TID FOR 2 MINUTES.  3    Acetaminophen-Codeine #3 300-30 MG Oral  Tab Take 1-2 tablets by mouth every 6 (six) hours as needed for Pain. 30 tablet 0    Chlorhexidine Gluconate 0.12 % Mouth/Throat Solution as needed.        Aspirin 325 MG Oral Tab Take 1 tablet (325 mg total) by mouth daily.        Past Medical History:    Acute, but ill-defined, cerebrovascular disease    Arthropathy    Thoracic    Atypical mole    B12 deficiency    Back pain    Backache, unspecified    Background diabetic retinopathy(362.01)    Black stools    Bleeding nose    Capsulitis    Cataract    Cerebral embolism with cerebral infarction ([facility])    Corns and callosities    CVA (cerebral vascular accident) ([facility])    Depression    Depressive disorder, not elsewhere classified    Diabetes (HCC)    Diabetes mellitus ([facility])    Diplopia    Essential hypertension    Fatigue    Feeling lonely    Flatulence/gas pain/belching    Frequent urination    Headache    Headache(784.0)    Hyperlipidemia    Hypertension    Leg swelling    Loss of appetite    Muscle weakness (generalized)    Myofacial muscle pain    Night sweats    Obesity, unspecified    Pain in joint, shoulder region    Pain in joints    Proliferative diabetic retinopathy(362.02)    Radiculitis, cervical    Sexual dysfunction    Shortness of breath    Sleep disturbance    Stool incontinence    Stroke ([facility])    Syncope and collapse    Tendonitis    Tension headache    Thalamic infarction ([facility])    bilateral thalamic infarction    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, not stated as uncontrolled(250.50)    Uncomfortable fullness after meals    Unspecified cerebral artery occlusion with cerebral infarction    Vitreous hemorrhage ([facility])    Wears glasses    Weight loss      Past Surgical History:   Procedure Laterality Date    Colonoscopy  April 2018    Laser surgery of eye  9/2010    Other surgical history      URETHRAL DILATION    Shoulder surg proc unlisted  4/1/2010    Left subdeltoid steroid injection, Left shoulder bursitis       Social History:   Social History     Socioeconomic History    Marital status:    Tobacco Use    Smoking status: Never    Smokeless tobacco: Never   Vaping Use    Vaping status: Never Used   Substance and Sexual Activity    Alcohol use: Yes     Alcohol/week: 0.0 standard drinks of alcohol     Comment: 1 or 2 glasses of beer or wine per month, social    Drug use: No   Other Topics Concern    Caffeine Concern Yes     Comment: 2-3 cups coffee/day, large    Exercise Yes     Comment: walking   pt reports he lives with his wife  On disability after CVA  2 children   Exercise: three times per week.  Diet: watches sugar closely     REVIEW OF SYSTEMS:   GENERAL: feels well otherwise  SKIN: denies any unusual skin lesions or rashes  PULMONARY: denies cough or shortness of breath  CV: denies chest pain or palpitations  GI: denies abdominal pain, heartburn, chronic diarrhea or constipation  NEURO: denies headaches or dizziness  PSYCH: denies depression or anxiety  NUTRITION:follows diabetic diet    EXAM:   /60   Pulse 74   Resp 16   Ht 5' 4\" (1.626 m)   Wt 199 lb (90.3 kg)   SpO2 98%   BMI 34.16 kg/m²    Body mass index is 34.16 kg/m².  GENERAL: well developed, well nourished, in no apparent distress  SKIN: no rashes,no suspicious lesions  NECK: supple,no adenopathy, no thyromegaly  LUNGS: clear to auscultation, easy breathing, no cough  CV: normal S1 S2, RRR without murmur  GI: good BS's, no masses, no HSM or tenderness, no CVA tenderness   EXT: no cyanosis, clubbing or edema, bilateral foot exam unremarkable for open skin or lesions, bilateral 2+ DP, normal visual inspection bilaterally  NEURO: sensation is intact to monofilament bilaterally   Left sided weakness   PSYCH: judgement and insight are appropriate & intact      ASSESSMENT AND PLAN:   Malachi Gonzalez is a 63 year old male who presents for a recheck of his diabetes.   Discussed importance of medication, diet adherence, skin care and  routine exercise.   Reviewed good diabetic foot care.   Annual dilated eye exams reinforced. Routine accuchecks and diabetic labwork as discussed.    1. DM type 2 with diabetic peripheral neuropathy (HCC)    - Comp Metabolic Panel (14)  - Lipid Panel  - Hemoglobin A1C    2. Essential hypertension      3. History of CVA (cerebrovascular accident)      4. Elevated coronary artery calcium score      DISCUSSED SLEEP STUDY     The patient indicates understanding of these issues and agrees to the plan.  Follow up 3 month or sooner if needed

## 2024-08-31 DIAGNOSIS — E11.42 DM TYPE 2 WITH DIABETIC PERIPHERAL NEUROPATHY (HCC): ICD-10-CM

## 2024-09-03 RX ORDER — SEMAGLUTIDE 1.34 MG/ML
1 INJECTION, SOLUTION SUBCUTANEOUS WEEKLY
Qty: 9 ML | Refills: 3 | Status: SHIPPED | OUTPATIENT
Start: 2024-09-03

## 2024-10-11 ENCOUNTER — LAB ENCOUNTER (OUTPATIENT)
Dept: LAB | Age: 64
End: 2024-10-11
Attending: FAMILY MEDICINE
Payer: COMMERCIAL

## 2024-10-11 LAB
ALBUMIN SERPL-MCNC: 4.5 G/DL (ref 3.2–4.8)
ALBUMIN/GLOB SERPL: 1.8 {RATIO} (ref 1–2)
ALP LIVER SERPL-CCNC: 65 U/L
ALT SERPL-CCNC: <7 U/L
ANION GAP SERPL CALC-SCNC: 5 MMOL/L (ref 0–18)
AST SERPL-CCNC: 10 U/L (ref ?–34)
BILIRUB SERPL-MCNC: 0.7 MG/DL (ref 0.2–1.1)
BUN BLD-MCNC: 9 MG/DL (ref 9–23)
CALCIUM BLD-MCNC: 8.8 MG/DL (ref 8.7–10.4)
CHLORIDE SERPL-SCNC: 107 MMOL/L (ref 98–112)
CHOLEST SERPL-MCNC: 143 MG/DL (ref ?–200)
CO2 SERPL-SCNC: 27 MMOL/L (ref 21–32)
CREAT BLD-MCNC: 0.7 MG/DL
EGFRCR SERPLBLD CKD-EPI 2021: 103 ML/MIN/1.73M2 (ref 60–?)
EST. AVERAGE GLUCOSE BLD GHB EST-MCNC: 126 MG/DL (ref 68–126)
FASTING PATIENT LIPID ANSWER: NO
FASTING STATUS PATIENT QL REPORTED: NO
GLOBULIN PLAS-MCNC: 2.5 G/DL (ref 2–3.5)
GLUCOSE BLD-MCNC: 102 MG/DL (ref 70–99)
HBA1C MFR BLD: 6 % (ref ?–5.7)
HDLC SERPL-MCNC: 59 MG/DL (ref 40–59)
LDLC SERPL CALC-MCNC: 73 MG/DL (ref ?–100)
NONHDLC SERPL-MCNC: 84 MG/DL (ref ?–130)
OSMOLALITY SERPL CALC.SUM OF ELEC: 287 MOSM/KG (ref 275–295)
POTASSIUM SERPL-SCNC: 4.2 MMOL/L (ref 3.5–5.1)
PROT SERPL-MCNC: 7 G/DL (ref 5.7–8.2)
SODIUM SERPL-SCNC: 139 MMOL/L (ref 136–145)
TRIGL SERPL-MCNC: 51 MG/DL (ref 30–149)
VLDLC SERPL CALC-MCNC: 8 MG/DL (ref 0–30)

## 2024-10-11 PROCEDURE — 83036 HEMOGLOBIN GLYCOSYLATED A1C: CPT | Performed by: FAMILY MEDICINE

## 2024-10-11 PROCEDURE — 80061 LIPID PANEL: CPT | Performed by: FAMILY MEDICINE

## 2024-10-11 PROCEDURE — 80053 COMPREHEN METABOLIC PANEL: CPT | Performed by: FAMILY MEDICINE

## 2024-10-16 ENCOUNTER — OFFICE VISIT (OUTPATIENT)
Dept: FAMILY MEDICINE CLINIC | Facility: CLINIC | Age: 64
End: 2024-10-16
Payer: COMMERCIAL

## 2024-10-16 VITALS
BODY MASS INDEX: 33.97 KG/M2 | HEIGHT: 64 IN | SYSTOLIC BLOOD PRESSURE: 122 MMHG | DIASTOLIC BLOOD PRESSURE: 64 MMHG | WEIGHT: 199 LBS | OXYGEN SATURATION: 98 % | RESPIRATION RATE: 16 BRPM

## 2024-10-16 DIAGNOSIS — Z23 NEED FOR VACCINATION: ICD-10-CM

## 2024-10-16 DIAGNOSIS — Z00.00 ANNUAL PHYSICAL EXAM: Primary | ICD-10-CM

## 2024-10-16 DIAGNOSIS — I10 ESSENTIAL HYPERTENSION: ICD-10-CM

## 2024-10-16 DIAGNOSIS — I69.354 HEMIPARESIS AFFECTING LEFT SIDE AS LATE EFFECT OF STROKE (HCC): ICD-10-CM

## 2024-10-16 DIAGNOSIS — E11.42 DM TYPE 2 WITH DIABETIC PERIPHERAL NEUROPATHY (HCC): ICD-10-CM

## 2024-10-16 DIAGNOSIS — R93.1 ELEVATED CORONARY ARTERY CALCIUM SCORE: ICD-10-CM

## 2024-10-16 DIAGNOSIS — Z86.73 HISTORY OF CVA (CEREBROVASCULAR ACCIDENT): ICD-10-CM

## 2024-10-16 PROCEDURE — 99396 PREV VISIT EST AGE 40-64: CPT | Performed by: FAMILY MEDICINE

## 2024-10-16 PROCEDURE — 3078F DIAST BP <80 MM HG: CPT | Performed by: FAMILY MEDICINE

## 2024-10-16 PROCEDURE — 3008F BODY MASS INDEX DOCD: CPT | Performed by: FAMILY MEDICINE

## 2024-10-16 PROCEDURE — 99214 OFFICE O/P EST MOD 30 MIN: CPT | Performed by: FAMILY MEDICINE

## 2024-10-16 PROCEDURE — 90656 IIV3 VACC NO PRSV 0.5 ML IM: CPT | Performed by: FAMILY MEDICINE

## 2024-10-16 PROCEDURE — 3044F HG A1C LEVEL LT 7.0%: CPT | Performed by: FAMILY MEDICINE

## 2024-10-16 PROCEDURE — 90471 IMMUNIZATION ADMIN: CPT | Performed by: FAMILY MEDICINE

## 2024-10-16 PROCEDURE — 3074F SYST BP LT 130 MM HG: CPT | Performed by: FAMILY MEDICINE

## 2024-10-16 NOTE — PROGRESS NOTES
Malachi Gonzalez is a 63 year old male who presents for a complete physical exam.   HPI:   Pt complains of nothing .  + calcium and vit D   No urinary symptoms  + erectile dysfunction- on viagra   No penile discharge  No snoring // discussed JOMAR // discussed sleep studay   No family h/o colon or prostate cancer  c-scope 2018     Wt Readings from Last 6 Encounters:   10/16/24 199 lb (90.3 kg)   07/15/24 199 lb (90.3 kg)   03/20/24 212 lb (96.2 kg)   12/20/23 212 lb (96.2 kg)   10/26/23 223 lb 12.8 oz (101.5 kg)   09/20/23 225 lb (102.1 kg)     Body mass index is 34.16 kg/m².     Cholesterol, Total (mg/dL)   Date Value   10/11/2024 143   07/12/2024 131   03/19/2024 135     CHOLESTEROL, TOTAL (mg/dL)   Date Value   02/14/2019 139   11/20/2018 123   06/21/2018 131     HDL Cholesterol (mg/dL)   Date Value   10/11/2024 59   07/12/2024 57   03/19/2024 60 (H)     HDL CHOLESTEROL (mg/dL)   Date Value   02/14/2019 57   11/20/2018 53   06/21/2018 65     LDL Cholesterol (mg/dL)   Date Value   10/11/2024 73   07/12/2024 62   03/19/2024 62     LDL-CHOLESTEROL (mg/dL (calc))   Date Value   02/14/2019 68   11/20/2018 53   06/21/2018 56     AST (U/L)   Date Value   10/11/2024 10   07/12/2024 8 (L)   03/19/2024 7 (L)   02/14/2019 9 (L)   11/20/2018 8 (L)   06/21/2018 10     ALT (U/L)   Date Value   10/11/2024 <7 (L)   07/12/2024 11 (L)   03/19/2024 7 (L)   02/14/2019 4 (L)   11/20/2018 3 (L)   06/21/2018 5 (L)      Current Outpatient Medications   Medication Sig Dispense Refill    CONTOUR NEXT TEST In Vitro Strip USE WITH METER TO CHECK BLOOD  SUGAR TWICE DAILY 200 strip 3    OZEMPIC, 1 MG/DOSE, 4 MG/3ML Subcutaneous Solution Pen-injector INJECT SUBCUTANEOUSLY 1 MG EVERY WEEK 9 mL 3    METFORMIN 500 MG Oral Tab TAKE 1 TABLET BY MOUTH TWICE  DAILY WITH MEALS 180 tablet 3    amLODIPine 10 MG Oral Tab Take 1 tablet (10 mg total) by mouth daily. 90 tablet 3    lisinopril 40 MG Oral Tab Take 1 tablet (40 mg total) by mouth daily.  90 tablet 3    simvastatin 40 MG Oral Tab Take 1 tablet (40 mg total) by mouth nightly. 90 tablet 3    Butalbital-Acetaminophen (BUTALBITAL-APAP)  MG Oral Tab Take 1 tablet by mouth every 4 (four) hours as needed (as needed).      Cholecalciferol (VITAMIN D) 50 MCG (2000 UT) Oral Cap Take by mouth.      cyanocobalamin 1000 MCG Oral Tab Take 1 tablet (1,000 mcg total) by mouth daily.      HYDROcodone-acetaminophen 5-300 MG Oral Tab daily as needed.      Sildenafil Citrate 100 MG Oral Tab Take 1 tablet (100 mg total) by mouth daily as needed for Erectile Dysfunction. 16 tablet 1    PREVIDENT 5000 DRY MOUTH 1.1 % Dental Gel USE IT AS A REGULAR TOOTHPASTE TID FOR 2 MINUTES.  3    Acetaminophen-Codeine #3 300-30 MG Oral Tab Take 1-2 tablets by mouth every 6 (six) hours as needed for Pain. 30 tablet 0    Chlorhexidine Gluconate 0.12 % Mouth/Throat Solution as needed.        Aspirin 325 MG Oral Tab Take 1 tablet (325 mg total) by mouth daily.        Past Medical History:    Acute, but ill-defined, cerebrovascular disease    Arthropathy    Thoracic    Atypical mole    B12 deficiency    Back pain    Backache, unspecified    Background diabetic retinopathy(362.01)    Black stools    Bleeding nose    Capsulitis    Cataract    Cerebral embolism with cerebral infarction (HCC)    Corns and callosities    CVA (cerebral vascular accident) (HCC)    Depression    Depressive disorder, not elsewhere classified    Diabetes (HCC)    Diabetes mellitus (HCC)    Diplopia    Essential hypertension    Fatigue    Feeling lonely    Flatulence/gas pain/belching    Frequent urination    Headache    Headache(784.0)    Hyperlipidemia    Hypertension    Leg swelling    Loss of appetite    Muscle weakness (generalized)    Myofacial muscle pain    Night sweats    Obesity, unspecified    Pain in joint, shoulder region    Pain in joints    Proliferative diabetic retinopathy(362.02)    Radiculitis, cervical    Sexual dysfunction    Shortness of  breath    Sleep disturbance    Stool incontinence    Stroke (HCC)    Syncope and collapse    Tendonitis    Tension headache    Thalamic infarction (HCC)    bilateral thalamic infarction    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, not stated as uncontrolled(250.50)    Uncomfortable fullness after meals    Unspecified cerebral artery occlusion with cerebral infarction    Vitreous hemorrhage (HCC)    Wears glasses    Weight loss      Past Surgical History:   Procedure Laterality Date    Colonoscopy  April 2018    Laser surgery of eye  9/2010    Other surgical history      URETHRAL DILATION    Shoulder surg proc unlisted  4/1/2010    Left subdeltoid steroid injection, Left shoulder bursitis      Family History   Problem Relation Age of Onset    Other (MI [Other]) Father     Heart Disease Father     Prostate Cancer Father     Other (Liver ca [Other]) Mother     Other (Ovarian ca [Other]) Mother     Uterine Cancer Mother     Other (Colon ca [Other]) Other         fam hx    Arthritis Other         fam hx    Stroke Maternal Grandfather     Heart Disease Maternal Grandfather       Social History:  Social History     Socioeconomic History    Marital status:    Tobacco Use    Smoking status: Never    Smokeless tobacco: Never   Vaping Use    Vaping status: Never Used   Substance and Sexual Activity    Alcohol use: Yes     Alcohol/week: 0.0 standard drinks of alcohol     Comment: 1 or 2 glasses of beer or wine per month, social    Drug use: No   Other Topics Concern    Caffeine Concern Yes     Comment: 2-3 cups coffee/day, large    Exercise Yes     Comment: walking      Occ: . : . Children:   On disability   Exercise: minimal.  Diet: watches sugar closely     REVIEW OF SYSTEMS:   GENERAL: feels well otherwise  SKIN: denies any unusual skin lesions  EYES:denies blurred vision or double vision  HEENT: denies nasal congestion, sinus pain or ST  LUNGS: denies shortness of breath  with exertion  CARDIOVASCULAR: denies chest pain on exertion  GI: denies abdominal pain,denies heartburn  : denies nocturia or changes in stream  MUSCULOSKELETAL: denies back pain  NEURO: denies headaches  PSYCHE: denies depression or anxiety  HEMATOLOGIC: denies hx of anemia  ENDOCRINE: denies thyroid history  ALL/ASTHMA: denies asthma    EXAM:   /64   Resp 16   Ht 5' 4\" (1.626 m)   Wt 199 lb (90.3 kg)   SpO2 98%   BMI 34.16 kg/m²   Body mass index is 34.16 kg/m².   GENERAL: well developed, well nourished,in no apparent distress  SKIN: no rashes,no suspicious lesions  HEENT: atraumatic, normocephalic,ears and throat are clear  EYES:PERRLA, EOMI, normal optic disk,conjunctiva are clear  NECK: supple,no adenopathy,no bruits  CHEST: no chest tenderness  BREAST: no dominant or suspicious mass  LUNGS: clear to auscultation  CARDIO: RRR without murmur  GI: good BS's,no masses, HSM or tenderness  : two descended testes,no masses,no hernia,no penile lesions  RECTAL: good rectal tone, prostate shows no masses, stool is OB negative  MUSCULOSKELETAL: back is not tender,FROM of the back  EXTREMITIES: no cyanosis, clubbing or edema  NEURO: Oriented times three,cranial nerves are intact,motor and sensory are grossly intact, left sided weakness     ASSESSMENT AND PLAN:   Malachi Gonzalez is a 63 year old male who presents for a complete physical exam.     1. Annual physical exam    - Lipid Panel  - Comp Metabolic Panel (14)  - Hemoglobin A1C  - Microalb/Creat Ratio, Random Urine      Discussed diet, exercise, calcium, vit D and fish oil.    The patient indicates understanding of these issues and agrees to the plan.  The patient is asked to return in 3 months or sooner if needed.          HPI:   Malachi Gonzalez is a 63 year old male who presents for recheck of his diabetes       No sleep study thus far     Patient’s 's   Eating later in the evening   On ozempic 1mg / declines increase   Good  weight loss   No SE   Walking some     Malachi has been checking his feet on a regular basis.   Malachi denies any tingling of the feet.   Pt complains of poor balance; pt uses a cane since his CVA.    Last eye exam:  Dr. Ritchie h/o retinopathy and Dr gomez     Seeing neuro 2x per year  Last saw cards 2 years ago     Patient denies chest pain, shortness of breath, dizziness, and lightheadedness. No exertional symptoms.    Discussed labs     Current Outpatient Medications   Medication Sig Dispense Refill    CONTOUR NEXT TEST In Vitro Strip USE WITH METER TO CHECK BLOOD  SUGAR TWICE DAILY 200 strip 3    OZEMPIC, 1 MG/DOSE, 4 MG/3ML Subcutaneous Solution Pen-injector INJECT SUBCUTANEOUSLY 1 MG EVERY WEEK 9 mL 3    METFORMIN 500 MG Oral Tab TAKE 1 TABLET BY MOUTH TWICE  DAILY WITH MEALS 180 tablet 3    amLODIPine 10 MG Oral Tab Take 1 tablet (10 mg total) by mouth daily. 90 tablet 3    lisinopril 40 MG Oral Tab Take 1 tablet (40 mg total) by mouth daily. 90 tablet 3    simvastatin 40 MG Oral Tab Take 1 tablet (40 mg total) by mouth nightly. 90 tablet 3    Butalbital-Acetaminophen (BUTALBITAL-APAP)  MG Oral Tab Take 1 tablet by mouth every 4 (four) hours as needed (as needed).      Cholecalciferol (VITAMIN D) 50 MCG (2000 UT) Oral Cap Take by mouth.      cyanocobalamin 1000 MCG Oral Tab Take 1 tablet (1,000 mcg total) by mouth daily.      HYDROcodone-acetaminophen 5-300 MG Oral Tab daily as needed.      Sildenafil Citrate 100 MG Oral Tab Take 1 tablet (100 mg total) by mouth daily as needed for Erectile Dysfunction. 16 tablet 1    PREVIDENT 5000 DRY MOUTH 1.1 % Dental Gel USE IT AS A REGULAR TOOTHPASTE TID FOR 2 MINUTES.  3    Acetaminophen-Codeine #3 300-30 MG Oral Tab Take 1-2 tablets by mouth every 6 (six) hours as needed for Pain. 30 tablet 0    Chlorhexidine Gluconate 0.12 % Mouth/Throat Solution as needed.        Aspirin 325 MG Oral Tab Take 1 tablet (325 mg total) by mouth daily.        Past Medical History:     Acute, but ill-defined, cerebrovascular disease    Arthropathy    Thoracic    Atypical mole    B12 deficiency    Back pain    Backache, unspecified    Background diabetic retinopathy(362.01)    Black stools    Bleeding nose    Capsulitis    Cataract    Cerebral embolism with cerebral infarction (HCC)    Corns and callosities    CVA (cerebral vascular accident) (HCC)    Depression    Depressive disorder, not elsewhere classified    Diabetes (HCC)    Diabetes mellitus (HCC)    Diplopia    Essential hypertension    Fatigue    Feeling lonely    Flatulence/gas pain/belching    Frequent urination    Headache    Headache(784.0)    Hyperlipidemia    Hypertension    Leg swelling    Loss of appetite    Muscle weakness (generalized)    Myofacial muscle pain    Night sweats    Obesity, unspecified    Pain in joint, shoulder region    Pain in joints    Proliferative diabetic retinopathy(362.02)    Radiculitis, cervical    Sexual dysfunction    Shortness of breath    Sleep disturbance    Stool incontinence    Stroke (HCC)    Syncope and collapse    Tendonitis    Tension headache    Thalamic infarction (HCC)    bilateral thalamic infarction    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, not stated as uncontrolled(250.50)    Uncomfortable fullness after meals    Unspecified cerebral artery occlusion with cerebral infarction    Vitreous hemorrhage ([facility])    Wears glasses    Weight loss      Past Surgical History:   Procedure Laterality Date    Colonoscopy  April 2018    Laser surgery of eye  9/2010    Other surgical history      URETHRAL DILATION    Shoulder surg proc unlisted  4/1/2010    Left subdeltoid steroid injection, Left shoulder bursitis      Social History:   Social History     Socioeconomic History    Marital status:    Tobacco Use    Smoking status: Never    Smokeless tobacco: Never   Vaping Use    Vaping status: Never Used   Substance and Sexual Activity    Alcohol use: Yes      Alcohol/week: 0.0 standard drinks of alcohol     Comment: 1 or 2 glasses of beer or wine per month, social    Drug use: No   Other Topics Concern    Caffeine Concern Yes     Comment: 2-3 cups coffee/day, large    Exercise Yes     Comment: walking   pt reports he lives with his wife  On disability after CVA  2 children   Exercise: three times per week.  Diet: watches sugar closely     REVIEW OF SYSTEMS:   GENERAL: feels well otherwise  SKIN: denies any unusual skin lesions or rashes  PULMONARY: denies cough or shortness of breath  CV: denies chest pain or palpitations  GI: denies abdominal pain, heartburn, chronic diarrhea or constipation  NEURO: denies headaches or dizziness  PSYCH: denies depression or anxiety  NUTRITION:follows diabetic diet    EXAM:   /63   Resp 16   Ht 5' 4\" (1.626 m)   Wt 199 lb (90.3 kg)   SpO2 98%   BMI 34.16 kg/m²    Body mass index is 34.16 kg/m².  GENERAL: well developed, well nourished, in no apparent distress  SKIN: no rashes,no suspicious lesions  NECK: supple,no adenopathy, no thyromegaly  LUNGS: clear to auscultation, easy breathing, no cough  CV: normal S1 S2, RRR without murmur  GI: good BS's, no masses, no HSM or tenderness, no CVA tenderness   EXT: no cyanosis, clubbing or edema, bilateral foot exam unremarkable for open skin or lesions, bilateral 2+ DP, normal visual inspection bilaterally  NEURO: sensation is intact to monofilament bilaterally   Left sided weakness   PSYCH: judgement and insight are appropriate & intact      ASSESSMENT AND PLAN:   Malachi Gonzalez is a 63 year old male who presents for a recheck of his diabetes.   Discussed importance of medication, diet adherence, skin care and routine exercise.   Reviewed good diabetic foot care.   Annual dilated eye exams reinforced. Routine accuchecks and diabetic labwork as discussed.    2. DM type 2 with diabetic peripheral neuropathy (HCC)      3. Essential hypertension      4. History of CVA  (cerebrovascular accident)      5. Elevated coronary artery calcium score      6. Hemiparesis affecting left side as late effect of stroke (HCC)        DISCUSSED SLEEP STUDY     The patient indicates understanding of these issues and agrees to the plan.  Follow up 3 month or sooner if needed

## 2024-10-24 ENCOUNTER — OFFICE VISIT (OUTPATIENT)
Dept: NEUROLOGY | Facility: CLINIC | Age: 64
End: 2024-10-24
Payer: COMMERCIAL

## 2024-10-24 ENCOUNTER — TELEPHONE (OUTPATIENT)
Dept: NEUROLOGY | Facility: CLINIC | Age: 64
End: 2024-10-24

## 2024-10-24 VITALS
OXYGEN SATURATION: 98 % | WEIGHT: 198 LBS | BODY MASS INDEX: 34 KG/M2 | RESPIRATION RATE: 16 BRPM | SYSTOLIC BLOOD PRESSURE: 108 MMHG | DIASTOLIC BLOOD PRESSURE: 64 MMHG | HEART RATE: 68 BPM

## 2024-10-24 DIAGNOSIS — R26.9 GAIT ABNORMALITY: ICD-10-CM

## 2024-10-24 DIAGNOSIS — R41.3 MEMORY IMPAIRMENT: ICD-10-CM

## 2024-10-24 DIAGNOSIS — Z86.73 HISTORY OF STROKE: ICD-10-CM

## 2024-10-24 DIAGNOSIS — G44.229 CHRONIC TENSION-TYPE HEADACHE, NOT INTRACTABLE: Primary | ICD-10-CM

## 2024-10-24 PROCEDURE — 3078F DIAST BP <80 MM HG: CPT | Performed by: OTHER

## 2024-10-24 PROCEDURE — 3074F SYST BP LT 130 MM HG: CPT | Performed by: OTHER

## 2024-10-24 PROCEDURE — 99213 OFFICE O/P EST LOW 20 MIN: CPT | Performed by: OTHER

## 2024-10-24 NOTE — TELEPHONE ENCOUNTER
Patient brought in Disability paper work to be completed. Had patient sign Release of Information. Paper work sent to Forms Department.

## 2024-10-24 NOTE — PATIENT INSTRUCTIONS
After your visit at the [place] office  today, please direct any follow up questions or medication needs to the staff in our [place] office so that your concerns may be promptly addressed.  We are available through [employer] or at the numbers below:    The phone number is:   [phone], option 1    The fax number is:  [phone]    Your pharmacy should also send any requests electronically to the [place] office.       Refill policies:    Allow 2-3 business days for refills; controlled substances may take longer.  Contact your pharmacy at least 5 days prior to running out of medication and have them send an electronic request or submit request through the “request refill” option in your [employer] account.  Refills are not addressed on weekends; covering physicians do not authorize routine medications on weekends.  No narcotics or controlled substances are refilled after noon on Fridays or by on call physicians.  By law, narcotics must be electronically prescribed.  A 30 day supply with no refills is the maximum allowed.  If your prescription is due for a refill, you may be due for a follow up appointment.  To best provide you care, patients receiving routine medications need to be seen at least once a year.  Patients receiving narcotic/controlled substance medications need to be seen at least once every 3 months.  In the event that your preferred pharmacy does not have the requested medication in stock (e.g. Backordered), it is your responsibility to find another pharmacy that has the requested medication available.  We will gladly send a new prescription to that pharmacy at your request.    Scheduling Tests:    If your physician has ordered radiology tests such as MRI or CT scans, please contact Central Scheduling at [phone] right away to schedule the test.  Once scheduled, the [facility] Centralized Referral Team will work with your insurance carrier to obtain pre-certification or prior authorization.   Depending on your insurance carrier, approval may take 3-10 days.  It is highly recommended patients assure they have received an authorization before having a test performed.  If test is done without insurance authorization, patient may be responsible for the entire amount billed.      Precertification and Prior Authorizations:  If your physician has recommended that you have a procedure or additional testing performed the [facility] Centralized Referral Team will contact your insurance carrier to obtain pre-certification or prior authorization.    You are strongly encouraged to contact your insurance carrier to verify that your procedure/test has been approved and is a COVERED benefit.  Although the [facility] Centralized Referral Team does its due diligence, the insurance carrier gives the disclaimer that \"Although the procedure is authorized, this does not guarantee payment.\"    Ultimately the patient is responsible for payment.   Thank you for your understanding in this matter.  Paperwork Completion:  If you require FMLA or disability paperwork for your recovery, please make sure to either drop it off or have it faxed to our office at [phone]. Be sure the form has your name and date of birth on it.  The form will be faxed to our Forms Department and they will complete it for you.  There is a 25$ fee for all forms that need to be filled out.  Please be aware there is a 10-14 day turnaround time.  You will need to sign a release of information (NITHIN) form if your paperwork does not come with one.  You may call the Forms Department with any questions at [phone].  Their fax number is [phone].

## 2024-10-24 NOTE — PROGRESS NOTES
HPI:    Patient ID: Malachi Gonzalez is a 63 year old male.  PCP: Dr Daily    Headache   Associated symptoms include back pain and weakness.   Neurologic Problem  The patient's primary symptoms include weakness. Associated symptoms include back pain and headaches.     Patient is a 63-year-old male who presented for follow-up for multiple neurological problem. Since last seen headaches got better so he discontinued Gabapentin. States headaches then return back due to sleep deprivation and stress ( his mother-in-law was hospitalized so he was staying with her in the hospital in the night and now she passed away)  PCP gave him Fioricet and it is helping the headaches.  Back pain is stable.   C/o short term memory problem, memory got affected after the stroke but has progressed. He is writing notes and reminders.         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  He also has a history of chronic back pain for past couple weeks it has gotten worse to the point that he could not stand.  He also complains of back spasms.  At the moment there is no radicular leg pain but does get some intermittent sciatica.  He is currently working with physical therapy.  MRI of the lumbar spine showed multilevel mild degenerative disease more pronounced at L4-L5 levels  He further reports that still gets headache in the back of the head going down to the neck associated with some mild neck stiffness.  He was on Topamax for few years but it was not effective.  He is no longer taking Topamax.  States he gets headaches about 1 to 2/weeks and manages with pain medication. Has gait problem since the stroke, stays balance is getting poor, sways and unsteady gait.  He uses a cane for support.    HISTORY:  Past Medical History:    Acute, but ill-defined, cerebrovascular disease    Arthropathy    Thoracic    Atypical mole     B12 deficiency    Back pain    Backache, unspecified    Background diabetic retinopathy(362.01)    Black stools    Bleeding nose    Capsulitis    Cataract    Cerebral embolism with cerebral infarction (HCC)    Corns and callosities    CVA (cerebral vascular accident) (HCC)    Depression    Depressive disorder, not elsewhere classified    Diabetes (HCC)    Diabetes mellitus (HCC)    Diplopia    Essential hypertension    Fatigue    Feeling lonely    Flatulence/gas pain/belching    Frequent urination    Headache    Headache(784.0)    Hyperlipidemia    Hypertension    Leg swelling    Loss of appetite    Muscle weakness (generalized)    Myofacial muscle pain    Night sweats    Obesity, unspecified    Pain in joint, shoulder region    Pain in joints    Proliferative diabetic retinopathy(362.02)    Radiculitis, cervical    Sexual dysfunction    Shortness of breath    Sleep disturbance    Stool incontinence    Stroke (HCC)    Syncope and collapse    Tendonitis    Tension headache    Thalamic infarction (HCC)    bilateral thalamic infarction    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, not stated as uncontrolled(250.50)    Uncomfortable fullness after meals    Unspecified cerebral artery occlusion with cerebral infarction    Vitreous hemorrhage (HCC)    Wears glasses    Weight loss      Past Surgical History:   Procedure Laterality Date    Colonoscopy  April 2018    Laser surgery of eye  9/2010    Other surgical history      URETHRAL DILATION    Shoulder surg proc unlisted  4/1/2010    Left subdeltoid steroid injection, Left shoulder bursitis      Family History   Problem Relation Age of Onset    Other (MI [Other]) Father     Heart Disease Father     Prostate Cancer Father     Other (Liver ca [Other]) Mother     Other (Ovarian ca [Other]) Mother     Uterine Cancer Mother     Other (Colon ca [Other]) Other         fam hx    Arthritis Other         fam hx    Stroke Maternal Grandfather      Heart Disease Maternal Grandfather       Social History     Socioeconomic History    Marital status:    Tobacco Use    Smoking status: Never    Smokeless tobacco: Never   Vaping Use    Vaping status: Never Used   Substance and Sexual Activity    Alcohol use: Yes     Alcohol/week: 0.0 standard drinks of alcohol     Comment: 1 or 2 glasses of beer or wine per month, social    Drug use: No   Other Topics Concern    Caffeine Concern Yes     Comment: 2-3 cups coffee/day, large    Exercise Yes     Comment: walking        Review of Systems   Constitutional: Negative.    HENT: Negative.     Eyes: Negative.    Respiratory: Negative.     Cardiovascular: Negative.    Gastrointestinal: Negative.    Endocrine: Negative.    Genitourinary: Negative.    Musculoskeletal:  Positive for back pain and gait problem.   Skin: Negative.    Allergic/Immunologic: Negative.    Neurological:  Positive for weakness and headaches.   Hematological: Negative.    Psychiatric/Behavioral: Negative.     All other systems reviewed and are negative.           Current Outpatient Medications   Medication Sig Dispense Refill    CONTOUR NEXT TEST In Vitro Strip USE WITH METER TO CHECK BLOOD  SUGAR TWICE DAILY 200 strip 3    OZEMPIC, 1 MG/DOSE, 4 MG/3ML Subcutaneous Solution Pen-injector INJECT SUBCUTANEOUSLY 1 MG EVERY WEEK 9 mL 3    METFORMIN 500 MG Oral Tab TAKE 1 TABLET BY MOUTH TWICE  DAILY WITH MEALS 180 tablet 3    amLODIPine 10 MG Oral Tab Take 1 tablet (10 mg total) by mouth daily. 90 tablet 3    lisinopril 40 MG Oral Tab Take 1 tablet (40 mg total) by mouth daily. 90 tablet 3    simvastatin 40 MG Oral Tab Take 1 tablet (40 mg total) by mouth nightly. 90 tablet 3    Butalbital-Acetaminophen (BUTALBITAL-APAP)  MG Oral Tab Take 1 tablet by mouth every 4 (four) hours as needed (as needed).      Cholecalciferol (VITAMIN D) 50 MCG (2000 UT) Oral Cap Take by mouth.      cyanocobalamin 1000 MCG Oral Tab Take 1 tablet (1,000 mcg total) by  mouth daily.      HYDROcodone-acetaminophen 5-300 MG Oral Tab daily as needed.      Sildenafil Citrate 100 MG Oral Tab Take 1 tablet (100 mg total) by mouth daily as needed for Erectile Dysfunction. 16 tablet 1    PREVIDENT 5000 DRY MOUTH 1.1 % Dental Gel USE IT AS A REGULAR TOOTHPASTE TID FOR 2 MINUTES.  3    Acetaminophen-Codeine #3 300-30 MG Oral Tab Take 1-2 tablets by mouth every 6 (six) hours as needed for Pain. 30 tablet 0    Chlorhexidine Gluconate 0.12 % Mouth/Throat Solution as needed.        Aspirin 325 MG Oral Tab Take 1 tablet (325 mg total) by mouth daily.       Allergies:  Allergies   Allergen Reactions    Pcn [Penicillins] UNKNOWN    Sulfa Antibiotics UNKNOWN    Sulfa Drugs Cross Reactors UNKNOWN    Sulfites UNKNOWN     PHYSICAL EXAM:   Physical Exam  Blood pressure 108/64, pulse 68, resp. rate 16, weight 198 lb (89.8 kg), SpO2 98%.      General Appearance: Well nourished, well developed, no apparent distress.   HEENT: Normocephalic and atraumatic.   Cardiovascular: Normal rate, regular rhythm and normal heart sounds.    Pulmonary/Chest: Effort normal and breath sounds normal.   Abdominal: Soft. Bowel sounds are normal.   Musculoskeletal: + low lumbar paraspinal tenderness  Ext: peripheral  pulses present  Psych: normal mood and affect    Neurological: Patient is awake, alert and oriented to person, place and time   Normal memory, attention/concentration, speech and language.    Cranial Nerves:   II: Visual acuity: normal  III: Pupils: equal, round, reactive to light  III,IV,VI: Extra Ocular Movements: intact  V: Facial sensation: intact  VII: Facial strength: intact  VIII: Hearing: intact  IX: Palate: intact  XI: Shoulder shrug: intact  XII: Tongue movement: normal    Motor : Normal tone. Strength is  5 out of 5 except LLE 4/5 proximally and distally 3/5 left foot weakness  Sensory: Sensory examination is normal to light touch and pinprick   Coordination: Finger-to-nose test normal bilaterally  without evidence of dysmetria.  Gait: unsteady, hemiparetic gait.     TESTS/IMAGING:     MRI lumbar spine-9/14/2022  1. No acute process.   2. Multilevel disc disease and facet arthropathy which is relatively mild in degree.  Disease is most pronounced at L4-5 where there is secondary mild central canal stenosis , mild bilateral subarticular and neural foraminal stenosis.  No foci of   high-grade stenosis noted.   3. Small cyst along the inferior pole the left kidney, incompletely evaluated on this exam measuring 1.4 cm.     ASSESSMENT/PLAN:     (G44.229) Chronic tension-type headache, not intractable  (primary encounter diagnosis)      (M51.36) Lumbar degenerative disc disease      (Z86.73) History of stroke      1.. Chronic tension type headaches  Improved. Patient discontinue Gabapentin on his own      2. Mild multilevel lumbar DJD  Continue conservative management.  Physical therapy completed    3. Old right hemispheric stroke with residual left leg weakness and gait abnormality  Continue Asa 81 mg daily   Continue Lipitor 40 mg daily  Optimal risk factor control      Follow up in about 12 months  See orders and medications filed with this encounter. The patient indicates understanding of these issues and agrees with the plan.        William Santiago MD  [facility]      Meds This Visit:  Requested Prescriptions      No prescriptions requested or ordered in this encounter       Imaging & Referrals:  None     ID#[id number]

## 2024-10-25 ENCOUNTER — TELEPHONE (OUTPATIENT)
Dept: SURGERY | Facility: CLINIC | Age: 64
End: 2024-10-25

## 2024-10-25 NOTE — TELEPHONE ENCOUNTER
Received by fax attending physician statement from [employer] .  Forwarded to forms department copy of form scanned and forwarded to forms department thru outlook, original sent thru intercompany mail.  No fee collected.

## 2024-11-07 NOTE — TELEPHONE ENCOUNTER
Called patient to obtain clarification on details.  Patient was upset as to the reason why Forms Dept is calling him again. Informed to patient last form that was completed (scanned into his chart) was on 2022 by a different provider. Reason we were calling back to clarify first day of disability and if this will be of a year off? As there is no documentation from Dr. Santiago indicating patient is to be off.    Patient continued to raise his voice stating he did not authorize Forms Dept to go into his chart. Tried to explain to patient the process and why we need to be in his chart. Patient continued to state he does not authorize Forms Dept in his chart. Informed patient that I will be exiting his chart and to hand forms to Dr Santiago to complete.   Patient requested to talk to the President of [facility]. I informed the patient that I do not have that number but I will be letting my supervisor know about this situation.

## 2024-11-07 NOTE — TELEPHONE ENCOUNTER
Patient called back to provide details for long term disability     Type of Leave: long term disability (re-cert)   Reason for Leave: stroke   Start date of leave:  Same as previous form  End date of leave: unknown   How many flare ups per month/length?:  How many appts per month/length?:   Was Fee and Turnaround info Given?: Yes

## 2024-11-18 NOTE — TELEPHONE ENCOUNTER
Agent/Nurse in office called to check status, explained the below and she will adv patient who called her to check on things why there is not further progress on the forms.

## 2024-12-04 ENCOUNTER — OFFICE VISIT (OUTPATIENT)
Dept: FAMILY MEDICINE CLINIC | Facility: CLINIC | Age: 64
End: 2024-12-04
Payer: MEDICARE

## 2024-12-04 VITALS
HEIGHT: 60 IN | SYSTOLIC BLOOD PRESSURE: 114 MMHG | WEIGHT: 198 LBS | DIASTOLIC BLOOD PRESSURE: 60 MMHG | BODY MASS INDEX: 38.87 KG/M2 | HEART RATE: 68 BPM

## 2024-12-04 DIAGNOSIS — H44.001 INFECTION OF RIGHT EYE: ICD-10-CM

## 2024-12-04 DIAGNOSIS — H00.011 HORDEOLUM EXTERNUM OF RIGHT UPPER EYELID: Primary | ICD-10-CM

## 2024-12-04 PROCEDURE — 99213 OFFICE O/P EST LOW 20 MIN: CPT | Performed by: FAMILY MEDICINE

## 2024-12-04 RX ORDER — OFLOXACIN 3 MG/ML
1 SOLUTION/ DROPS OPHTHALMIC EVERY 4 HOURS
Qty: 1 EACH | Refills: 0 | Status: SHIPPED | OUTPATIENT
Start: 2024-12-04

## 2024-12-04 RX ORDER — CEFDINIR 300 MG/1
300 CAPSULE ORAL 2 TIMES DAILY
Qty: 14 CAPSULE | Refills: 0 | Status: SHIPPED | OUTPATIENT
Start: 2024-12-04 | End: 2024-12-11

## 2024-12-04 NOTE — PROGRESS NOTES
Subjective:   Patient ID: Malachi Gonzalez is a 64 year old male.    Saturday night went to see [employer] and felt itchy then.  No pain.  Mild itching.  Left eye almost swollen shut mostly upper eyelid.  Milky discharge. No vision changes when opens his eye.      History/Other:   Review of Systems   All other systems reviewed and are negative.    Current Outpatient Medications   Medication Sig Dispense Refill    cefdinir 300 MG Oral Cap Take 1 capsule (300 mg total) by mouth 2 (two) times daily for 7 days. 14 capsule 0    ofloxacin 0.3 % Ophthalmic Solution Place 1 drop into the right eye every 4 (four) hours. 1 each 0    CONTOUR NEXT TEST In Vitro Strip USE WITH METER TO CHECK BLOOD  SUGAR TWICE DAILY 200 strip 3    OZEMPIC, 1 MG/DOSE, 4 MG/3ML Subcutaneous Solution Pen-injector INJECT SUBCUTANEOUSLY 1 MG EVERY WEEK 9 mL 3    METFORMIN 500 MG Oral Tab TAKE 1 TABLET BY MOUTH TWICE  DAILY WITH MEALS 180 tablet 3    amLODIPine 10 MG Oral Tab Take 1 tablet (10 mg total) by mouth daily. 90 tablet 3    lisinopril 40 MG Oral Tab Take 1 tablet (40 mg total) by mouth daily. 90 tablet 3    simvastatin 40 MG Oral Tab Take 1 tablet (40 mg total) by mouth nightly. 90 tablet 3    Butalbital-Acetaminophen (BUTALBITAL-APAP)  MG Oral Tab Take 1 tablet by mouth every 4 (four) hours as needed (as needed).      Cholecalciferol (VITAMIN D) 50 MCG (2000 UT) Oral Cap Take by mouth.      cyanocobalamin 1000 MCG Oral Tab Take 1 tablet (1,000 mcg total) by mouth daily.      HYDROcodone-acetaminophen 5-300 MG Oral Tab daily as needed.      Sildenafil Citrate 100 MG Oral Tab Take 1 tablet (100 mg total) by mouth daily as needed for Erectile Dysfunction. 16 tablet 1    PREVIDENT 5000 DRY MOUTH 1.1 % Dental Gel USE IT AS A REGULAR TOOTHPASTE TID FOR 2 MINUTES.  3    Acetaminophen-Codeine #3 300-30 MG Oral Tab Take 1-2 tablets by mouth every 6 (six) hours as needed for Pain. 30 tablet 0    Chlorhexidine Gluconate 0.12 % Mouth/Throat  Solution as needed.        Aspirin 325 MG Oral Tab Take 1 tablet (325 mg total) by mouth daily.       Allergies:Allergies[1]    Objective:   Physical Exam  Vitals reviewed.   Constitutional:       General: He is not in acute distress.     Appearance: He is well-developed. He is not diaphoretic.   Eyes:      General: No scleral icterus.        Right eye: No discharge.         Left eye: No discharge.      Conjunctiva/sclera: Conjunctivae normal.   Cardiovascular:      Rate and Rhythm: Normal rate and regular rhythm.      Heart sounds: Normal heart sounds.   Pulmonary:      Effort: Pulmonary effort is normal. No respiratory distress.      Breath sounds: Normal breath sounds. No wheezing or rales.       Assessment & Plan:   1. Hordeolum externum of right upper eyelid    2. Infection of right eye      1. Hordeolum externum of right upper eyelid  - cefdinir 300 MG Oral Cap; Take 1 capsule (300 mg total) by mouth 2 (two) times daily for 7 days.  Dispense: 14 capsule; Refill: 0  - ofloxacin 0.3 % Ophthalmic Solution; Place 1 drop into the right eye every 4 (four) hours.  Dispense: 1 each; Refill: 0  - Ophthalmology Referral - In Network    2. Infection of right eye  - cefdinir 300 MG Oral Cap; Take 1 capsule (300 mg total) by mouth 2 (two) times daily for 7 days.  Dispense: 14 capsule; Refill: 0  - ofloxacin 0.3 % Ophthalmic Solution; Place 1 drop into the right eye every 4 (four) hours.  Dispense: 1 each; Refill: 0  - Ophthalmology Referral - In Network    Meds This Visit:  Requested Prescriptions     Signed Prescriptions Disp Refills    cefdinir 300 MG Oral Cap 14 capsule 0     Sig: Take 1 capsule (300 mg total) by mouth 2 (two) times daily for 7 days.    ofloxacin 0.3 % Ophthalmic Solution 1 each 0     Sig: Place 1 drop into the right eye every 4 (four) hours.     Imaging & Referrals:  OPHTHALMOLOGY - INTERNAL         [1]   Allergies  Allergen Reactions    Pcn [Penicillins] UNKNOWN    Sulfa Antibiotics UNKNOWN    Sulfa  Drugs Cross Reactors UNKNOWN    Sulfites UNKNOWN

## 2025-01-17 ENCOUNTER — LAB ENCOUNTER (OUTPATIENT)
Dept: LAB | Age: 65
End: 2025-01-17
Attending: FAMILY MEDICINE
Payer: MEDICARE

## 2025-01-17 LAB
ALBUMIN SERPL-MCNC: 4 G/DL (ref 3.2–4.8)
ALBUMIN/GLOB SERPL: 1.2 {RATIO} (ref 1–2)
ALP LIVER SERPL-CCNC: 56 U/L
ALT SERPL-CCNC: <7 U/L
ANION GAP SERPL CALC-SCNC: 10 MMOL/L (ref 0–18)
AST SERPL-CCNC: 12 U/L (ref ?–34)
BILIRUB SERPL-MCNC: 0.6 MG/DL (ref 0.2–1.1)
BUN BLD-MCNC: 9 MG/DL (ref 9–23)
CALCIUM BLD-MCNC: 9.3 MG/DL (ref 8.7–10.6)
CHLORIDE SERPL-SCNC: 105 MMOL/L (ref 98–112)
CHOLEST SERPL-MCNC: 148 MG/DL (ref ?–200)
CO2 SERPL-SCNC: 26 MMOL/L (ref 21–32)
CREAT BLD-MCNC: 0.86 MG/DL
CREAT UR-SCNC: 293.4 MG/DL
EGFRCR SERPLBLD CKD-EPI 2021: 96 ML/MIN/1.73M2 (ref 60–?)
EST. AVERAGE GLUCOSE BLD GHB EST-MCNC: 128 MG/DL (ref 68–126)
FASTING PATIENT LIPID ANSWER: YES
FASTING STATUS PATIENT QL REPORTED: YES
GLOBULIN PLAS-MCNC: 3.3 G/DL (ref 2–3.5)
GLUCOSE BLD-MCNC: 131 MG/DL (ref 70–99)
HBA1C MFR BLD: 6.1 % (ref ?–5.7)
HDLC SERPL-MCNC: 58 MG/DL (ref 40–59)
LDLC SERPL CALC-MCNC: 75 MG/DL (ref ?–100)
MICROALBUMIN UR-MCNC: 3.1 MG/DL
MICROALBUMIN/CREAT 24H UR-RTO: 10.6 UG/MG (ref ?–30)
NONHDLC SERPL-MCNC: 90 MG/DL (ref ?–130)
OSMOLALITY SERPL CALC.SUM OF ELEC: 292 MOSM/KG (ref 275–295)
POTASSIUM SERPL-SCNC: 4.4 MMOL/L (ref 3.5–5.1)
PROT SERPL-MCNC: 7.3 G/DL (ref 5.7–8.2)
SODIUM SERPL-SCNC: 141 MMOL/L (ref 136–145)
TRIGL SERPL-MCNC: 75 MG/DL (ref 30–149)
VLDLC SERPL CALC-MCNC: 12 MG/DL (ref 0–30)

## 2025-01-17 PROCEDURE — 80061 LIPID PANEL: CPT | Performed by: FAMILY MEDICINE

## 2025-01-17 PROCEDURE — 82570 ASSAY OF URINE CREATININE: CPT | Performed by: FAMILY MEDICINE

## 2025-01-17 PROCEDURE — 83036 HEMOGLOBIN GLYCOSYLATED A1C: CPT | Performed by: FAMILY MEDICINE

## 2025-01-17 PROCEDURE — 36415 COLL VENOUS BLD VENIPUNCTURE: CPT | Performed by: FAMILY MEDICINE

## 2025-01-17 PROCEDURE — 82043 UR ALBUMIN QUANTITATIVE: CPT | Performed by: FAMILY MEDICINE

## 2025-01-17 PROCEDURE — 80053 COMPREHEN METABOLIC PANEL: CPT | Performed by: FAMILY MEDICINE

## 2025-01-21 ENCOUNTER — OFFICE VISIT (OUTPATIENT)
Dept: FAMILY MEDICINE CLINIC | Facility: CLINIC | Age: 65
End: 2025-01-21
Payer: MEDICARE

## 2025-01-21 VITALS
HEART RATE: 70 BPM | WEIGHT: 205 LBS | RESPIRATION RATE: 16 BRPM | OXYGEN SATURATION: 99 % | BODY MASS INDEX: 40.25 KG/M2 | DIASTOLIC BLOOD PRESSURE: 60 MMHG | SYSTOLIC BLOOD PRESSURE: 118 MMHG | HEIGHT: 60 IN

## 2025-01-21 DIAGNOSIS — E11.42 DM TYPE 2 WITH DIABETIC PERIPHERAL NEUROPATHY (HCC): Primary | ICD-10-CM

## 2025-01-21 DIAGNOSIS — Z86.73 HISTORY OF CVA (CEREBROVASCULAR ACCIDENT): ICD-10-CM

## 2025-01-21 DIAGNOSIS — I10 ESSENTIAL HYPERTENSION: ICD-10-CM

## 2025-01-21 DIAGNOSIS — R93.1 ELEVATED CORONARY ARTERY CALCIUM SCORE: ICD-10-CM

## 2025-01-21 DIAGNOSIS — I69.354 HEMIPARESIS AFFECTING LEFT SIDE AS LATE EFFECT OF STROKE (HCC): ICD-10-CM

## 2025-01-21 PROCEDURE — G0009 ADMIN PNEUMOCOCCAL VACCINE: HCPCS | Performed by: FAMILY MEDICINE

## 2025-01-21 PROCEDURE — 90677 PCV20 VACCINE IM: CPT | Performed by: FAMILY MEDICINE

## 2025-01-21 PROCEDURE — 99214 OFFICE O/P EST MOD 30 MIN: CPT | Performed by: FAMILY MEDICINE

## 2025-01-21 NOTE — PROGRESS NOTES
HPI:   Malachi Gonzalez is a 64 year old male who presents for recheck of his diabetes       No sleep study thus far     Patient’s -160's   Eating later in the evening   On ozempic 1mg / declines increase   Good weight loss   No SE   Walking some     Malachi has been checking his feet on a regular basis.   Malachi denies any tingling of the feet.   Pt complains of poor balance; pt uses a cane since his CVA.    Last eye exam:  Dr. Ritchie h/o retinopathy and Dr gomez     Seeing neuro 2x per year  Last saw cards 2 years ago     Patient denies chest pain, shortness of breath, dizziness, and lightheadedness. No exertional symptoms.    Discussed labs     Component      Latest Ref Rng 3/19/2024 7/12/2024 10/11/2024 1/17/2025   Glucose      70 - 99 mg/dL 153 (H)  114 (H)  102 (H)  131 (H)    Sodium      136 - 145 mmol/L 140  143  139  141    Potassium      3.5 - 5.1 mmol/L 3.9  4.2  4.2  4.4    Chloride      98 - 112 mmol/L 109  110  107  105    Carbon Dioxide, Total      21.0 - 32.0 mmol/L 26.0  27.0  27.0  26.0    ANION GAP      0 - 18 mmol/L 5  6  5  10    BUN      9 - 23 mg/dL 7 (L)  9  9  9    CREATININE      0.70 - 1.30 mg/dL 0.72  0.67 (L)  0.70  0.86    CALCIUM      8.7 - 10.6 mg/dL 8.9  8.5  8.8  9.3    CALCULATED OSMOLALITY      275 - 295 mOsm/kg 291  296 (H)  287  292    EGFR      >=60 mL/min/1.73m2 102  104  103  96    AST (SGOT)      <34 U/L 7 (L)  8 (L)  10  12    ALT (SGPT)      10 - 49 U/L 7 (L)  11 (L)  <7 (L)  <7 (L)    ALKALINE PHOSPHATASE      45 - 117 U/L 70  58  65  56    Total Bilirubin      0.2 - 1.1 mg/dL 0.4  0.5  0.7  0.6    PROTEIN, TOTAL      5.7 - 8.2 g/dL 7.2  6.8  7.0  7.3    Albumin      3.2 - 4.8 g/dL 3.5  3.6  4.5  4.0    Globulin      2.0 - 3.5 g/dL 3.7  3.2  2.5  3.3    A/G Ratio      1.0 - 2.0  0.9 (L)  1.1  1.8  1.2    Patient Fasting for CMP? Yes  Yes  No  Yes    Cholesterol, Total      <200 mg/dL 135  131  143  148    HDL Cholesterol      40 - 59 mg/dL 60 (H)  57  59   58    Triglycerides      30 - 149 mg/dL 59  54  51  75    LDL Cholesterol Calc      <100 mg/dL 62  62  73  75    VLDL      0 - 30 mg/dL 9  8  8  12    NON-HDL CHOLESTEROL      <130 mg/dL 75  74  84  90    Patient Fasting for Lipid? Yes  Yes  No  Yes    MALB URINE      mg/dL 2.10    3.10    CREATININE UR RANDOM      mg/dL 116.00    293.40    MALB/CRE CALC      <=30.0 ug/mg 18.1    10.6    HEMOGLOBIN A1c      <5.7 % 6.4 (H)  5.9 (H)  6.0 (H)  6.1 (H)    ESTIMATED AVERAGE GLUCOSE      68 - 126 mg/dL 137 (H)  123  126  128 (H)       Legend:  (H) High  (L) Low        Current Outpatient Medications   Medication Sig Dispense Refill    ofloxacin 0.3 % Ophthalmic Solution Place 1 drop into the right eye every 4 (four) hours. 1 each 0    CONTOUR NEXT TEST In Vitro Strip USE WITH METER TO CHECK BLOOD  SUGAR TWICE DAILY 200 strip 3    OZEMPIC, 1 MG/DOSE, 4 MG/3ML Subcutaneous Solution Pen-injector INJECT SUBCUTANEOUSLY 1 MG EVERY WEEK 9 mL 3    METFORMIN 500 MG Oral Tab TAKE 1 TABLET BY MOUTH TWICE  DAILY WITH MEALS 180 tablet 3    amLODIPine 10 MG Oral Tab Take 1 tablet (10 mg total) by mouth daily. 90 tablet 3    lisinopril 40 MG Oral Tab Take 1 tablet (40 mg total) by mouth daily. 90 tablet 3    simvastatin 40 MG Oral Tab Take 1 tablet (40 mg total) by mouth nightly. 90 tablet 3    Butalbital-Acetaminophen (BUTALBITAL-APAP)  MG Oral Tab Take 1 tablet by mouth every 4 (four) hours as needed (as needed).      Cholecalciferol (VITAMIN D) 50 MCG (2000 UT) Oral Cap Take by mouth.      cyanocobalamin 1000 MCG Oral Tab Take 1 tablet (1,000 mcg total) by mouth daily.      HYDROcodone-acetaminophen 5-300 MG Oral Tab daily as needed.      Sildenafil Citrate 100 MG Oral Tab Take 1 tablet (100 mg total) by mouth daily as needed for Erectile Dysfunction. 16 tablet 1    PREVIDENT 5000 DRY MOUTH 1.1 % Dental Gel USE IT AS A REGULAR TOOTHPASTE TID FOR 2 MINUTES.  3    Acetaminophen-Codeine #3 300-30 MG Oral Tab Take 1-2 tablets by  mouth every 6 (six) hours as needed for Pain. 30 tablet 0    Chlorhexidine Gluconate 0.12 % Mouth/Throat Solution as needed.        Aspirin 325 MG Oral Tab Take 1 tablet (325 mg total) by mouth daily.        Past Medical History:    Acute, but ill-defined, cerebrovascular disease    Arthropathy    Thoracic    Atypical mole    B12 deficiency    Back pain    Backache, unspecified    Background diabetic retinopathy(362.01)    Black stools    Bleeding nose    Capsulitis    Cataract    Cerebral embolism with cerebral infarction ([facility])    Corns and callosities    CVA (cerebral vascular accident) ([facility])    Depression    Depressive disorder, not elsewhere classified    Diabetes (HCC)    Diabetes mellitus (HCC)    Diplopia    Essential hypertension    Fatigue    Feeling lonely    Flatulence/gas pain/belching    Frequent urination    Headache    Headache(784.0)    Hyperlipidemia    Hypertension    Leg swelling    Loss of appetite    Muscle weakness (generalized)    Myofacial muscle pain    Night sweats    Obesity, unspecified    Pain in joint, shoulder region    Pain in joints    Proliferative diabetic retinopathy(362.02)    Radiculitis, cervical    Sexual dysfunction    Shortness of breath    Sleep disturbance    Stool incontinence    Stroke ([facility])    Syncope and collapse    Tendonitis    Tension headache    Thalamic infarction ([facility])    bilateral thalamic infarction    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, not stated as uncontrolled(250.50)    Uncomfortable fullness after meals    Unspecified cerebral artery occlusion with cerebral infarction    Vitreous hemorrhage ([facility])    Wears glasses    Weight loss      Past Surgical History:   Procedure Laterality Date    Colonoscopy  April 2018    Laser surgery of eye  9/2010    Other surgical history      URETHRAL DILATION    Shoulder surg proc unlisted  4/1/2010    Left subdeltoid steroid injection, Left shoulder bursitis      Social History:    Social History     Socioeconomic History    Marital status:    Tobacco Use    Smoking status: Never    Smokeless tobacco: Never   Vaping Use    Vaping status: Never Used   Substance and Sexual Activity    Alcohol use: Yes     Alcohol/week: 0.0 standard drinks of alcohol     Comment: 1 or 2 glasses of beer or wine per month, social    Drug use: No   Other Topics Concern    Caffeine Concern Yes     Comment: 2-3 cups coffee/day, large    Exercise Yes     Comment: walking   pt reports he lives with his wife  On disability after CVA  2 children   Exercise: three times per week.  Diet: watches sugar closely     REVIEW OF SYSTEMS:   GENERAL: feels well otherwise  SKIN: denies any unusual skin lesions or rashes  PULMONARY: denies cough or shortness of breath  CV: denies chest pain or palpitations  GI: denies abdominal pain, heartburn, chronic diarrhea or constipation  NEURO: denies headaches or dizziness  PSYCH: denies depression or anxiety  NUTRITION:follows diabetic diet    EXAM:   /60   Pulse 70   Resp 16   Ht 5' (1.524 m)   Wt 205 lb (93 kg)   SpO2 99%   BMI 40.04 kg/m²    Body mass index is 40.04 kg/m².  GENERAL: well developed, well nourished, in no apparent distress  SKIN: no rashes,no suspicious lesions  NECK: supple,no adenopathy, no thyromegaly  LUNGS: clear to auscultation, easy breathing, no cough  CV: normal S1 S2, RRR without murmur  GI: good BS's, no masses, no HSM or tenderness, no CVA tenderness   EXT: no cyanosis, clubbing or edema, bilateral foot exam unremarkable for open skin or lesions, bilateral 2+ DP, normal visual inspection bilaterally  NEURO: sensation is intact to monofilament bilaterally   Left sided weakness   PSYCH: judgement and insight are appropriate & intact      ASSESSMENT AND PLAN:   Malachi Gonzalez is a 64 year old male who presents for a recheck of his diabetes.   Discussed importance of medication, diet adherence, skin care and routine exercise.   Reviewed  good diabetic foot care.   Annual dilated eye exams reinforced. Routine accuchecks and diabetic labwork as discussed.    1. DM type 2 with diabetic peripheral neuropathy (HCC)    - OP [facility] Referral - Internal  - Comp Metabolic Panel (14)  - Hemoglobin A1C  - Lipid Panel    2. Essential hypertension    - OP [facility] Referral - Internal    3. History of CVA (cerebrovascular accident)    - OP [facility] Referral - Internal    4. Elevated coronary artery calcium score    - OP [facility] Referral - Internal    5. Hemiparesis affecting left side as late effect of stroke (HCC)    - OP [facility] Referral - Internal        DISCUSSED SLEEP STUDY     The patient indicates understanding of these issues and agrees to the plan.  Follow up 3 month or sooner if needed

## 2025-02-19 DIAGNOSIS — E11.42 DM TYPE 2 WITH DIABETIC PERIPHERAL NEUROPATHY (HCC): ICD-10-CM

## 2025-02-19 DIAGNOSIS — I69.354 HEMIPARESIS AFFECTING LEFT SIDE AS LATE EFFECT OF STROKE (HCC): ICD-10-CM

## 2025-02-19 RX ORDER — SIMVASTATIN 40 MG
40 TABLET ORAL NIGHTLY
Qty: 90 TABLET | Refills: 3 | Status: SHIPPED | OUTPATIENT
Start: 2025-02-19

## 2025-03-07 DIAGNOSIS — I10 ESSENTIAL HYPERTENSION: ICD-10-CM

## 2025-03-07 RX ORDER — LISINOPRIL 40 MG/1
40 TABLET ORAL DAILY
Qty: 90 TABLET | Refills: 3 | Status: SHIPPED | OUTPATIENT
Start: 2025-03-07

## 2025-03-07 RX ORDER — AMLODIPINE BESYLATE 10 MG/1
10 TABLET ORAL DAILY
Qty: 90 TABLET | Refills: 3 | Status: SHIPPED | OUTPATIENT
Start: 2025-03-07

## 2025-04-21 ENCOUNTER — LAB ENCOUNTER (OUTPATIENT)
Dept: LAB | Age: 65
End: 2025-04-21
Attending: FAMILY MEDICINE
Payer: MEDICARE

## 2025-04-21 LAB
ALBUMIN SERPL-MCNC: 4.5 G/DL (ref 3.2–4.8)
ALBUMIN/GLOB SERPL: 1.7 {RATIO} (ref 1–2)
ALP LIVER SERPL-CCNC: 63 U/L (ref 45–117)
ALT SERPL-CCNC: <7 U/L (ref 10–49)
ANION GAP SERPL CALC-SCNC: 9 MMOL/L (ref 0–18)
AST SERPL-CCNC: 10 U/L (ref ?–34)
BILIRUB SERPL-MCNC: 0.5 MG/DL (ref 0.2–1.1)
BUN BLD-MCNC: 12 MG/DL (ref 9–23)
CALCIUM BLD-MCNC: 9.5 MG/DL (ref 8.7–10.6)
CHLORIDE SERPL-SCNC: 107 MMOL/L (ref 98–112)
CHOLEST SERPL-MCNC: 134 MG/DL (ref ?–200)
CO2 SERPL-SCNC: 27 MMOL/L (ref 21–32)
CREAT BLD-MCNC: 0.85 MG/DL (ref 0.7–1.3)
EGFRCR SERPLBLD CKD-EPI 2021: 96 ML/MIN/1.73M2 (ref 60–?)
EST. AVERAGE GLUCOSE BLD GHB EST-MCNC: 126 MG/DL (ref 68–126)
FASTING PATIENT LIPID ANSWER: YES
FASTING STATUS PATIENT QL REPORTED: YES
GLOBULIN PLAS-MCNC: 2.6 G/DL (ref 2–3.5)
GLUCOSE BLD-MCNC: 119 MG/DL (ref 70–99)
HBA1C MFR BLD: 6 % (ref ?–5.7)
HDLC SERPL-MCNC: 55 MG/DL (ref 40–59)
LDLC SERPL CALC-MCNC: 62 MG/DL (ref ?–100)
NONHDLC SERPL-MCNC: 79 MG/DL (ref ?–130)
OSMOLALITY SERPL CALC.SUM OF ELEC: 297 MOSM/KG (ref 275–295)
POTASSIUM SERPL-SCNC: 4.3 MMOL/L (ref 3.5–5.1)
PROT SERPL-MCNC: 7.1 G/DL (ref 5.7–8.2)
SODIUM SERPL-SCNC: 143 MMOL/L (ref 136–145)
TRIGL SERPL-MCNC: 87 MG/DL (ref 30–149)
VLDLC SERPL CALC-MCNC: 13 MG/DL (ref 0–30)

## 2025-04-21 PROCEDURE — 83036 HEMOGLOBIN GLYCOSYLATED A1C: CPT | Performed by: FAMILY MEDICINE

## 2025-04-21 PROCEDURE — 80053 COMPREHEN METABOLIC PANEL: CPT | Performed by: FAMILY MEDICINE

## 2025-04-21 PROCEDURE — 80061 LIPID PANEL: CPT | Performed by: FAMILY MEDICINE

## 2025-04-22 ENCOUNTER — OFFICE VISIT (OUTPATIENT)
Dept: FAMILY MEDICINE CLINIC | Facility: CLINIC | Age: 65
End: 2025-04-22
Payer: MEDICARE

## 2025-04-22 VITALS
HEART RATE: 67 BPM | WEIGHT: 200 LBS | HEIGHT: 60 IN | BODY MASS INDEX: 39.27 KG/M2 | OXYGEN SATURATION: 94 % | DIASTOLIC BLOOD PRESSURE: 68 MMHG | SYSTOLIC BLOOD PRESSURE: 128 MMHG | RESPIRATION RATE: 16 BRPM

## 2025-04-22 DIAGNOSIS — R93.1 ELEVATED CORONARY ARTERY CALCIUM SCORE: ICD-10-CM

## 2025-04-22 DIAGNOSIS — E11.42 DM TYPE 2 WITH DIABETIC PERIPHERAL NEUROPATHY (HCC): Primary | ICD-10-CM

## 2025-04-22 DIAGNOSIS — R21 RASH: ICD-10-CM

## 2025-04-22 DIAGNOSIS — I10 ESSENTIAL HYPERTENSION: ICD-10-CM

## 2025-04-22 DIAGNOSIS — R06.83 SNORING: ICD-10-CM

## 2025-04-22 DIAGNOSIS — Z86.73 HISTORY OF CVA (CEREBROVASCULAR ACCIDENT): ICD-10-CM

## 2025-04-22 PROCEDURE — 99214 OFFICE O/P EST MOD 30 MIN: CPT | Performed by: FAMILY MEDICINE

## 2025-04-22 RX ORDER — FLUOCINONIDE 0.5 MG/G
CREAM TOPICAL
Qty: 30 G | Refills: 0 | Status: SHIPPED | OUTPATIENT
Start: 2025-04-22

## 2025-04-22 NOTE — PROGRESS NOTES
.     The following individual(s) verbally consented to be recorded using ambient AI listening technology and understand that they can each withdraw their consent to this listening technology at any point by asking the clinician to turn off or pause the recording:    Patient name: Malachi Gonzalez  Additional names:        HPI:   Malachi Gonzalez is a 64 year old male who presents for recheck of his diabetes     History of Present Illness  Mr. Malachi Gonzalez is a 64-year-old male with a history of stroke who presents for a follow-up and review of lab results.    He sees his neurologist annually, with the last visit in October. He denies needing any medication refills and is compliant with his medications.    He checks his blood sugar once in the morning, with readings typically ranging from 110 to 120 mg/dL. Over the past week, his blood sugar levels have increased, with readings of 145 mg/dL today, 149 mg/dL yesterday, and 160 mg/dL on Easter Sunday. He attributes this to being a 'bachelor for a week' while his wife was out of town. No episodes of hypoglycemia with sugars under 70 mg/dL.    He has a dry skin patch on his arm, which itches slightly. He believes it may be due to the dry conditions in his house during winter. He uses a walking stick in his right hand, which may contribute to the chafing motion causing the dryness.    No headaches, dizziness, chest pain, or shortness of breath. He maintains a balanced diet and exercises regularly. He has not completed a sleep study yet, as he tends to forget about it until just before his appointments.    His current medications include amlodipine 10 mg, lisinopril 40 mg, metformin 500 mg twice a day, Ozempic 1 mg, and simvastatin 40 mg.      No sleep study thus far     Patient’s -160's   Eating later in the evening   On ozempic 1mg / declines increase   Good weight loss   No SE   Walking some     Malachi has been checking his feet on a  regular basis.   Malachi denies any tingling of the feet.   Pt complains of poor balance; pt uses a cane since his CVA.    Last eye exam:  Dr. Ritchie h/o retinopathy and Dr gomez     Seeing neuro 2x per year  Last saw cards 2 years ago     Patient denies chest pain, shortness of breath, dizziness, and lightheadedness. No exertional symptoms.    Discussed labs     Component      Latest Ref Rng 10/11/2024 1/17/2025 4/21/2025   Glucose      70 - 99 mg/dL 102 (H)  131 (H)  119 (H)    Sodium      136 - 145 mmol/L 139  141  143    Potassium      3.5 - 5.1 mmol/L 4.2  4.4  4.3    Chloride      98 - 112 mmol/L 107  105  107    Carbon Dioxide, Total      21.0 - 32.0 mmol/L 27.0  26.0  27.0    ANION GAP      0 - 18 mmol/L 5  10  9    BUN      9 - 23 mg/dL 9  9  12    CREATININE      0.70 - 1.30 mg/dL 0.70  0.86  0.85    CALCIUM      8.7 - 10.6 mg/dL 8.8  9.3  9.5    CALCULATED OSMOLALITY      275 - 295 mOsm/kg 287  292  297 (H)    EGFR      >=60 mL/min/1.73m2 103  96  96    AST (SGOT)      <34 U/L 10  12  10    ALT (SGPT)      10 - 49 U/L <7 (L)  <7 (L)  <7 (L)    ALKALINE PHOSPHATASE      45 - 117 U/L 65  56  63    Total Bilirubin      0.2 - 1.1 mg/dL 0.7  0.6  0.5    PROTEIN, TOTAL      5.7 - 8.2 g/dL 7.0  7.3  7.1    Albumin      3.2 - 4.8 g/dL 4.5  4.0  4.5    Globulin      2.0 - 3.5 g/dL 2.5  3.3  2.6    A/G Ratio      1.0 - 2.0  1.8  1.2  1.7    Patient Fasting for CMP? No  Yes  Yes    Cholesterol, Total      <200 mg/dL 143  148  134    HDL Cholesterol      40 - 59 mg/dL 59  58  55    Triglycerides      30 - 149 mg/dL 51  75  87    LDL Cholesterol Calc      <100 mg/dL 73  75  62    VLDL      0 - 30 mg/dL 8  12  13    NON-HDL CHOLESTEROL      <130 mg/dL 84  90  79    Patient Fasting for Lipid? No  Yes  Yes    MALB URINE      mg/dL  3.10     CREATININE UR RANDOM      mg/dL  293.40     MALB/CRE CALC      <=30.0 ug/mg  10.6     HEMOGLOBIN A1c      <5.7 % 6.0 (H)  6.1 (H)  6.0 (H)    ESTIMATED AVERAGE GLUCOSE      68 - 126  mg/dL 126  128 (H)  126       Legend:  (H) High  (L) Low    Current Outpatient Medications   Medication Sig Dispense Refill    AMLODIPINE 10 MG Oral Tab TAKE 1 TABLET BY MOUTH DAILY 90 tablet 3    LISINOPRIL 40 MG Oral Tab TAKE 1 TABLET BY MOUTH DAILY 90 tablet 3    SIMVASTATIN 40 MG Oral Tab TAKE 1 TABLET BY MOUTH EVERY  NIGHT 90 tablet 3    ofloxacin 0.3 % Ophthalmic Solution Place 1 drop into the right eye every 4 (four) hours. 1 each 0    CONTOUR NEXT TEST In Vitro Strip USE WITH METER TO CHECK BLOOD  SUGAR TWICE DAILY 200 strip 3    OZEMPIC, 1 MG/DOSE, 4 MG/3ML Subcutaneous Solution Pen-injector INJECT SUBCUTANEOUSLY 1 MG EVERY WEEK 9 mL 3    METFORMIN 500 MG Oral Tab TAKE 1 TABLET BY MOUTH TWICE  DAILY WITH MEALS 180 tablet 3    Butalbital-Acetaminophen (BUTALBITAL-APAP)  MG Oral Tab Take 1 tablet by mouth every 4 (four) hours as needed (as needed).      Cholecalciferol (VITAMIN D) 50 MCG (2000 UT) Oral Cap Take by mouth.      cyanocobalamin 1000 MCG Oral Tab Take 1 tablet (1,000 mcg total) by mouth daily.      HYDROcodone-acetaminophen 5-300 MG Oral Tab daily as needed.      Sildenafil Citrate 100 MG Oral Tab Take 1 tablet (100 mg total) by mouth daily as needed for Erectile Dysfunction. 16 tablet 1    PREVIDENT 5000 DRY MOUTH 1.1 % Dental Gel USE IT AS A REGULAR TOOTHPASTE TID FOR 2 MINUTES.  3    Acetaminophen-Codeine #3 300-30 MG Oral Tab Take 1-2 tablets by mouth every 6 (six) hours as needed for Pain. 30 tablet 0    Chlorhexidine Gluconate 0.12 % Mouth/Throat Solution as needed.        Aspirin 325 MG Oral Tab Take 1 tablet (325 mg total) by mouth daily.        Past Medical History:    Acute, but ill-defined, cerebrovascular disease    Arthropathy    Thoracic    Atypical mole    B12 deficiency    Back pain    Backache, unspecified    Background diabetic retinopathy(362.01)    Black stools    Bleeding nose    Capsulitis    Cataract    Cerebral embolism with cerebral infarction (HCC)    Corns and  callosities    CVA (cerebral vascular accident) ([facility])    Depression    Depressive disorder, not elsewhere classified    Diabetes (HCC)    Diabetes mellitus (HCC)    Diplopia    Essential hypertension    Fatigue    Feeling lonely    Flatulence/gas pain/belching    Frequent urination    Headache    Headache(784.0)    Hyperlipidemia    Hypertension    Leg swelling    Loss of appetite    Muscle weakness (generalized)    Myofacial muscle pain    Night sweats    Obesity, unspecified    Pain in joint, shoulder region    Pain in joints    Proliferative diabetic retinopathy(362.02)    Radiculitis, cervical    Sexual dysfunction    Shortness of breath    Sleep disturbance    Stool incontinence    Stroke ([facility])    Syncope and collapse    Tendonitis    Tension headache    Thalamic infarction ([facility])    bilateral thalamic infarction    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, not stated as uncontrolled(250.50)    Uncomfortable fullness after meals    Unspecified cerebral artery occlusion with cerebral infarction    Vitreous hemorrhage ([facility])    Wears glasses    Weight loss      Past Surgical History:   Procedure Laterality Date    Colonoscopy  April 2018    Laser surgery of eye  9/2010    Other surgical history      URETHRAL DILATION    Shoulder surg proc unlisted  4/1/2010    Left subdeltoid steroid injection, Left shoulder bursitis      Social History:   Social History     Socioeconomic History    Marital status:    Tobacco Use    Smoking status: Never    Smokeless tobacco: Never   Vaping Use    Vaping status: Never Used   Substance and Sexual Activity    Alcohol use: Yes     Alcohol/week: 0.0 standard drinks of alcohol     Comment: 1 or 2 glasses of beer or wine per month, social    Drug use: No   Other Topics Concern    Caffeine Concern Yes     Comment: 2-3 cups coffee/day, large    Exercise Yes     Comment: walking   pt reports he lives with his wife  On disability after CVA  2  children   Exercise: three times per week.  Diet: watches sugar closely     REVIEW OF SYSTEMS:   GENERAL: feels well otherwise  SKIN: denies any unusual skin lesions or rashes  PULMONARY: denies cough or shortness of breath  CV: denies chest pain or palpitations  GI: denies abdominal pain, heartburn, chronic diarrhea or constipation  NEURO: denies headaches or dizziness  PSYCH: denies depression or anxiety  NUTRITION:follows diabetic diet    EXAM:   /68   Pulse 67   Resp 16   Ht 5' (1.524 m)   Wt 200 lb (90.7 kg)   SpO2 94%   BMI 39.06 kg/m²    Body mass index is 39.06 kg/m².  GENERAL: well developed, well nourished, in no apparent distress  SKIN: no rashes,no suspicious lesions  NECK: supple,no adenopathy, no thyromegaly  LUNGS: clear to auscultation, easy breathing, no cough  CV: normal S1 S2, RRR without murmur  GI: good BS's, no masses, no HSM or tenderness, no CVA tenderness   EXT: no cyanosis, clubbing or edema, bilateral foot exam unremarkable for open skin or lesions, bilateral 2+ DP, normal visual inspection bilaterally  NEURO: sensation is intact to monofilament bilaterally   Left sided weakness   PSYCH: judgement and insight are appropriate & intact      ASSESSMENT AND PLAN:   Malachi Gonzalez is a 64 year old male who presents for a recheck of his diabetes.   Discussed importance of medication, diet adherence, skin care and routine exercise.   Reviewed good diabetic foot care.   Annual dilated eye exams reinforced. Routine accuchecks and diabetic labwork as discussed.  Assessment & Plan  CVA with left-sided hemiparesis  No new neurological symptoms. Regular neurologist follow-ups maintained. Recent dietary changes led to elevated blood sugar levels, but A1c remains stable.  - Continue current medication regimen  - Schedule neurologist follow-up as needed  - Monitor blood sugar levels and resume regular diet    Essential hypertension  Blood pressure management ongoing with current  medications. No chest pain or dyspnea. Medication compliance noted.  - Continue Amlodipine 10 mg orally daily  - Continue Lisinopril 40 mg orally daily    Dry skin with mild pruritus  Mild dry skin with pruritus on the arm. No signs of herpes zoster. Likely due to chafing or dry environment.  - Prescribe steroid cream for up to one week  - Apply Vaseline to affected area after steroid cream    General Health Maintenance  Compliance with medication regimen and regular exercise. Discussed importance of scheduling a sleep study, which remains incomplete.  - Schedule sleep study  - Continue regular exercise and consider discounted membership at [employer] if needed          1. Rash    - fluocinonide 0.05 % External Cream; Apply bid For 7 days  Dispense: 30 g; Refill: 0    2. DM type 2 with diabetic peripheral neuropathy (HCC)    - Comp Metabolic Panel (14)  - Lipid Panel  - Hemoglobin A1C    3. Essential hypertension  cpm    4. History of CVA (cerebrovascular accident)      5. Elevated coronary artery calcium score      6. Snoring    - OP REFERRAL TO DIAGNOSTIC SLEEP STUDY    The patient indicates understanding of these issues and agrees to the plan.  Follow up 3 months or sooner if needed

## 2025-05-19 ENCOUNTER — TELEPHONE (OUTPATIENT)
Dept: FAMILY MEDICINE CLINIC | Facility: CLINIC | Age: 65
End: 2025-05-19

## 2025-05-19 ENCOUNTER — PATIENT MESSAGE (OUTPATIENT)
Dept: FAMILY MEDICINE CLINIC | Facility: CLINIC | Age: 65
End: 2025-05-19

## 2025-05-19 DIAGNOSIS — R37 SEXUAL DYSFUNCTION: ICD-10-CM

## 2025-05-19 NOTE — TELEPHONE ENCOUNTER
[facility] calling to request a prior authorization for:      OZEMPIC, 1 MG/DOSE, 4 MG/3ML Subcutaneous Solution Pen-injector     Please advise.

## 2025-05-20 NOTE — TELEPHONE ENCOUNTER
Called [facility] back    Last time this was rx'd was sept, why the PA now?    Advised me there is form online we can print and complete vs them faxing     Website [email or web address]    I located    Rep confirms pt member ID#[id number]    Form filled out with recent ofc notes

## 2025-05-21 RX ORDER — SILDENAFIL 100 MG/1
100 TABLET, FILM COATED ORAL
Qty: 16 TABLET | Refills: 0 | Status: SHIPPED | OUTPATIENT
Start: 2025-05-21

## 2025-07-01 DIAGNOSIS — E11.42 DM TYPE 2 WITH DIABETIC PERIPHERAL NEUROPATHY (HCC): ICD-10-CM

## 2025-07-02 NOTE — TELEPHONE ENCOUNTER
A refill request was received for:  Requested Prescriptions     Pending Prescriptions Disp Refills    METFORMIN 500 MG Oral Tab [Pharmacy Med Name: metFORMIN HCl 500 MG Oral Tablet] 180 tablet 3     Sig: TAKE 1 TABLET BY MOUTH TWICE  DAILY WITH MEALS       Last refill date:   6/18/24    Last office visit: 4/22/25    Follow up due: 3 month in July  Future Appointments   Date Time Provider Department Center   7/24/2025  9:00 AM Ale Daily DO [facility] [address]   10/23/2025 10:00 AM William Santiago MD [facility] [place]   10/30/2025  9:40 AM William Santiago MD [facility] [place]

## 2025-07-22 ENCOUNTER — LAB ENCOUNTER (OUTPATIENT)
Dept: LAB | Age: 65
End: 2025-07-22
Attending: FAMILY MEDICINE
Payer: COMMERCIAL

## 2025-07-22 LAB
ALBUMIN SERPL-MCNC: 4.1 G/DL (ref 3.2–4.8)
ALBUMIN/GLOB SERPL: 1.6 {RATIO} (ref 1–2)
ALP LIVER SERPL-CCNC: 56 U/L (ref 45–117)
ALT SERPL-CCNC: <7 U/L (ref 10–49)
ANION GAP SERPL CALC-SCNC: 10 MMOL/L (ref 0–18)
AST SERPL-CCNC: 9 U/L (ref ?–34)
BILIRUB SERPL-MCNC: 0.6 MG/DL (ref 0.2–1.1)
BUN BLD-MCNC: 6 MG/DL (ref 9–23)
CALCIUM BLD-MCNC: 9 MG/DL (ref 8.7–10.6)
CHLORIDE SERPL-SCNC: 107 MMOL/L (ref 98–112)
CHOLEST SERPL-MCNC: 135 MG/DL (ref ?–200)
CO2 SERPL-SCNC: 26 MMOL/L (ref 21–32)
CREAT BLD-MCNC: 0.89 MG/DL (ref 0.7–1.3)
EGFRCR SERPLBLD CKD-EPI 2021: 95 ML/MIN/1.73M2 (ref 60–?)
EST. AVERAGE GLUCOSE BLD GHB EST-MCNC: 120 MG/DL (ref 68–126)
FASTING PATIENT LIPID ANSWER: YES
FASTING STATUS PATIENT QL REPORTED: YES
GLOBULIN PLAS-MCNC: 2.6 G/DL (ref 2–3.5)
GLUCOSE BLD-MCNC: 106 MG/DL (ref 70–99)
HBA1C MFR BLD: 5.8 % (ref ?–5.7)
HDLC SERPL-MCNC: 54 MG/DL (ref 40–59)
LDLC SERPL CALC-MCNC: 67 MG/DL (ref ?–100)
NONHDLC SERPL-MCNC: 81 MG/DL (ref ?–130)
OSMOLALITY SERPL CALC.SUM OF ELEC: 294 MOSM/KG (ref 275–295)
POTASSIUM SERPL-SCNC: 3.8 MMOL/L (ref 3.5–5.1)
PROT SERPL-MCNC: 6.7 G/DL (ref 5.7–8.2)
SODIUM SERPL-SCNC: 143 MMOL/L (ref 136–145)
TRIGL SERPL-MCNC: 67 MG/DL (ref 30–149)
VLDLC SERPL CALC-MCNC: 10 MG/DL (ref 0–30)

## 2025-07-22 PROCEDURE — 80061 LIPID PANEL: CPT | Performed by: FAMILY MEDICINE

## 2025-07-22 PROCEDURE — 83036 HEMOGLOBIN GLYCOSYLATED A1C: CPT | Performed by: FAMILY MEDICINE

## 2025-07-22 PROCEDURE — 80053 COMPREHEN METABOLIC PANEL: CPT | Performed by: FAMILY MEDICINE

## 2025-07-24 ENCOUNTER — OFFICE VISIT (OUTPATIENT)
Dept: FAMILY MEDICINE CLINIC | Facility: CLINIC | Age: 65
End: 2025-07-24
Payer: COMMERCIAL

## 2025-07-24 DIAGNOSIS — I69.354 HEMIPARESIS AFFECTING LEFT SIDE AS LATE EFFECT OF STROKE (HCC): ICD-10-CM

## 2025-07-24 DIAGNOSIS — Z86.73 HISTORY OF CVA (CEREBROVASCULAR ACCIDENT): ICD-10-CM

## 2025-07-24 DIAGNOSIS — R93.1 ELEVATED CORONARY ARTERY CALCIUM SCORE: ICD-10-CM

## 2025-07-24 DIAGNOSIS — R06.83 SNORING: ICD-10-CM

## 2025-07-24 DIAGNOSIS — Z00.00 GENERAL MEDICAL EXAM: ICD-10-CM

## 2025-07-24 DIAGNOSIS — I10 ESSENTIAL HYPERTENSION: ICD-10-CM

## 2025-07-24 DIAGNOSIS — E11.42 DM TYPE 2 WITH DIABETIC PERIPHERAL NEUROPATHY (HCC): Primary | ICD-10-CM

## 2025-07-24 NOTE — PROGRESS NOTES
HPI:   Malachi Gonzalez is a 64 year old male who presents for recheck of his diabetes       No sleep study thus far     Patient’s 's  Eating later in the evening   On ozempic 1mg / declines increase   Good weight loss   No SE   Active at home   Diet good     Malachi has been checking his feet on a regular basis.   Malachi denies any tingling of the feet.   Pt complains of poor balance; pt uses a cane since his CVA.    Last eye exam:  Dr. Ritchie h/o retinopathy and Dr gomez     Seeing neuro 2x per year  Last saw cards 3 years ago     Patient denies chest pain, shortness of breath, dizziness, and lightheadedness. No exertional symptoms.    Discussed labs     Component      Latest Ref Rng 7/12/2024 10/11/2024 1/17/2025 4/21/2025 7/22/2025   Glucose      70 - 99 mg/dL 114 (H)  102 (H)  131 (H)  119 (H)  106 (H)    Sodium      136 - 145 mmol/L 143  139  141  143  143    Potassium      3.5 - 5.1 mmol/L 4.2  4.2  4.4  4.3  3.8    Chloride      98 - 112 mmol/L 110  107  105  107  107    Carbon Dioxide, Total      21.0 - 32.0 mmol/L 27.0  27.0  26.0  27.0  26.0    ANION GAP      0 - 18 mmol/L 6  5  10  9  10    BUN      9 - 23 mg/dL 9  9  9  12  6 (L)    CREATININE      0.70 - 1.30 mg/dL 0.67 (L)  0.70  0.86  0.85  0.89    CALCIUM      8.7 - 10.6 mg/dL 8.5  8.8  9.3  9.5  9.0    CALCULATED OSMOLALITY      275 - 295 mOsm/kg 296 (H)  287  292  297 (H)  294    EGFR      >=60 mL/min/1.73m2 104  103  96  96  95    AST (SGOT)      <34 U/L 8 (L)  10  12  10  9    ALT (SGPT)      10 - 49 U/L 11 (L)  <7 (L)  <7 (L)  <7 (L)  <7 (L)    ALKALINE PHOSPHATASE      45 - 117 U/L 58  65  56  63  56    Total Bilirubin      0.2 - 1.1 mg/dL 0.5  0.7  0.6  0.5  0.6    PROTEIN, TOTAL      5.7 - 8.2 g/dL 6.8  7.0  7.3  7.1  6.7    Albumin      3.2 - 4.8 g/dL 3.6  4.5  4.0  4.5  4.1    Globulin      2.0 - 3.5 g/dL 3.2  2.5  3.3  2.6  2.6    A/G Ratio      1.0 - 2.0  1.1  1.8  1.2  1.7  1.6    Patient Fasting for CMP? Yes  No  Yes   Yes  Yes    Cholesterol, Total      <200 mg/dL 131  143  148  134  135    HDL Cholesterol      40 - 59 mg/dL 57  59  58  55  54    Triglycerides      30 - 149 mg/dL 54  51  75  87  67    LDL Cholesterol Calc      <100 mg/dL 62  73  75  62  67    VLDL      0 - 30 mg/dL 8  8  12  13  10    NON-HDL CHOLESTEROL      <130 mg/dL 74  84  90  79  81    Patient Fasting for Lipid? Yes  No  Yes  Yes  Yes    MALB URINE      mg/dL   3.10      CREATININE UR RANDOM      mg/dL   293.40      MALB/CRE CALC      <=30.0 ug/mg   10.6      HEMOGLOBIN A1c      <5.7 % 5.9 (H)  6.0 (H)  6.1 (H)  6.0 (H)  5.8 (H)    ESTIMATED AVERAGE GLUCOSE      68 - 126 mg/dL 123  126  128 (H)  126  120    T4,Free (Direct)      0.8 - 1.7 ng/dL 1.0        TSH      0.358 - 3.740 mIU/mL 1.550        Vitamin B12      193 - 986 pg/mL 1,599 (H)        VITAMIN D, 25-OH, TOTAL      30.0 - 100.0 ng/mL 68.0        PSA      <=4.00 ng/mL 0.93           Legend:  (H) High  (L) Low    Current Outpatient Medications   Medication Sig Dispense Refill    METFORMIN 500 MG Oral Tab TAKE 1 TABLET BY MOUTH TWICE  DAILY WITH MEALS 180 tablet 3    Sildenafil Citrate 100 MG Oral Tab Take 1 tablet (100 mg total) by mouth daily as needed for Erectile Dysfunction. 16 tablet 0    fluocinonide 0.05 % External Cream Apply bid For 7 days 30 g 0    AMLODIPINE 10 MG Oral Tab TAKE 1 TABLET BY MOUTH DAILY 90 tablet 3    LISINOPRIL 40 MG Oral Tab TAKE 1 TABLET BY MOUTH DAILY 90 tablet 3    SIMVASTATIN 40 MG Oral Tab TAKE 1 TABLET BY MOUTH EVERY  NIGHT 90 tablet 3    ofloxacin 0.3 % Ophthalmic Solution Place 1 drop into the right eye every 4 (four) hours. 1 each 0    CONTOUR NEXT TEST In Vitro Strip USE WITH METER TO CHECK BLOOD  SUGAR TWICE DAILY 200 strip 3    OZEMPIC, 1 MG/DOSE, 4 MG/3ML Subcutaneous Solution Pen-injector INJECT SUBCUTANEOUSLY 1 MG EVERY WEEK 9 mL 3    Butalbital-Acetaminophen (BUTALBITAL-APAP)  MG Oral Tab Take 1 tablet by mouth every 4 (four) hours as needed (as  needed).      Cholecalciferol (VITAMIN D) 50 MCG (2000 UT) Oral Cap Take by mouth.      cyanocobalamin 1000 MCG Oral Tab Take 1 tablet (1,000 mcg total) by mouth daily.      HYDROcodone-acetaminophen 5-300 MG Oral Tab daily as needed.      PREVIDENT 5000 DRY MOUTH 1.1 % Dental Gel USE IT AS A REGULAR TOOTHPASTE TID FOR 2 MINUTES.  3    Acetaminophen-Codeine #3 300-30 MG Oral Tab Take 1-2 tablets by mouth every 6 (six) hours as needed for Pain. 30 tablet 0    Chlorhexidine Gluconate 0.12 % Mouth/Throat Solution as needed.        Aspirin 325 MG Oral Tab Take 1 tablet (325 mg total) by mouth daily.        Past Medical History:    Acute, but ill-defined, cerebrovascular disease    Arthropathy    Thoracic    Atypical mole    B12 deficiency    Back pain    Backache, unspecified    Background diabetic retinopathy(362.01)    Black stools    Bleeding nose    Capsulitis    Cataract    Cerebral embolism with cerebral infarction (HCC)    Corns and callosities    CVA (cerebral vascular accident) (HCC)    Depression    Depressive disorder, not elsewhere classified    Diabetes (HCC)    Diabetes mellitus (HCC)    Diplopia    Essential hypertension    Fatigue    Feeling lonely    Flatulence/gas pain/belching    Frequent urination    Headache    Headache(784.0)    Hyperlipidemia    Hypertension    Leg swelling    Loss of appetite    Muscle weakness (generalized)    Myofacial muscle pain    Night sweats    Obesity, unspecified    Pain in joint, shoulder region    Pain in joints    Proliferative diabetic retinopathy(362.02)    Radiculitis, cervical    Sexual dysfunction    Shortness of breath    Sleep disturbance    Stool incontinence    Stroke (HCC)    Syncope and collapse    Tendonitis    Tension headache    Thalamic infarction (HCC)    bilateral thalamic infarction    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, not stated as uncontrolled(250.50)    Uncomfortable fullness after meals    Unspecified  cerebral artery occlusion with cerebral infarction    Vitreous hemorrhage (HCC)    Wears glasses    Weight loss      Past Surgical History:   Procedure Laterality Date    Colonoscopy  April 2018    Laser surgery of eye  9/2010    Other surgical history      URETHRAL DILATION    Shoulder surg proc unlisted  4/1/2010    Left subdeltoid steroid injection, Left shoulder bursitis      Social History:   Social History     Socioeconomic History    Marital status:    Tobacco Use    Smoking status: Never    Smokeless tobacco: Never   Vaping Use    Vaping status: Never Used   Substance and Sexual Activity    Alcohol use: Yes     Alcohol/week: 0.0 standard drinks of alcohol     Comment: 1 or 2 glasses of beer or wine per month, social    Drug use: No   Other Topics Concern    Caffeine Concern Yes     Comment: 2-3 cups coffee/day, large    Exercise Yes     Comment: walking   pt reports he lives with his wife  On disability after CVA  2 children   Exercise: three times per week.  Diet: watches sugar closely     REVIEW OF SYSTEMS:   GENERAL: feels well otherwise  SKIN: denies any unusual skin lesions or rashes  PULMONARY: denies cough or shortness of breath  CV: denies chest pain or palpitations  GI: denies abdominal pain, heartburn, chronic diarrhea or constipation  NEURO: denies headaches or dizziness  PSYCH: denies depression or anxiety  NUTRITION:follows diabetic diet    EXAM:   There were no vitals taken for this visit.   There is no height or weight on file to calculate BMI.  GENERAL: well developed, well nourished, in no apparent distress  SKIN: no rashes,no suspicious lesions  NECK: supple,no adenopathy, no thyromegaly  LUNGS: clear to auscultation, easy breathing, no cough  CV: normal S1 S2, RRR without murmur  GI: good BS's, no masses, no HSM or tenderness, no CVA tenderness   EXT: no cyanosis, clubbing or edema, bilateral foot exam unremarkable for open skin or lesions, bilateral 2+ DP, normal visual inspection  bilaterally  NEURO: sensation is intact to monofilament bilaterally   Left sided weakness   PSYCH: judgement and insight are appropriate & intact      ASSESSMENT AND PLAN:   Malachi Gonzalez is a 64 year old male who presents for a recheck of his diabetes.   Discussed importance of medication, diet adherence, skin care and routine exercise.   Reviewed good diabetic foot care.   Annual dilated eye exams reinforced. Routine accuchecks and diabetic labwork as discussed.    1. DM type 2 with diabetic peripheral neuropathy (HCC)      2. Essential hypertension      3. History of CVA (cerebrovascular accident)      4. Elevated coronary artery calcium score      5. Snoring    - OP REFERRAL TO DIAGNOSTIC SLEEP STUDY    6. Hemiparesis affecting left side as late effect of stroke (HCC)      7. General medical exam    - Lipid Panel  - Comp Metabolic Panel (14)  - Hemoglobin A1C  - Free T4, (Free Thyroxine)  - Assay, Thyroid Stim Hormone  - CBC With Differential With Platelet  - Vitamin B12  - VITAMIN D, 25-HYDROXY [90498][Q]  - PSA, TOTAL W REFLEX TO PSA, FREE [[id number]][Q]      DISCUSSED SLEEP STUDY     The patient indicates understanding of these issues and agrees to the plan.  Follow up 3 month or sooner if needed

## (undated) NOTE — LETTER
07/10/17        [email or web address]  [address]  [facility] [address]    11/15/1960     Dear Yesika Dowd,    Our records indicate that you have outstanding lab work and or testing that was ordered for you and has not yet been completed:          Lipid

## (undated) NOTE — MR AVS SNAPSHOT
[address] [address] [phone]               Thank you for choosing us for your health care visit with Omari Pratt DO.   We are glad to serve you and happy to provide you with th [address] ([address])    [address] [facility] [address] [phone]              Allergies as of Jan 11, 2017     Pcn [Penicillins] Unknown    [facility] - MetFORMIN HCl 850 MG Tabs  - simvastatin 40 MG Tabs            Today's Orders     Lipid Panel    Complete by:  Jun 11, 2017    Assoc Dx:  Essential hypertension [I10], Hyperlipidemia, unspecified hyperlipidemia type [E78.5]           Comp Metabolic Panel

## (undated) NOTE — LETTER
01/14/22        [id number]      Dear Trey Asp records indicate that you have outstanding lab work and or testing that was ordered for you and has not yet been completed:  Orders Placed This

## (undated) NOTE — MR AVS SNAPSHOT
[address] [address] [phone]               Thank you for choosing us for your health care visit with [facility] NURSE.   We are glad to serve you and happy to provide you with th Take 1 Tab by mouth daily. cyanocobalamin 1000 MCG/ML Soln   Inject 1,000 mcg into the muscle every 30 (thirty) days. Commonly known as:  VITAMIN B12           Diclofenac Sodium 75 MG Tbec   Take 1 tablet (75 mg total) by mouth daily.    Commonl

## (undated) NOTE — MR AVS SNAPSHOT
[address] [phone]               Thank you for choosing us for your health care visit with Mynor Salcido MD.  We are glad to serve you and happy to provide you with this summary ? Please allow the office 48-72 hours to fill the prescription. ? Patient must present photo ID at time of .   If a designated family member will be picking up prescription, office must be given name of individual in advance and they must present a [phone]            Mar 13, 2017 11:40 AM   Nurse Only with  [address] [facility] [facility] ([address])    [facility]  [address] [facility] [phone] Hjw TAKE 1 TABLET BY MOUTH AT BEDTIME   Commonly known as:  ZOCOR           topiramate 100 MG Tabs   Take 4 tablets (400 mg total) by mouth nightly.    Commonly known as:  TOPIRAGEN           Vardenafil HCl 20 MG Tabs   Take 1 tablet (20 mg total) by mouth connie

## (undated) NOTE — MR AVS SNAPSHOT
[address] [address] [phone]               Thank you for choosing us for your health care visit with [facility] NURSE.   We are glad to serve you and happy to provide you with th Take 1 tablet (75 mg total) by mouth daily. Commonly known as:  VOLTAREN           lisinopril 40 MG Tabs   Take 1 tablet (40 mg total) by mouth once daily.    Commonly known as:  PRINIVIL,ZESTRIL           MetFORMIN HCl 850 MG Tabs   TAKE 1 TABLET BY MOUT

## (undated) NOTE — MR AVS SNAPSHOT
[address] [address] [phone]               Thank you for choosing us for your health care visit with [facility] NURSE.   We are glad to serve you and happy to provide you with th Commonly known as:  ZOCOR           topiramate 100 MG Tabs   Take 4 tablets (400 mg total) by mouth nightly. Commonly known as:  TOPIRAGEN           Vardenafil HCl 20 MG Tabs   Take 1 tablet (20 mg total) by mouth daily as needed.    Commonly known as:  L

## (undated) NOTE — LETTER
01/08/21        [phone]      Dear Haroldo Dears records indicate that you have outstanding lab work and or testing that was ordered for you and has not yet been completed:  Orders Placed This